# Patient Record
Sex: FEMALE | Race: BLACK OR AFRICAN AMERICAN | NOT HISPANIC OR LATINO | URBAN - METROPOLITAN AREA
[De-identification: names, ages, dates, MRNs, and addresses within clinical notes are randomized per-mention and may not be internally consistent; named-entity substitution may affect disease eponyms.]

---

## 2018-10-02 ENCOUNTER — OUTPATIENT (OUTPATIENT)
Dept: OUTPATIENT SERVICES | Facility: HOSPITAL | Age: 83
LOS: 1 days | Discharge: HOME | End: 2018-10-02

## 2018-10-02 DIAGNOSIS — R10.819 ABDOMINAL TENDERNESS, UNSPECIFIED SITE: ICD-10-CM

## 2019-04-06 ENCOUNTER — OUTPATIENT (OUTPATIENT)
Dept: OUTPATIENT SERVICES | Facility: HOSPITAL | Age: 84
LOS: 1 days | Discharge: HOME | End: 2019-04-06

## 2019-04-06 DIAGNOSIS — R79.9 ABNORMAL FINDING OF BLOOD CHEMISTRY, UNSPECIFIED: ICD-10-CM

## 2019-04-06 DIAGNOSIS — D64.9 ANEMIA, UNSPECIFIED: ICD-10-CM

## 2019-04-09 ENCOUNTER — OUTPATIENT (OUTPATIENT)
Dept: OUTPATIENT SERVICES | Facility: HOSPITAL | Age: 84
LOS: 1 days | Discharge: HOME | End: 2019-04-09

## 2019-04-09 DIAGNOSIS — R79.9 ABNORMAL FINDING OF BLOOD CHEMISTRY, UNSPECIFIED: ICD-10-CM

## 2019-04-10 ENCOUNTER — OUTPATIENT (OUTPATIENT)
Dept: OUTPATIENT SERVICES | Facility: HOSPITAL | Age: 84
LOS: 1 days | Discharge: HOME | End: 2019-04-10

## 2019-04-10 DIAGNOSIS — E10.9 TYPE 1 DIABETES MELLITUS WITHOUT COMPLICATIONS: ICD-10-CM

## 2019-04-10 DIAGNOSIS — E11.9 TYPE 2 DIABETES MELLITUS WITHOUT COMPLICATIONS: ICD-10-CM

## 2019-04-17 ENCOUNTER — OUTPATIENT (OUTPATIENT)
Dept: OUTPATIENT SERVICES | Facility: HOSPITAL | Age: 84
LOS: 1 days | Discharge: HOME | End: 2019-04-17

## 2019-04-17 DIAGNOSIS — F06.31 MOOD DISORDER DUE TO KNOWN PHYSIOLOGICAL CONDITION WITH DEPRESSIVE FEATURES: ICD-10-CM

## 2019-04-25 ENCOUNTER — OUTPATIENT (OUTPATIENT)
Dept: OUTPATIENT SERVICES | Facility: HOSPITAL | Age: 84
LOS: 1 days | Discharge: HOME | End: 2019-04-25

## 2019-04-25 DIAGNOSIS — D64.9 ANEMIA, UNSPECIFIED: ICD-10-CM

## 2019-05-06 ENCOUNTER — OUTPATIENT (OUTPATIENT)
Dept: OUTPATIENT SERVICES | Facility: HOSPITAL | Age: 84
LOS: 1 days | Discharge: HOME | End: 2019-05-06

## 2019-05-06 DIAGNOSIS — E11.9 TYPE 2 DIABETES MELLITUS WITHOUT COMPLICATIONS: ICD-10-CM

## 2019-05-08 ENCOUNTER — OUTPATIENT (OUTPATIENT)
Dept: OUTPATIENT SERVICES | Facility: HOSPITAL | Age: 84
LOS: 1 days | Discharge: HOME | End: 2019-05-08

## 2019-05-08 DIAGNOSIS — E10.9 TYPE 1 DIABETES MELLITUS WITHOUT COMPLICATIONS: ICD-10-CM

## 2019-05-08 DIAGNOSIS — I10 ESSENTIAL (PRIMARY) HYPERTENSION: ICD-10-CM

## 2019-07-10 ENCOUNTER — OUTPATIENT (OUTPATIENT)
Dept: OUTPATIENT SERVICES | Facility: HOSPITAL | Age: 84
LOS: 1 days | Discharge: HOME | End: 2019-07-10

## 2019-07-10 DIAGNOSIS — E11.9 TYPE 2 DIABETES MELLITUS WITHOUT COMPLICATIONS: ICD-10-CM

## 2019-07-17 ENCOUNTER — OUTPATIENT (OUTPATIENT)
Dept: OUTPATIENT SERVICES | Facility: HOSPITAL | Age: 84
LOS: 1 days | Discharge: HOME | End: 2019-07-17

## 2019-07-17 DIAGNOSIS — F32.9 MAJOR DEPRESSIVE DISORDER, SINGLE EPISODE, UNSPECIFIED: ICD-10-CM

## 2019-08-14 ENCOUNTER — OUTPATIENT (OUTPATIENT)
Dept: OUTPATIENT SERVICES | Facility: HOSPITAL | Age: 84
LOS: 1 days | Discharge: HOME | End: 2019-08-14

## 2019-08-14 DIAGNOSIS — E10.9 TYPE 1 DIABETES MELLITUS WITHOUT COMPLICATIONS: ICD-10-CM

## 2019-10-03 ENCOUNTER — OUTPATIENT (OUTPATIENT)
Dept: OUTPATIENT SERVICES | Facility: HOSPITAL | Age: 84
LOS: 1 days | Discharge: HOME | End: 2019-10-03

## 2019-10-03 DIAGNOSIS — R05 COUGH: ICD-10-CM

## 2019-10-03 DIAGNOSIS — J02.9 ACUTE PHARYNGITIS, UNSPECIFIED: ICD-10-CM

## 2019-10-09 ENCOUNTER — OUTPATIENT (OUTPATIENT)
Dept: OUTPATIENT SERVICES | Facility: HOSPITAL | Age: 84
LOS: 1 days | Discharge: HOME | End: 2019-10-09

## 2019-10-10 DIAGNOSIS — E10.9 TYPE 1 DIABETES MELLITUS WITHOUT COMPLICATIONS: ICD-10-CM

## 2019-10-10 DIAGNOSIS — E08.40 DIABETES MELLITUS DUE TO UNDERLYING CONDITION WITH DIABETIC NEUROPATHY, UNSPECIFIED: ICD-10-CM

## 2019-11-12 ENCOUNTER — OUTPATIENT (OUTPATIENT)
Dept: OUTPATIENT SERVICES | Facility: HOSPITAL | Age: 84
LOS: 1 days | Discharge: HOME | End: 2019-11-12

## 2019-11-15 DIAGNOSIS — R80.9 PROTEINURIA, UNSPECIFIED: ICD-10-CM

## 2023-10-25 ENCOUNTER — INPATIENT (INPATIENT)
Facility: HOSPITAL | Age: 88
LOS: 10 days | Discharge: NURSING, REHAB OR LONG-TERM CARE | DRG: 377 | End: 2023-11-05
Attending: INTERNAL MEDICINE | Admitting: STUDENT IN AN ORGANIZED HEALTH CARE EDUCATION/TRAINING PROGRAM
Payer: MEDICARE

## 2023-10-25 ENCOUNTER — TRANSCRIPTION ENCOUNTER (OUTPATIENT)
Age: 88
End: 2023-10-25

## 2023-10-25 VITALS
HEART RATE: 128 BPM | OXYGEN SATURATION: 95 % | DIASTOLIC BLOOD PRESSURE: 40 MMHG | WEIGHT: 165.35 LBS | SYSTOLIC BLOOD PRESSURE: 82 MMHG | RESPIRATION RATE: 26 BRPM | TEMPERATURE: 99 F

## 2023-10-25 DIAGNOSIS — K92.2 GASTROINTESTINAL HEMORRHAGE, UNSPECIFIED: ICD-10-CM

## 2023-10-25 LAB
ALBUMIN SERPL ELPH-MCNC: 3.4 G/DL — LOW (ref 3.5–5.2)
ALBUMIN SERPL ELPH-MCNC: 3.4 G/DL — LOW (ref 3.5–5.2)
ALP SERPL-CCNC: 52 U/L — SIGNIFICANT CHANGE UP (ref 30–115)
ALP SERPL-CCNC: 52 U/L — SIGNIFICANT CHANGE UP (ref 30–115)
ALT FLD-CCNC: <5 U/L — SIGNIFICANT CHANGE UP (ref 0–41)
ALT FLD-CCNC: <5 U/L — SIGNIFICANT CHANGE UP (ref 0–41)
ANION GAP SERPL CALC-SCNC: 14 MMOL/L — SIGNIFICANT CHANGE UP (ref 7–14)
ANION GAP SERPL CALC-SCNC: 14 MMOL/L — SIGNIFICANT CHANGE UP (ref 7–14)
APPEARANCE UR: ABNORMAL
APPEARANCE UR: ABNORMAL
APTT BLD: 25.9 SEC — LOW (ref 27–39.2)
APTT BLD: 25.9 SEC — LOW (ref 27–39.2)
AST SERPL-CCNC: 13 U/L — SIGNIFICANT CHANGE UP (ref 0–41)
AST SERPL-CCNC: 13 U/L — SIGNIFICANT CHANGE UP (ref 0–41)
BASE EXCESS BLDA CALC-SCNC: -5.1 MMOL/L — LOW (ref -2–3)
BASE EXCESS BLDA CALC-SCNC: -5.1 MMOL/L — LOW (ref -2–3)
BASOPHILS # BLD AUTO: 0.04 K/UL — SIGNIFICANT CHANGE UP (ref 0–0.2)
BASOPHILS # BLD AUTO: 0.04 K/UL — SIGNIFICANT CHANGE UP (ref 0–0.2)
BASOPHILS NFR BLD AUTO: 0.3 % — SIGNIFICANT CHANGE UP (ref 0–1)
BASOPHILS NFR BLD AUTO: 0.3 % — SIGNIFICANT CHANGE UP (ref 0–1)
BILIRUB SERPL-MCNC: 0.3 MG/DL — SIGNIFICANT CHANGE UP (ref 0.2–1.2)
BILIRUB SERPL-MCNC: 0.3 MG/DL — SIGNIFICANT CHANGE UP (ref 0.2–1.2)
BILIRUB UR-MCNC: NEGATIVE — SIGNIFICANT CHANGE UP
BILIRUB UR-MCNC: NEGATIVE — SIGNIFICANT CHANGE UP
BLD GP AB SCN SERPL QL: SIGNIFICANT CHANGE UP
BUN SERPL-MCNC: 41 MG/DL — HIGH (ref 10–20)
BUN SERPL-MCNC: 41 MG/DL — HIGH (ref 10–20)
CALCIUM SERPL-MCNC: 8.7 MG/DL — SIGNIFICANT CHANGE UP (ref 8.4–10.5)
CALCIUM SERPL-MCNC: 8.7 MG/DL — SIGNIFICANT CHANGE UP (ref 8.4–10.5)
CHLORIDE SERPL-SCNC: 109 MMOL/L — SIGNIFICANT CHANGE UP (ref 98–110)
CHLORIDE SERPL-SCNC: 109 MMOL/L — SIGNIFICANT CHANGE UP (ref 98–110)
CO2 SERPL-SCNC: 22 MMOL/L — SIGNIFICANT CHANGE UP (ref 17–32)
CO2 SERPL-SCNC: 22 MMOL/L — SIGNIFICANT CHANGE UP (ref 17–32)
COLOR SPEC: YELLOW — SIGNIFICANT CHANGE UP
COLOR SPEC: YELLOW — SIGNIFICANT CHANGE UP
CREAT SERPL-MCNC: 0.9 MG/DL — SIGNIFICANT CHANGE UP (ref 0.7–1.5)
CREAT SERPL-MCNC: 0.9 MG/DL — SIGNIFICANT CHANGE UP (ref 0.7–1.5)
DIFF PNL FLD: NEGATIVE — SIGNIFICANT CHANGE UP
DIFF PNL FLD: NEGATIVE — SIGNIFICANT CHANGE UP
EGFR: 62 ML/MIN/1.73M2 — SIGNIFICANT CHANGE UP
EGFR: 62 ML/MIN/1.73M2 — SIGNIFICANT CHANGE UP
EOSINOPHIL # BLD AUTO: 0.01 K/UL — SIGNIFICANT CHANGE UP (ref 0–0.7)
EOSINOPHIL # BLD AUTO: 0.01 K/UL — SIGNIFICANT CHANGE UP (ref 0–0.7)
EOSINOPHIL NFR BLD AUTO: 0.1 % — SIGNIFICANT CHANGE UP (ref 0–8)
EOSINOPHIL NFR BLD AUTO: 0.1 % — SIGNIFICANT CHANGE UP (ref 0–8)
GAS PNL BLDA: SIGNIFICANT CHANGE UP
GAS PNL BLDA: SIGNIFICANT CHANGE UP
GLUCOSE BLDC GLUCOMTR-MCNC: 223 MG/DL — HIGH (ref 70–99)
GLUCOSE BLDC GLUCOMTR-MCNC: 223 MG/DL — HIGH (ref 70–99)
GLUCOSE BLDC GLUCOMTR-MCNC: 248 MG/DL — HIGH (ref 70–99)
GLUCOSE BLDC GLUCOMTR-MCNC: 248 MG/DL — HIGH (ref 70–99)
GLUCOSE SERPL-MCNC: 235 MG/DL — HIGH (ref 70–99)
GLUCOSE SERPL-MCNC: 235 MG/DL — HIGH (ref 70–99)
GLUCOSE UR QL: 100 MG/DL
GLUCOSE UR QL: 100 MG/DL
HCO3 BLDA-SCNC: 21 MMOL/L — SIGNIFICANT CHANGE UP (ref 21–28)
HCO3 BLDA-SCNC: 21 MMOL/L — SIGNIFICANT CHANGE UP (ref 21–28)
HCT VFR BLD CALC: 36 % — LOW (ref 37–47)
HCT VFR BLD CALC: 36 % — LOW (ref 37–47)
HCT VFR BLD CALC: 39.7 % — SIGNIFICANT CHANGE UP (ref 37–47)
HCT VFR BLD CALC: 39.7 % — SIGNIFICANT CHANGE UP (ref 37–47)
HGB BLD-MCNC: 11.1 G/DL — LOW (ref 12–16)
HGB BLD-MCNC: 11.1 G/DL — LOW (ref 12–16)
HGB BLD-MCNC: 12.1 G/DL — SIGNIFICANT CHANGE UP (ref 12–16)
HGB BLD-MCNC: 12.1 G/DL — SIGNIFICANT CHANGE UP (ref 12–16)
HOROWITZ INDEX BLDA+IHG-RTO: 50 — SIGNIFICANT CHANGE UP
HOROWITZ INDEX BLDA+IHG-RTO: 50 — SIGNIFICANT CHANGE UP
IMM GRANULOCYTES NFR BLD AUTO: 0.3 % — SIGNIFICANT CHANGE UP (ref 0.1–0.3)
IMM GRANULOCYTES NFR BLD AUTO: 0.3 % — SIGNIFICANT CHANGE UP (ref 0.1–0.3)
INR BLD: 1.2 RATIO — SIGNIFICANT CHANGE UP (ref 0.65–1.3)
INR BLD: 1.2 RATIO — SIGNIFICANT CHANGE UP (ref 0.65–1.3)
KETONES UR-MCNC: NEGATIVE MG/DL — SIGNIFICANT CHANGE UP
KETONES UR-MCNC: NEGATIVE MG/DL — SIGNIFICANT CHANGE UP
LEUKOCYTE ESTERASE UR-ACNC: ABNORMAL
LEUKOCYTE ESTERASE UR-ACNC: ABNORMAL
LYMPHOCYTES # BLD AUTO: 1.47 K/UL — SIGNIFICANT CHANGE UP (ref 1.2–3.4)
LYMPHOCYTES # BLD AUTO: 1.47 K/UL — SIGNIFICANT CHANGE UP (ref 1.2–3.4)
LYMPHOCYTES # BLD AUTO: 10.2 % — LOW (ref 20.5–51.1)
LYMPHOCYTES # BLD AUTO: 10.2 % — LOW (ref 20.5–51.1)
MCHC RBC-ENTMCNC: 27.6 PG — SIGNIFICANT CHANGE UP (ref 27–31)
MCHC RBC-ENTMCNC: 27.6 PG — SIGNIFICANT CHANGE UP (ref 27–31)
MCHC RBC-ENTMCNC: 28.1 PG — SIGNIFICANT CHANGE UP (ref 27–31)
MCHC RBC-ENTMCNC: 28.1 PG — SIGNIFICANT CHANGE UP (ref 27–31)
MCHC RBC-ENTMCNC: 30.5 G/DL — LOW (ref 32–37)
MCHC RBC-ENTMCNC: 30.5 G/DL — LOW (ref 32–37)
MCHC RBC-ENTMCNC: 30.8 G/DL — LOW (ref 32–37)
MCHC RBC-ENTMCNC: 30.8 G/DL — LOW (ref 32–37)
MCV RBC AUTO: 90.4 FL — SIGNIFICANT CHANGE UP (ref 81–99)
MCV RBC AUTO: 90.4 FL — SIGNIFICANT CHANGE UP (ref 81–99)
MCV RBC AUTO: 91.1 FL — SIGNIFICANT CHANGE UP (ref 81–99)
MCV RBC AUTO: 91.1 FL — SIGNIFICANT CHANGE UP (ref 81–99)
MONOCYTES # BLD AUTO: 0.91 K/UL — HIGH (ref 0.1–0.6)
MONOCYTES # BLD AUTO: 0.91 K/UL — HIGH (ref 0.1–0.6)
MONOCYTES NFR BLD AUTO: 6.3 % — SIGNIFICANT CHANGE UP (ref 1.7–9.3)
MONOCYTES NFR BLD AUTO: 6.3 % — SIGNIFICANT CHANGE UP (ref 1.7–9.3)
NEUTROPHILS # BLD AUTO: 11.96 K/UL — HIGH (ref 1.4–6.5)
NEUTROPHILS # BLD AUTO: 11.96 K/UL — HIGH (ref 1.4–6.5)
NEUTROPHILS NFR BLD AUTO: 82.8 % — HIGH (ref 42.2–75.2)
NEUTROPHILS NFR BLD AUTO: 82.8 % — HIGH (ref 42.2–75.2)
NITRITE UR-MCNC: NEGATIVE — SIGNIFICANT CHANGE UP
NITRITE UR-MCNC: NEGATIVE — SIGNIFICANT CHANGE UP
NRBC # BLD: 0 /100 WBCS — SIGNIFICANT CHANGE UP (ref 0–0)
NT-PROBNP SERPL-SCNC: 334 PG/ML — HIGH (ref 0–300)
NT-PROBNP SERPL-SCNC: 334 PG/ML — HIGH (ref 0–300)
PCO2 BLDA: 43 MMHG — SIGNIFICANT CHANGE UP (ref 25–48)
PCO2 BLDA: 43 MMHG — SIGNIFICANT CHANGE UP (ref 25–48)
PH BLDA: 7.3 — LOW (ref 7.35–7.45)
PH BLDA: 7.3 — LOW (ref 7.35–7.45)
PH UR: 6 — SIGNIFICANT CHANGE UP (ref 5–8)
PH UR: 6 — SIGNIFICANT CHANGE UP (ref 5–8)
PLATELET # BLD AUTO: 253 K/UL — SIGNIFICANT CHANGE UP (ref 130–400)
PLATELET # BLD AUTO: 253 K/UL — SIGNIFICANT CHANGE UP (ref 130–400)
PLATELET # BLD AUTO: 273 K/UL — SIGNIFICANT CHANGE UP (ref 130–400)
PLATELET # BLD AUTO: 273 K/UL — SIGNIFICANT CHANGE UP (ref 130–400)
PMV BLD: 11.7 FL — HIGH (ref 7.4–10.4)
PMV BLD: 11.7 FL — HIGH (ref 7.4–10.4)
PMV BLD: 12.1 FL — HIGH (ref 7.4–10.4)
PMV BLD: 12.1 FL — HIGH (ref 7.4–10.4)
PO2 BLDA: 397 MMHG — HIGH (ref 83–108)
PO2 BLDA: 397 MMHG — HIGH (ref 83–108)
POTASSIUM SERPL-MCNC: 4.6 MMOL/L — SIGNIFICANT CHANGE UP (ref 3.5–5)
POTASSIUM SERPL-MCNC: 4.6 MMOL/L — SIGNIFICANT CHANGE UP (ref 3.5–5)
POTASSIUM SERPL-SCNC: 4.6 MMOL/L — SIGNIFICANT CHANGE UP (ref 3.5–5)
POTASSIUM SERPL-SCNC: 4.6 MMOL/L — SIGNIFICANT CHANGE UP (ref 3.5–5)
PROT SERPL-MCNC: 6.1 G/DL — SIGNIFICANT CHANGE UP (ref 6–8)
PROT SERPL-MCNC: 6.1 G/DL — SIGNIFICANT CHANGE UP (ref 6–8)
PROT UR-MCNC: 30 MG/DL
PROT UR-MCNC: 30 MG/DL
PROTHROM AB SERPL-ACNC: 13.8 SEC — HIGH (ref 9.95–12.87)
PROTHROM AB SERPL-ACNC: 13.8 SEC — HIGH (ref 9.95–12.87)
RBC # BLD: 3.95 M/UL — LOW (ref 4.2–5.4)
RBC # BLD: 3.95 M/UL — LOW (ref 4.2–5.4)
RBC # BLD: 4.39 M/UL — SIGNIFICANT CHANGE UP (ref 4.2–5.4)
RBC # BLD: 4.39 M/UL — SIGNIFICANT CHANGE UP (ref 4.2–5.4)
RBC # FLD: 15 % — HIGH (ref 11.5–14.5)
RBC # FLD: 15 % — HIGH (ref 11.5–14.5)
RBC # FLD: 15.1 % — HIGH (ref 11.5–14.5)
RBC # FLD: 15.1 % — HIGH (ref 11.5–14.5)
SAO2 % BLDA: 100 % — HIGH (ref 94–98)
SAO2 % BLDA: 100 % — HIGH (ref 94–98)
SODIUM SERPL-SCNC: 145 MMOL/L — SIGNIFICANT CHANGE UP (ref 135–146)
SODIUM SERPL-SCNC: 145 MMOL/L — SIGNIFICANT CHANGE UP (ref 135–146)
SP GR SPEC: 1.02 — SIGNIFICANT CHANGE UP (ref 1–1.03)
SP GR SPEC: 1.02 — SIGNIFICANT CHANGE UP (ref 1–1.03)
TROPONIN T SERPL-MCNC: <0.01 NG/ML — SIGNIFICANT CHANGE UP
TROPONIN T SERPL-MCNC: <0.01 NG/ML — SIGNIFICANT CHANGE UP
UROBILINOGEN FLD QL: 1 MG/DL — SIGNIFICANT CHANGE UP (ref 0.2–1)
UROBILINOGEN FLD QL: 1 MG/DL — SIGNIFICANT CHANGE UP (ref 0.2–1)
WBC # BLD: 14.44 K/UL — HIGH (ref 4.8–10.8)
WBC # BLD: 14.44 K/UL — HIGH (ref 4.8–10.8)
WBC # BLD: 15.67 K/UL — HIGH (ref 4.8–10.8)
WBC # BLD: 15.67 K/UL — HIGH (ref 4.8–10.8)
WBC # FLD AUTO: 14.44 K/UL — HIGH (ref 4.8–10.8)
WBC # FLD AUTO: 14.44 K/UL — HIGH (ref 4.8–10.8)
WBC # FLD AUTO: 15.67 K/UL — HIGH (ref 4.8–10.8)
WBC # FLD AUTO: 15.67 K/UL — HIGH (ref 4.8–10.8)

## 2023-10-25 PROCEDURE — 87641 MR-STAPH DNA AMP PROBE: CPT

## 2023-10-25 PROCEDURE — 85027 COMPLETE CBC AUTOMATED: CPT

## 2023-10-25 PROCEDURE — 85025 COMPLETE CBC W/AUTO DIFF WBC: CPT

## 2023-10-25 PROCEDURE — 87086 URINE CULTURE/COLONY COUNT: CPT

## 2023-10-25 PROCEDURE — C1889: CPT

## 2023-10-25 PROCEDURE — 85045 AUTOMATED RETICULOCYTE COUNT: CPT

## 2023-10-25 PROCEDURE — 81001 URINALYSIS AUTO W/SCOPE: CPT

## 2023-10-25 PROCEDURE — 83735 ASSAY OF MAGNESIUM: CPT

## 2023-10-25 PROCEDURE — 99223 1ST HOSP IP/OBS HIGH 75: CPT | Mod: 25

## 2023-10-25 PROCEDURE — 71045 X-RAY EXAM CHEST 1 VIEW: CPT | Mod: 26,76

## 2023-10-25 PROCEDURE — 82330 ASSAY OF CALCIUM: CPT

## 2023-10-25 PROCEDURE — 80048 BASIC METABOLIC PNL TOTAL CA: CPT

## 2023-10-25 PROCEDURE — 43255 EGD CONTROL BLEEDING ANY: CPT

## 2023-10-25 PROCEDURE — 36556 INSERT NON-TUNNEL CV CATH: CPT | Mod: LT

## 2023-10-25 PROCEDURE — 86923 COMPATIBILITY TEST ELECTRIC: CPT

## 2023-10-25 PROCEDURE — 92610 EVALUATE SWALLOWING FUNCTION: CPT | Mod: GN

## 2023-10-25 PROCEDURE — 84484 ASSAY OF TROPONIN QUANT: CPT

## 2023-10-25 PROCEDURE — 86900 BLOOD TYPING SEROLOGIC ABO: CPT

## 2023-10-25 PROCEDURE — 85014 HEMATOCRIT: CPT

## 2023-10-25 PROCEDURE — 83605 ASSAY OF LACTIC ACID: CPT

## 2023-10-25 PROCEDURE — 82962 GLUCOSE BLOOD TEST: CPT

## 2023-10-25 PROCEDURE — 93971 EXTREMITY STUDY: CPT | Mod: RT

## 2023-10-25 PROCEDURE — P9016: CPT

## 2023-10-25 PROCEDURE — 85730 THROMBOPLASTIN TIME PARTIAL: CPT

## 2023-10-25 PROCEDURE — 94003 VENT MGMT INPAT SUBQ DAY: CPT

## 2023-10-25 PROCEDURE — 36430 TRANSFUSION BLD/BLD COMPNT: CPT

## 2023-10-25 PROCEDURE — 97162 PT EVAL MOD COMPLEX 30 MIN: CPT | Mod: GP

## 2023-10-25 PROCEDURE — 94660 CPAP INITIATION&MGMT: CPT

## 2023-10-25 PROCEDURE — 87077 CULTURE AEROBIC IDENTIFY: CPT

## 2023-10-25 PROCEDURE — 99291 CRITICAL CARE FIRST HOUR: CPT | Mod: 25

## 2023-10-25 PROCEDURE — 85610 PROTHROMBIN TIME: CPT

## 2023-10-25 PROCEDURE — 84132 ASSAY OF SERUM POTASSIUM: CPT

## 2023-10-25 PROCEDURE — 31500 INSERT EMERGENCY AIRWAY: CPT

## 2023-10-25 PROCEDURE — 93005 ELECTROCARDIOGRAM TRACING: CPT

## 2023-10-25 PROCEDURE — 87040 BLOOD CULTURE FOR BACTERIA: CPT

## 2023-10-25 PROCEDURE — 93306 TTE W/DOPPLER COMPLETE: CPT

## 2023-10-25 PROCEDURE — 71045 X-RAY EXAM CHEST 1 VIEW: CPT

## 2023-10-25 PROCEDURE — 86850 RBC ANTIBODY SCREEN: CPT

## 2023-10-25 PROCEDURE — 85018 HEMOGLOBIN: CPT

## 2023-10-25 PROCEDURE — 80053 COMPREHEN METABOLIC PANEL: CPT

## 2023-10-25 PROCEDURE — 84295 ASSAY OF SERUM SODIUM: CPT

## 2023-10-25 PROCEDURE — 84100 ASSAY OF PHOSPHORUS: CPT

## 2023-10-25 PROCEDURE — 82803 BLOOD GASES ANY COMBINATION: CPT

## 2023-10-25 PROCEDURE — 87640 STAPH A DNA AMP PROBE: CPT

## 2023-10-25 PROCEDURE — 86901 BLOOD TYPING SEROLOGIC RH(D): CPT

## 2023-10-25 PROCEDURE — 93970 EXTREMITY STUDY: CPT

## 2023-10-25 PROCEDURE — 94002 VENT MGMT INPAT INIT DAY: CPT

## 2023-10-25 PROCEDURE — 36415 COLL VENOUS BLD VENIPUNCTURE: CPT

## 2023-10-25 PROCEDURE — 74018 RADEX ABDOMEN 1 VIEW: CPT

## 2023-10-25 PROCEDURE — C9113: CPT

## 2023-10-25 PROCEDURE — 92526 ORAL FUNCTION THERAPY: CPT | Mod: GN

## 2023-10-25 PROCEDURE — 99291 CRITICAL CARE FIRST HOUR: CPT | Mod: GC

## 2023-10-25 PROCEDURE — 83036 HEMOGLOBIN GLYCOSYLATED A1C: CPT

## 2023-10-25 PROCEDURE — 87186 SC STD MICRODIL/AGAR DIL: CPT

## 2023-10-25 RX ORDER — DEXTROSE 50 % IN WATER 50 %
15 SYRINGE (ML) INTRAVENOUS ONCE
Refills: 0 | Status: DISCONTINUED | OUTPATIENT
Start: 2023-10-25 | End: 2023-10-30

## 2023-10-25 RX ORDER — CEFTRIAXONE 500 MG/1
2000 INJECTION, POWDER, FOR SOLUTION INTRAMUSCULAR; INTRAVENOUS ONCE
Refills: 0 | Status: COMPLETED | OUTPATIENT
Start: 2023-10-25 | End: 2023-10-25

## 2023-10-25 RX ORDER — FENTANYL CITRATE 50 UG/ML
0.5 INJECTION INTRAVENOUS
Qty: 2500 | Refills: 0 | Status: DISCONTINUED | OUTPATIENT
Start: 2023-10-25 | End: 2023-10-30

## 2023-10-25 RX ORDER — ETOMIDATE 2 MG/ML
20 INJECTION INTRAVENOUS ONCE
Refills: 0 | Status: COMPLETED | OUTPATIENT
Start: 2023-10-25 | End: 2023-10-25

## 2023-10-25 RX ORDER — METOCLOPRAMIDE HCL 10 MG
10 TABLET ORAL ONCE
Refills: 0 | Status: COMPLETED | OUTPATIENT
Start: 2023-10-25 | End: 2023-10-25

## 2023-10-25 RX ORDER — SODIUM CHLORIDE 9 MG/ML
1000 INJECTION, SOLUTION INTRAVENOUS
Refills: 0 | Status: DISCONTINUED | OUTPATIENT
Start: 2023-10-25 | End: 2023-10-26

## 2023-10-25 RX ORDER — DEXTROSE 50 % IN WATER 50 %
25 SYRINGE (ML) INTRAVENOUS ONCE
Refills: 0 | Status: DISCONTINUED | OUTPATIENT
Start: 2023-10-25 | End: 2023-10-30

## 2023-10-25 RX ORDER — PANTOPRAZOLE SODIUM 20 MG/1
80 TABLET, DELAYED RELEASE ORAL ONCE
Refills: 0 | Status: COMPLETED | OUTPATIENT
Start: 2023-10-25 | End: 2023-10-25

## 2023-10-25 RX ORDER — FENTANYL CITRATE 50 UG/ML
50 INJECTION INTRAVENOUS ONCE
Refills: 0 | Status: DISCONTINUED | OUTPATIENT
Start: 2023-10-25 | End: 2023-10-25

## 2023-10-25 RX ORDER — SODIUM CHLORIDE 9 MG/ML
1000 INJECTION, SOLUTION INTRAVENOUS ONCE
Refills: 0 | Status: COMPLETED | OUTPATIENT
Start: 2023-10-25 | End: 2023-10-25

## 2023-10-25 RX ORDER — ROCURONIUM BROMIDE 10 MG/ML
10 VIAL (ML) INTRAVENOUS ONCE
Refills: 0 | Status: COMPLETED | OUTPATIENT
Start: 2023-10-25 | End: 2023-10-25

## 2023-10-25 RX ORDER — PIPERACILLIN AND TAZOBACTAM 4; .5 G/20ML; G/20ML
3.38 INJECTION, POWDER, LYOPHILIZED, FOR SOLUTION INTRAVENOUS EVERY 8 HOURS
Refills: 0 | Status: DISCONTINUED | OUTPATIENT
Start: 2023-10-25 | End: 2023-10-30

## 2023-10-25 RX ORDER — NOREPINEPHRINE BITARTRATE/D5W 8 MG/250ML
0.05 PLASTIC BAG, INJECTION (ML) INTRAVENOUS
Qty: 16 | Refills: 0 | Status: DISCONTINUED | OUTPATIENT
Start: 2023-10-25 | End: 2023-10-30

## 2023-10-25 RX ORDER — PROPOFOL 10 MG/ML
5 INJECTION, EMULSION INTRAVENOUS
Qty: 500 | Refills: 0 | Status: DISCONTINUED | OUTPATIENT
Start: 2023-10-25 | End: 2023-10-25

## 2023-10-25 RX ORDER — SENNA PLUS 8.6 MG/1
2 TABLET ORAL AT BEDTIME
Refills: 0 | Status: DISCONTINUED | OUTPATIENT
Start: 2023-10-25 | End: 2023-11-05

## 2023-10-25 RX ORDER — TRANEXAMIC ACID 100 MG/ML
1000 INJECTION, SOLUTION INTRAVENOUS ONCE
Refills: 0 | Status: COMPLETED | OUTPATIENT
Start: 2023-10-25 | End: 2023-10-25

## 2023-10-25 RX ORDER — SODIUM CHLORIDE 9 MG/ML
1000 INJECTION, SOLUTION INTRAVENOUS
Refills: 0 | Status: DISCONTINUED | OUTPATIENT
Start: 2023-10-25 | End: 2023-10-30

## 2023-10-25 RX ORDER — DEXTROSE 50 % IN WATER 50 %
12.5 SYRINGE (ML) INTRAVENOUS ONCE
Refills: 0 | Status: DISCONTINUED | OUTPATIENT
Start: 2023-10-25 | End: 2023-10-30

## 2023-10-25 RX ORDER — INSULIN LISPRO 100/ML
VIAL (ML) SUBCUTANEOUS
Refills: 0 | Status: DISCONTINUED | OUTPATIENT
Start: 2023-10-25 | End: 2023-11-05

## 2023-10-25 RX ORDER — PANTOPRAZOLE SODIUM 20 MG/1
8 TABLET, DELAYED RELEASE ORAL
Qty: 80 | Refills: 0 | Status: DISCONTINUED | OUTPATIENT
Start: 2023-10-25 | End: 2023-10-26

## 2023-10-25 RX ORDER — PROPOFOL 10 MG/ML
5 INJECTION, EMULSION INTRAVENOUS
Qty: 1000 | Refills: 0 | Status: DISCONTINUED | OUTPATIENT
Start: 2023-10-25 | End: 2023-10-26

## 2023-10-25 RX ORDER — PROPOFOL 10 MG/ML
50 INJECTION, EMULSION INTRAVENOUS ONCE
Refills: 0 | Status: COMPLETED | OUTPATIENT
Start: 2023-10-25 | End: 2023-10-25

## 2023-10-25 RX ORDER — GLUCAGON INJECTION, SOLUTION 0.5 MG/.1ML
1 INJECTION, SOLUTION SUBCUTANEOUS ONCE
Refills: 0 | Status: DISCONTINUED | OUTPATIENT
Start: 2023-10-25 | End: 2023-10-31

## 2023-10-25 RX ORDER — ERYTHROMYCIN ETHYLSUCCINATE 400 MG
250 TABLET ORAL ONCE
Refills: 0 | Status: DISCONTINUED | OUTPATIENT
Start: 2023-10-25 | End: 2023-10-25

## 2023-10-25 RX ORDER — ERYTHROMYCIN ETHYLSUCCINATE 400 MG
250 TABLET ORAL ONCE
Refills: 0 | Status: COMPLETED | OUTPATIENT
Start: 2023-10-25 | End: 2023-10-25

## 2023-10-25 RX ORDER — SODIUM CHLORIDE 9 MG/ML
1000 INJECTION INTRAMUSCULAR; INTRAVENOUS; SUBCUTANEOUS ONCE
Refills: 0 | Status: COMPLETED | OUTPATIENT
Start: 2023-10-25 | End: 2023-10-25

## 2023-10-25 RX ADMIN — TRANEXAMIC ACID 220 MILLIGRAM(S): 100 INJECTION, SOLUTION INTRAVENOUS at 13:23

## 2023-10-25 RX ADMIN — ETOMIDATE 20 MILLIGRAM(S): 2 INJECTION INTRAVENOUS at 12:30

## 2023-10-25 RX ADMIN — FENTANYL CITRATE 50 MICROGRAM(S): 50 INJECTION INTRAVENOUS at 16:54

## 2023-10-25 RX ADMIN — SODIUM CHLORIDE 1000 MILLILITER(S): 9 INJECTION INTRAMUSCULAR; INTRAVENOUS; SUBCUTANEOUS at 11:15

## 2023-10-25 RX ADMIN — PIPERACILLIN AND TAZOBACTAM 25 GRAM(S): 4; .5 INJECTION, POWDER, LYOPHILIZED, FOR SOLUTION INTRAVENOUS at 21:17

## 2023-10-25 RX ADMIN — PROPOFOL 2.25 MICROGRAM(S)/KG/MIN: 10 INJECTION, EMULSION INTRAVENOUS at 16:37

## 2023-10-25 RX ADMIN — PANTOPRAZOLE SODIUM 80 MILLIGRAM(S): 20 TABLET, DELAYED RELEASE ORAL at 13:18

## 2023-10-25 RX ADMIN — CEFTRIAXONE 100 MILLIGRAM(S): 500 INJECTION, POWDER, FOR SOLUTION INTRAMUSCULAR; INTRAVENOUS at 13:12

## 2023-10-25 RX ADMIN — Medication 10 MILLIGRAM(S): at 13:22

## 2023-10-25 RX ADMIN — PANTOPRAZOLE SODIUM 10 MG/HR: 20 TABLET, DELAYED RELEASE ORAL at 21:18

## 2023-10-25 RX ADMIN — PROPOFOL 2.13 MICROGRAM(S)/KG/MIN: 10 INJECTION, EMULSION INTRAVENOUS at 21:18

## 2023-10-25 RX ADMIN — FENTANYL CITRATE 50 MICROGRAM(S): 50 INJECTION INTRAVENOUS at 15:00

## 2023-10-25 RX ADMIN — SODIUM CHLORIDE 1000 MILLILITER(S): 9 INJECTION, SOLUTION INTRAVENOUS at 14:00

## 2023-10-25 RX ADMIN — Medication 250 MILLIGRAM(S): at 18:29

## 2023-10-25 RX ADMIN — Medication 10 MILLIGRAM(S): at 18:28

## 2023-10-25 RX ADMIN — PANTOPRAZOLE SODIUM 10 MG/HR: 20 TABLET, DELAYED RELEASE ORAL at 13:19

## 2023-10-25 RX ADMIN — Medication 2: at 21:49

## 2023-10-25 RX ADMIN — FENTANYL CITRATE 3.75 MICROGRAM(S)/KG/HR: 50 INJECTION INTRAVENOUS at 16:25

## 2023-10-25 RX ADMIN — Medication 3.52 MICROGRAM(S)/KG/MIN: at 21:18

## 2023-10-25 RX ADMIN — PROPOFOL 50 MILLIGRAM(S): 10 INJECTION, EMULSION INTRAVENOUS at 15:10

## 2023-10-25 NOTE — CONSULT NOTE ADULT - ASSESSMENT
Pt is a 87yo F with PMHx including Afib on Xarelto, CVA, HTN, DM a/w GIB s/p EGD; ENT consulted for epistaxis.     H/H: 11.1/36.0    ·	On exam, pt with ETT in place, vented. +dried blood noted to L naris. Oral exam limited 2/2 ETT however no BRB noted with suctioning of ETT  ·	Will hold off on packing placement for now as no signs of active epistaxis   ·	Trend h/h; transfuse prn   ·	s/w covering resident   ·	will d/w attng

## 2023-10-25 NOTE — CONSULT NOTE ADULT - SUBJECTIVE AND OBJECTIVE BOX
Patient is a 88y old  Female who presents with a chief complaint of     HPI:  88-year-old female with a past medical history significant for diabetes CVA atrial fibrillation hypertension on Xarelto who presents due to concern for upper GI bleed.  As per nursing home patient was having an episode of hematemesis patient in route was hemodynamically stable however when presented to the ED patient was noted to be hypotensive and tachycardic.  Patient had a witnessed episode of bright red blood vomiting.  At that point patient became more lethargic.  Patient's daughter at bedside had an extensive conversation with patient current CODE STATUS.  Patient and daughter both would like to be kept full code.  At that point made a decision with family aware to intubate patient due to concern for airway protection.  GI at bedside.  Patient given TXA ceftriaxone PPI bolus PPI drip.  Patient intubated and blood pressure improved with fluids.      PAST MEDICAL & SURGICAL HISTORY:      SOCIAL HX:   Smoking     UTO                    ETOH                            Other    FAMILY HISTORY:  :  No known cardiovacular family hisotry     Review Of Systems:     All ROS are negative except per HPI       Allergies    No Known Allergies    Intolerances          PHYSICAL EXAM    ICU Vital Signs Last 24 Hrs  T(C): 37.6 (25 Oct 2023 13:23), Max: 37.6 (25 Oct 2023 13:23)  T(F): 99.6 (25 Oct 2023 13:23), Max: 99.6 (25 Oct 2023 13:23)  HR: 140 (25 Oct 2023 13:23) (101 - 140)  BP: 159/93 (25 Oct 2023 13:23) (82/40 - 159/93)  BP(mean): --  ABP: --  ABP(mean): --  RR: 26 (25 Oct 2023 11:00) (26 - 26)  SpO2: 98% (25 Oct 2023 12:24) (95% - 98%)    O2 Parameters below as of 25 Oct 2023 11:00  Patient On (Oxygen Delivery Method): room air            CONSTITUTIONAL:  in NAD    ENT:   Airway patent,   Mouth with normal mucosa.     CARDIAC:   Tachycard  No edema    RESPIRATORY:   decreased bilateral BS   Not tachypneic,  No use of accessory muscles    GASTROINTESTINAL:  Abdomen soft,   tender to palpation,   No guarding,   + BS    NEUROLOGICAL:   sedated     SKIN:   Skin normal color for race,   No evidence of rash.          LABS:                          12.1   14.44 )-----------( 273      ( 25 Oct 2023 11:13 )             39.7                                               10-25    145  |  109  |  41<H>  ----------------------------<  235<H>  4.6   |  22  |  0.9    Ca    8.7      25 Oct 2023 11:13    TPro  6.1  /  Alb  3.4<L>  /  TBili  0.3  /  DBili  x   /  AST  13  /  ALT  <5  /  AlkPhos  52  10-25      PT/INR - ( 25 Oct 2023 11:13 )   PT: 13.80 sec;   INR: 1.20 ratio         PTT - ( 25 Oct 2023 11:13 )  PTT:25.9 sec                                       Urinalysis Basic - ( 25 Oct 2023 11:13 )    Color: x / Appearance: x / SG: x / pH: x  Gluc: 235 mg/dL / Ketone: x  / Bili: x / Urobili: x   Blood: x / Protein: x / Nitrite: x   Leuk Esterase: x / RBC: x / WBC x   Sq Epi: x / Non Sq Epi: x / Bacteria: x        CARDIAC MARKERS ( 25 Oct 2023 12:52 )  x     / <0.01 ng/mL / x     / x     / x                                                LIVER FUNCTIONS - ( 25 Oct 2023 11:13 )  Alb: 3.4 g/dL / Pro: 6.1 g/dL / ALK PHOS: 52 U/L / ALT: <5 U/L / AST: 13 U/L / GGT: x                                                                                               Mode: AC/ CMV (Assist Control/ Continuous Mandatory Ventilation)  RR (machine): 12  TV (machine): 400  FiO2: 100  PEEP: 8  MAP: 10  PIP: 24                                          X-Rays reviewed                                                                                     ECHO    MEDICATIONS  (STANDING):  cefTRIAXone   IVPB 2000 milliGRAM(s) IV Intermittent once  fentaNYL   Infusion. 0.5 MICROgram(s)/kG/Hr (3.75 mL/Hr) IV Continuous <Continuous>  pantoprazole Infusion 8 mG/Hr (10 mL/Hr) IV Continuous <Continuous>  propofol Infusion 5 MICROgram(s)/kG/Min (2.25 mL/Hr) IV Continuous <Continuous>    MEDICATIONS  (PRN):

## 2023-10-25 NOTE — ED PROVIDER NOTE - OBJECTIVE STATEMENT
88 year old female with pmhx of afib on xarelto, cva, htn, and dm brought in for vomiting bright red blood. pt sent in from NH after episode of epistaxis this morning, followed up vomiting red blood. pt denies cp, sob, cough, abd pain, fever, chills, or urinary symptoms.

## 2023-10-25 NOTE — ED ADULT NURSE NOTE - CHIEF COMPLAINT QUOTE
Pt presents to the ED from [facility] for nose bleed, vomiting blood and hypotension this morning after a nose bleed. Pt takes Eliquis.

## 2023-10-25 NOTE — ED PROVIDER NOTE - CLINICAL SUMMARY MEDICAL DECISION MAKING FREE TEXT BOX
88 yr old f that presents due to concern for UGIB, labs, imaging, gi aware. admit to icu. Labs and EKG were ordered and reviewed.  Imaging was ordered and reviewed by me.  Appropriate medications for patient's presenting complaints were ordered and effects were reassessed.  Patient's records (prior hospital, ED visit, and/or nursing home notes if available) were reviewed.  Additional history was obtained from EMS, family, and/or PCP (where available).  Escalation to admission/observation was considered.  Patient requires inpatient hospitalization - ICU

## 2023-10-25 NOTE — H&P ADULT - NSICDXPASTMEDICALHX_GEN_ALL_CORE_FT
PAST MEDICAL HISTORY:  Atrial fibrillation     Cerebrovascular accident (CVA)     Diabetes     Hypertension

## 2023-10-25 NOTE — CONSULT NOTE ADULT - ASSESSMENT
Assessment and Plan  Case of an 88 year old female patient known to have DM, HTN, DL, CVA, and atrial fibrillation on Xarelto who was brought to the ED on 10/25 for evaluation of episodes of hematemesis, found to be hypotensive and tachycardic with no drop in Hb, admitted for further management. Stay complicated by recurrence of hematemesis requiring intubation for airway protection. We are consulted for concern of upper GI bleeding.      Hematemesis Due to Suspected Upper GI Bleed   Likely Secondary to PUD VS Cinthya Fry tear VS AVM VS Esophageal Ulcer VS Erosive Hemorrhagic Gastritis VS Dieulafoy lesion Vs Malignancy  No Drop in Hemoglobin  * No history of alcohol use and no clinical evidence of cirrhosis: unlikely variceal bleeding  * Baseline hemoglobin (prior to admission): Hb 14.2 in 2022  * ED Vitals on arrival:  BP 86/60 mmHg,  pm -> received 500mL bolus by EMS, and 2 L boluses (2nd liter running now at 14:40PM)  * Intubated in ED following third episode of hematemesis for airway protection. Currently on levophed at 0.3 with low BP s/p contacted MICU team to administer more boluses at 14:30 PM with cental line placement for more levophed requirements  * Hemoglobin on admission: Hb 12.1 (asked MICU team to administer a STAT 1 unit of pRBC at 14:30 PM and to repeat CBC)  * Coags: INT 1.2 on 10/25) s/p Tranexamic acid. Last use of Xarelto (10/24)  * CIARRA not performed as patient was being intubated  * No current abdominal imaging  * No prior EGD  * Family History: negative for GI malignancies    RECOMMENDATIONS  - MICU evaluation appreciated  - Will schedule patient for EGD today once adequately resuscitated and hydrated. Consent was obtained from HCP daughter Ms Stroud after explaining the plan  - Please Keep NPO for now and continue IV fluid hydration  - Please give IV erythromycin 250mg 30 minutes before procedure (communicated with MICU team)  - Continue IV protonix drip (started at 11:09 AM)  - Monitor MAP and keep >65mmHg. Currently on levophed 0.3 and received total of 2.5L boluses with persistent hypotension at 14:30 PM. Contacted MICU team to administer another fluid bolus and consider central line placement for high pressor requirements  - Administer 1 unit pRBC STAR (communicated with MICU team at 14:30 PM) and repeat CBC. Trend CBC closely BID and transfuse as needed (target Hb >8). Maintain active Type and screen  - Trend BUN  - Please place x2 large 18G IV Bores  - Hold Xarelto if Ok with cardiology. Status post Tranexamic acid in ED on 10/25  - Monitor BM for melena or hematochezia   - Please avoid any NSAIDs  - NPO after midnight for procedure  - If any unstable bleed, please call GI stat      Thank you for your consult.  - Please note that plan was communicated with medical team.   - Please reach GI on [phone] during weekdays till 5pm.  - Please call the GI service line after 5pm on Weekdays and anytime on Weekends: [phone].      Felipe Segal MD  PGY - 4 Gastroenterology Fellow   [facility]     Assessment and Plan  Case of an 88 year old female patient known to have DM, HTN, DL, CVA, and atrial fibrillation on Xarelto who was brought to the ED on 10/25 for evaluation of episodes of hematemesis, found to be hypotensive and tachycardic with no drop in Hb, admitted for further management. Stay complicated by recurrence of hematemesis requiring intubation for airway protection. We are consulted for concern of upper GI bleeding.      Hematemesis Due to Suspected Upper GI Bleed   Likely Secondary to PUD VS Cinthya Fry tear VS AVM VS Esophageal Ulcer VS Erosive Hemorrhagic Gastritis VS Dieulafoy lesion Vs Malignancy  No Drop in Hemoglobin  * No history of alcohol use and no clinical evidence of cirrhosis: unlikely variceal bleeding  * Baseline hemoglobin (prior to admission): Hb 14.2 in 2022  * ED Vitals on arrival:  BP 86/60 mmHg,  pm -> received 500mL bolus by EMS, and 2 L boluses (2nd liter running now at 14:40PM)  * Intubated in ED following third episode of hematemesis for airway protection. Currently on levophed at 0.3 with low BP s/p contacted MICU team to administer more boluses at 14:30 PM with cental line placement for more levophed requirements  * Hemoglobin on admission: Hb 12.1 (asked MICU team to administer a STAT 1 unit of pRBC at 14:30 PM and to repeat CBC)  * Coags: INT 1.2 on 10/25) s/p Tranexamic acid. Last use of Xarelto (10/24)  * CIARRA not performed as patient was being intubated  * No current abdominal imaging  * No prior EGD  * Family History: negative for GI malignancies    RECOMMENDATIONS  - MICU evaluation appreciated  - Will schedule patient for EGD today once adequately resuscitated and hydrated. Consent was obtained from HCP daughter Ms Stroud after explaining the plan  - Please Keep NPO for now and continue IV fluid hydration  - Informed team to administer IV erythromycin 250mg now at 15:14PM and IV Reglan around 30 minutes before procedure (communicated with MICU team)  - Continue IV protonix drip (started at 11:09 AM)  - Monitor MAP and keep >65mmHg. Currently on levophed 0.3 and received total of 2.5L boluses with persistent hypotension at 14:30 PM. Contacted MICU team to administer another fluid bolus and consider central line placement for high pressor requirements  - Administer 1 unit pRBC STAR (communicated with MICU team at 14:30 PM) and repeat CBC. Trend CBC closely BID and transfuse as needed (target Hb >8). Maintain active Type and screen  - Trend BUN  - Please place x2 large 18G IV Bores  - Hold Xarelto if Ok with cardiology. Status post Tranexamic acid in ED on 10/25  - Monitor BM for melena or hematochezia   - Please avoid any NSAIDs  - NPO after midnight for procedure  - If any unstable bleed, please call GI stat      Thank you for your consult.  - Please note that plan was communicated with medical team.   - Please reach GI on [phone] during weekdays till 5pm.  - Please call the GI service line after 5pm on Weekdays and anytime on Weekends: [phone].      Felipe Segal MD  PGY - 4 Gastroenterology Fellow   [facility]     Assessment and Plan  Case of an 88 year old female patient known to have DM, HTN, DL, CVA, and atrial fibrillation on Xarelto who was brought to the ED on 10/25 for evaluation of episodes of hematemesis, found to be hypotensive and tachycardic, admitted for further management. Stay complicated by recurrence of hematemesis requiring intubation for airway protection. We are consulted for concern of upper GI bleeding.      Hematemesis Due to Suspected Upper GI Bleed   Likely Secondary to PUD VS Cinthya Fry tear VS AVM VS Esophageal Ulcer VS Erosive Hemorrhagic Gastritis VS Dieulafoy lesion Vs Malignancy vs. less likely variceal bleed  No Drop in Hemoglobin  * No history of alcohol use and no clinical evidence of cirrhosis: unlikely variceal bleeding  * Baseline hemoglobin (prior to admission): Hb 14.2 in 2022  * ED Vitals on arrival:  BP 86/60 mmHg,  pm -> received 500mL bolus by EMS, and 2 L boluses (2nd liter running now at 14:40PM)  * Intubated in ED following third episode of hematemesis for airway protection. Currently on levophed at 0.3 with low BP s/p contacted MICU team to administer more boluses at 14:30 PM with cental line placement for more levophed requirements  * Hemoglobin on admission: Hb 12.1 (asked MICU team to administer a STAT 1 unit of pRBC at 14:30 PM and to repeat CBC)  * Coags: INT 1.2 on 10/25) s/p Tranexamic acid. Last use of Xarelto (10/24)  * CIARRA not performed as patient was being intubated  * No current abdominal imaging  * No prior EGD  * Family History: negative for GI malignancies    RECOMMENDATIONS  - MICU evaluation appreciated  - Will schedule patient for EGD today once adequately resuscitated and hydrated. Consent was obtained from HCP daughter Ms Stroud after explaining the plan  - Please Keep NPO for now and continue IV fluid hydration  - Informed team to administer IV erythromycin 250mg now at 15:14PM and IV Reglan around 30 minutes before procedure (communicated with MICU team)  - Continue IV protonix drip (started at 11:09 AM)  - Monitor MAP and keep >65mmHg. Currently on levophed 0.3 and received total of 2.5L boluses with persistent hypotension at 14:30 PM. Contacted MICU team to administer another fluid bolus and consider central line placement for high pressor requirements  - Administer 1 unit pRBC STAT (communicated with MICU team at 14:30 PM) and repeat CBC. Trend CBC closely BID and transfuse as needed (target Hb >8). Maintain active Type and screen  - Trend BUN  - Please place x2 large 18G IV Bores  - Hold Xarelto if Ok with cardiology. Status post Tranexamic acid in ED on 10/25  - Monitor BM for melena or hematochezia   - Please avoid any NSAIDs  - NPO after midnight for procedure  - If any unstable bleed, please call GI stat      Thank you for your consult.  - Please note that plan was communicated with medical team.   - Please reach GI on [phone] during weekdays till 5pm.  - Please call the GI service line after 5pm on Weekdays and anytime on Weekends: [phone].      Felipe Segal MD  PGY - 4 Gastroenterology Fellow   [facility]

## 2023-10-25 NOTE — CONSULT NOTE ADULT - SUBJECTIVE AND OBJECTIVE BOX
Gastroenterology Initial Consult Note      Location: [facility] ED Hold 022 A ([facility] ED Hold)  Patient Name: JAMES WOODWARD  Age: 88y  Gender: Female      Chief Complaint  Patient is a 88y old Female who presents with a chief complaint of   Primary diagnosis of Gastrointestinal hemorrhage      Reason for Consult  Hematemesis      History of Present Illness  88 year old female patient known to have   - DM  - HTN  - DL and CVA  - Atrial fibrillation on Xarelto       She was brought to the ED on 10/25 for evaluation of episodes of hematemesis.  Per patient she had epigastric burning sensation this morning when she woke up.  This was associated with nausea and multiple episodes of vomiting.  Per daughter, patient vomited a cupful of bright red blood with clots (three times).  She had a brown BM this morning and 2 yesterday.  Per daughter, patient's weight has been stable, and she has not complained of dysphagia, odynophagia, or regurgitation.  ROS positive for chronic acid reflux.  No history of alcohol use or cirrhosis.    Upon arrival to ED, patient was found to be hypotensive and tachycardic.  * ED Vitals on arrival:  BP 86/60 mmHg,  pm -> received 500mL bolus by EMS, and 1 L bolus  * I was present at bedside at around 11:10 AM (after receiving consult at 11:05 AM).  * Patient's BP was initially 116/68 mmHg but then dropped to 90/67 and 80/50 mmHg at around 11:20 AM so I asked team to administer another 1L bolus at around 11:20 AM  * STAT VBG showed LA 4.9, Hb 11.3  * Hb 12.1 (asked MICU team to administer a STAT 1 unit of pRBC at 14:30 PM and to repeat CBC); Plt 273, WBC 14.44  * Coags: INT 1.2 on 10/25) s/p Tranexamic acid. Last use of Xarelto (10/24)  * Was intubated in ED for airway protection at around 14:05 PM. Currently on levophed at 0.3 with low BP s/p contacted MICU team to administer more boluses at 14:30 PM with cental line placement for more levophed requirements  * CIARRA not performed as patient was being intubated  * No current abdominal imaging  * No prior EGD  * Family History: negative for GI malignancies    We are consulted for concern of upper GI bleeding.  Upon bedside re-evaluation with attending at around 14:25 PM, patient was noted to be on levo0.3 and was receiving the 2nd bolus of fluids.  Team informed to administer STAT 1 unit pRBC, another bolus, and consider central line for increased pressor requirement.  Plan for EGD was explained to and consent was obtained from daughter.      Prior EGD:  No prior EGD per daughter      Prior Colonoscopy:  Last one was 6 years ago at [facility]  Unremarkable per daughter      Past Medical and Surgical History:  as per       Home Medications:  Home Medications:  as per med rec      Social History:  Tobacco: denies   Alcohol: denies   Drugs: denies      Allergies:  No Known Allergies      Family History:  FAMILY HISTORY:  no GI malignancies      Vital Signs in the last 24 hours   Vitals Summary T(C): 37.6 (10-25-23 @ 13:23), Max: 37.6 (10-25-23 @ 13:23)  HR: 140 (10-25-23 @ 13:23) (101 - 140)  BP: 159/93 (10-25-23 @ 13:23) (82/40 - 159/93)  RR: 26 (10-25-23 @ 11:00) (26 - 26)  SpO2: 98% (10-25-23 @ 12:24) (95% - 98%)  Vent Data Device: Avea, Mode: AC/ CMV (Assist Control/ Continuous Mandatory Ventilation), RR (machine): 12, TV (machine): 400, FiO2: 100, PEEP: 8, MAP: 10, PIP: 24  Intake/ Output   Measurements   Weight (kg): 75 (10-25-23 @ 11:00)      Physical Exam  * General Appearance: Alert prior to intubation, not cooperative, not very interactive, oriented to person but not to time or place  * Eyes: PERRL, conjunctiva/corneas clear, EOM's intact, fundi benign, both eyes   * Lungs: Good bilateral air entry, normal breath sounds   * Heart: Regular Rate and Rhythm, normal S1 and S2, no audible murmur, rub, or gallop  * Abdomen: Symmetric, non-distended, soft, non-tender, bowel sounds active all four quadrants, no masses  * Rectal: Brown stool, Normal tone, no palpable masses or tenderness      Investigations   Laboratory Workup      - CBC:                        12.1   14.44 )-----------( 273      ( 25 Oct 2023 11:13 )             39.7       - Hgb Trend:  12.1  10-25-23 @ 11:13        - Chemistry:  10-25    145  |  109  |  41<H>  ----------------------------<  235<H>  4.6   |  22  |  0.9    Ca    8.7      25 Oct 2023 11:13    TPro  6.1  /  Alb  3.4<L>  /  TBili  0.3  /  DBili  x   /  AST  13  /  ALT  <5  /  AlkPhos  52  10-25    Liver panel trend:  TBili 0.3   /   AST 13   /   ALT <5   /   AlkP 52   /   Tptn 6.1   /   Alb 3.4    /   DBili --      10-25      - Coagulation Studies:  PT/INR - ( 25 Oct 2023 11:13 )   PT: 13.80 sec;   INR: 1.20 ratio    PTT - ( 25 Oct 2023 11:13 )  PTT:25.9 sec      - Cardiac Markers:  CARDIAC MARKERS ( 25 Oct 2023 12:52 )  x     / <0.01 ng/mL / x     / x     / x            Microbiological Workup  Urinalysis Basic - ( 25 Oct 2023 11:13 )    Color: x / Appearance: x / SG: x / pH: x  Gluc: 235 mg/dL / Ketone: x  / Bili: x / Urobili: x   Blood: x / Protein: x / Nitrite: x   Leuk Esterase: x / RBC: x / WBC x   Sq Epi: x / Non Sq Epi: x / Bacteria: x        Current Medications  Standing Medications  fentaNYL   Infusion. 0.5 MICROgram(s)/kG/Hr (3.75 mL/Hr) IV Continuous <Continuous>  lactated ringers Bolus 1000 milliLiter(s) IV Bolus once  lactated ringers. 1000 milliLiter(s) (100 mL/Hr) IV Continuous <Continuous>  pantoprazole Infusion 8 mG/Hr (10 mL/Hr) IV Continuous <Continuous>  propofol Infusion 5 MICROgram(s)/kG/Min (2.25 mL/Hr) IV Continuous <Continuous>    PRN Medications    Singles Doses Administered  (ADM OVERRIDE) 1 each &lt;see task&gt; GiveOnce  (ADM OVERRIDE) 1 each &lt;see task&gt; GiveOnce  (ADM OVERRIDE) 1 each &lt;see task&gt; GiveOnce  (ADM OVERRIDE) 1 each &lt;see task&gt; GiveOnce  (ADM OVERRIDE) 1 each &lt;see task&gt; GiveOnce  (ADM OVERRIDE) 2 each &lt;see task&gt; GiveOnce  (ADM OVERRIDE) 1 each &lt;see task&gt; GiveOnce  (ADM OVERRIDE) 1 each &lt;see task&gt; GiveOnce  cefTRIAXone   IVPB 2000 milliGRAM(s) IV Intermittent once  etomidate Injectable 20 milliGRAM(s) IV Push once  pantoprazole  Injectable 80 milliGRAM(s) IV Push Once  rocuronium Injectable 10 milliGRAM(s) IV Push Once  sodium chloride 0.9% Bolus 1000 milliLiter(s) IV Bolus once  tranexamic acid IVPB 1000 milliGRAM(s) IV Intermittent Once

## 2023-10-25 NOTE — ED ADULT NURSE NOTE - OBJECTIVE STATEMENT
Pt presents to the ED from [facility]  for abdominal pain, vomiting blood and hypotension since this morning. Pt altered upon assessment. Pts daughter reports AC use.

## 2023-10-25 NOTE — ED PROVIDER NOTE - PHYSICAL EXAMINATION
CONST: Drowsy, lethargic  EYES: PERRL, EOMI, Sclera and conjunctiva clear.   ENT: No nasal discharge. Oropharynx normal appearing  NECK: Non-tender, no meningeal signs. normal ROM. supple   CARD: Normal S1 S2; Normal rate and rhythm  RESP: Equal BS B/L, No wheezes, rhonchi or rales. No distress  GI: Soft, non-tender, non-distended. no cva tenderness. normal BS  MS: Normal ROM in all extremities. No midline spinal tenderness. pulses 2 +. no calf tenderness or swelling  SKIN: Warm, dry, no acute rashes. Good turgor  NEURO: A&O, No focal deficits.

## 2023-10-25 NOTE — CONSULT NOTE ADULT - SUBJECTIVE AND OBJECTIVE BOX
ENT CONSULT:   Pt is a 89yo F with PMHx including Afib on Xarelto, CVA, HTN, DM a/w GIB; ENT consulted for epistaxis. Pt seen and examined at bedside. Pt intubated on vent, unable to obtain subjective information from patient-- case d/w covering resident. Pt initially presented to the ED today from NH for concern for GIB, had witnessed episode of vomiting bright red blood. Pt underwent EGD today with GI with finding of dieulafoy lesions s/p four endoclip placements.     PAST MEDICAL & SURGICAL HISTORY:  Diabetes    Hypertension    Atrial fibrillation    Cerebrovascular accident (CVA)      MEDICATIONS  (STANDING):  dextrose 5%. 1000 milliLiter(s) (100 mL/Hr) IV Continuous <Continuous>  dextrose 5%. 1000 milliLiter(s) (50 mL/Hr) IV Continuous <Continuous>  dextrose 50% Injectable 12.5 Gram(s) IV Push once  dextrose 50% Injectable 25 Gram(s) IV Push once  dextrose 50% Injectable 25 Gram(s) IV Push once  fentaNYL   Infusion. 0.5 MICROgram(s)/kG/Hr (3.75 mL/Hr) IV Continuous <Continuous>  glucagon  Injectable 1 milliGRAM(s) IntraMuscular once  insulin lispro (ADMELOG) corrective regimen sliding scale   SubCutaneous three times a day before meals  lactated ringers. 1000 milliLiter(s) (100 mL/Hr) IV Continuous <Continuous>  norepinephrine Infusion 0.05 MICROgram(s)/kG/Min (3.52 mL/Hr) IV Continuous <Continuous>  pantoprazole Infusion 8 mG/Hr (10 mL/Hr) IV Continuous <Continuous>  piperacillin/tazobactam IVPB.. 3.375 Gram(s) IV Intermittent every 8 hours  propofol Infusion 5 MICROgram(s)/kG/Min (2.13 mL/Hr) IV Continuous <Continuous>  senna 2 Tablet(s) Oral at bedtime    MEDICATIONS  (PRN):  dextrose Oral Gel 15 Gram(s) Oral once PRN Blood Glucose LESS THAN 70 milliGRAM(s)/deciliter      Allergies  No Known Allergies    SOCIAL HISTORY:    FAMILY HISTORY:      REVIEW OF SYSTEMS   [X] Due to altered mental status/intubation, subjective information were not able to be obtained from patient. History was obtained, to the extent possible, from review of the chart and collateral sources of information.    Vital Signs Last 24 Hrs  T(C): 37.6 (25 Oct 2023 21:00), Max: 37.6 (25 Oct 2023 13:23)  T(F): 99.6 (25 Oct 2023 21:00), Max: 99.6 (25 Oct 2023 13:23)  HR: 128 (25 Oct 2023 21:00) (91 - 140)  BP: 119/58 (25 Oct 2023 21:00) (67/32 - 159/93)  BP(mean): 83 (25 Oct 2023 21:00) (56 - 117)  RR: 20 (25 Oct 2023 21:00) (15 - 27)  SpO2: 99% (25 Oct 2023 21:00) (95% - 100%)    Parameters below as of 25 Oct 2023 21:00  Patient On (Oxygen Delivery Method): ventilator    O2 Concentration (%): 80    GEN: NAD.  +ETT in place, vented   SKIN: Good color, non diaphoretic.  HEENT: +ETT in place, vented. +dried blood noted to L naris. No active bleeding noted with suctioning.   RESP: +ETT in place, vented   ABDO: Soft, NT  EXT: ROWAN x 4      LABS:                        11.1   15.67 )-----------( 253      ( 25 Oct 2023 15:09 )             36.0     10-25    145  |  109  |  41<H>  ----------------------------<  235<H>  4.6   |  22  |  0.9    Ca    8.7      25 Oct 2023 11:13    TPro  6.1  /  Alb  3.4<L>  /  TBili  0.3  /  DBili  x   /  AST  13  /  ALT  <5  /  AlkPhos  52  10-25  PT/INR - ( 25 Oct 2023 11:13 )   PT: 13.80 sec;   INR: 1.20 ratio    PTT - ( 25 Oct 2023 11:13 )  PTT:25.9 sec

## 2023-10-25 NOTE — H&P ADULT - NSHPPHYSICALEXAM_GEN_ALL_CORE
LOS:     VITALS:   T(C): 37.6 (10-25-23 @ 13:23), Max: 37.6 (10-25-23 @ 13:23)  HR: 140 (10-25-23 @ 13:23) (101 - 140)  BP: 159/93 (10-25-23 @ 13:23) (82/40 - 159/93)  RR: 26 (10-25-23 @ 11:00) (26 - 26)  SpO2: 98% (10-25-23 @ 12:24) (95% - 98%)    GENERAL: intubated, sedated   EYES: EOMI, PERRLA, conjunctiva and sclera clear  ENT: Moist mucous membranes  NECK: Supple, No JVD  CHEST/LUNG: Clear to auscultation bilaterally;  HEART: tachycardic,  ABDOMEN: BSx4; Soft, nondistended, +BS   EXTREMITIES:  2+ Peripheral Pulses, no edema   SKIN: No rashes or lesions

## 2023-10-25 NOTE — H&P ADULT - HISTORY OF PRESENT ILLNESS
88-year-old female with a past medical history significant for diabetes, CVA, atrial fibrillation on Xarelto, HTN,  who presents due to concern for upper GI bleed.  As per nursing home patient was having an episode of hematemesis patient in route was hemodynamically stable however when presented to the ED patient was noted to be hypotensive and tachycardic.  Patient had a witnessed episode of bright red blood vomiting.  At that point patient became more lethargic.  Patient's daughter at bedside .  Patient and daughter both would like to be kept full code.  At that point made a decision with family aware to intubate patient due to concern for airway protection.  Patient given TXA ceftriaxone PPI bolus PPI drip in ED.  Patient intubated and blood pressure improved with fluids. GI following and plan to do EGD.     Vital Signs (24 Hrs):  T(C): 37.6 (10-25-23 @ 13:23), Max: 37.6 (10-25-23 @ 13:23)  HR: 140 (10-25-23 @ 13:23) (101 - 140)  BP: 159/93 (10-25-23 @ 13:23) (82/40 - 159/93)  RR: 26 (10-25-23 @ 11:00) (26 - 26)  SpO2: 98% (10-25-23 @ 12:24) (95% - 98%)       88-year-old female with a past medical history significant for diabetes, CVA, atrial fibrillation on Xarelto, HTN,  who presents due to concern for upper GI bleed. Per patient she had epigastric burning sensation this morning when she woke up. This was associated with nausea and multiple episodes of vomiting. Per daughter, patient vomited a cupful of bright red blood with clots (three times).  Patient in route was hemodynamically stable however when presented to the ED patient was noted to be hypotensive and tachycardic.  Patient had a witnessed episode of bright red blood vomiting.  At that point patient became more lethargic.  Patient's daughter at bedside .  Patient and daughter both would like to be kept full code.  At that point made a decision with family aware to intubate patient due to concern for airway protection.  Patient given TXA ceftriaxone PPI bolus PPI drip in ED.  Patient intubated. Received fluids and started on pressors. GI following and plan to do EGD.

## 2023-10-25 NOTE — ED PROVIDER NOTE - CRITICAL CARE ATTENDING CONTRIBUTION TO CARE
I personally saw the patient. MARIO Machado and I provided critical care for a total of 35 minutes. I provided a substantive portion of the care and the majority of the critical care time.  88-year-old female with a past medical history significant for diabetes CVA atrial fibrillation hypertension on Xarelto who presents due to concern for upper GI bleed.  As per nursing home patient was having an episode of hematemesis patient in route was hemodynamically stable however when presented to the ED patient was noted to be hypotensive and tachycardic.  Patient had a witnessed episode of bright red blood vomiting.  At that point patient became more lethargic.  Patient's daughter at bedside had an extensive conversation with patient current CODE STATUS.  Patient and daughter both would like to be kept full code.  At that point made a decision with family aware to intubate patient due to concern for airway protection.  GI at bedside.  Patient given TXA ceftriaxone PPI bolus PPI drip.  Patient intubated and blood pressure improved with fluids.    VITAL SIGNS: I have reviewed nursing notes and confirm.  CONSTITUTIONAL: letharigic  SKIN: no rash, no petechiae.  EYES: , EOMI, pink conjunctiva, anicteric  ENT: tongue midline, no exudates, MMM  NECK: Supple; no meningismus, no JVD  CARD: RRR, no murmurs, equal radial pulses bilaterally 2+  RESP: CTAB, no respiratory distress  ABD: Soft, non-tender, non-distended, no peritoneal signs, no HSM, no CVA tenderness  EXT: Normal ROM x4. No edema. No calves tenderness  NEURO: Alert, oriented x3. CN2-12 intact, equal strength bilaterally    88 yr old f that presents due to concern for UGIB, labs, imaging, gi aware. admit to icu.

## 2023-10-25 NOTE — CONSULT NOTE ADULT - ASSESSMENT
IMPRESSION:    Acute hypoxemic respiratory failure   UGIB    A. fib on xarelto   HO CVA     PLAN:    CNS: Sedation with fentanyl and precedex    HEENT: Oral care. ET care.     PULMONARY:  HOB @ 45 degrees. CXR noted. ETT in place. check ABG. Keep SpO2 >92%    CARDIOVASCULAR: Avoid overload. Check TTE. LR at 100 cc/hour. Repeat CE x1. Check lactate.     GI: PPI gtt. NPO for now. Planned for EGD with GI today.    RENAL:  Follow up lytes.  Correct as needed. Monitor I/Os.     INFECTIOUS DISEASE: Panculture. Zosyn for now. Check MRSA nares.     HEMATOLOGICAL:  Hold xarelto. Serial CBC. Monitor coags. LE duplex.     ENDOCRINE:  Follow up FS.  Insulin protocol if needed.    MUSCULOSKELETAL: Bed rest.     Full code - DW patient's daughter at bedside   MICU monitoring.          IMPRESSION:    Acute hypoxemic respiratory failure   UGIB    A. fib on xarelto   HO CVA     PLAN:    CNS: Sedation with fentanyl and precedex    HEENT: Oral care. ET care.     PULMONARY:  HOB @ 45 degrees. CXR noted. ETT in place. check ABG. Keep SpO2 >92%    CARDIOVASCULAR: Avoid overload. Check TTE. LR at 100 cc/hour. Repeat CE x1. Check lactate.     GI: PPI gtt. NPO for now. Planned for EGD with GI today. Give 1U PRBC now, 2U on hold for endo.     RENAL:  Follow up lytes.  Correct as needed. Monitor I/Os.     INFECTIOUS DISEASE: Panculture. Zosyn for now. Check MRSA nares.     HEMATOLOGICAL:  Hold xarelto. SP TXA. Serial CBC. Monitor coags. LE duplex.     ENDOCRINE:  Follow up FS.  Insulin protocol if needed.    MUSCULOSKELETAL: Bed rest.     Full code - DW patient's daughter at bedside   MICU monitoring.          IMPRESSION:    Acute hypoxemic respiratory failure   UGIB    A. fib on xarelto   HO CVA     PLAN:    CNS: Sedation with fentanyl and precedex    HEENT: Oral care. ET care.     PULMONARY:  HOB @ 45 degrees. CXR noted. ETT in place. check ABG. Keep SpO2 >92%    CARDIOVASCULAR: Avoid overload. Check TTE. LR at 100 cc/hour. Repeat CE x1. Check lactate.     GI: PPI gtt. NPO for now. Planned for EGD with GI today. Give 1U PRBC now, 2U on hold for endo. CT abdomen/pelvis angiogram when stable.    RENAL:  Follow up lytes.  Correct as needed. Monitor I/Os.     INFECTIOUS DISEASE: Panculture. Zosyn for now. Check MRSA nares.     HEMATOLOGICAL:  Hold xarelto. SP TXA. Serial CBC. Monitor coags. LE duplex.     ENDOCRINE:  Follow up FS.  Insulin protocol if needed.    MUSCULOSKELETAL: Bed rest.     Full code - DW patient's daughter at bedside   MICU monitoring.

## 2023-10-25 NOTE — PATIENT PROFILE ADULT - FALL HARM RISK - HARM RISK INTERVENTIONS

## 2023-10-25 NOTE — H&P ADULT - NSHPLABSRESULTS_GEN_ALL_CORE
LABS:  cret                        12.1   14.44 )-----------( 273      ( 25 Oct 2023 11:13 )             39.7     10-25    145  |  109  |  41<H>  ----------------------------<  235<H>  4.6   |  22  |  0.9    Ca    8.7      25 Oct 2023 11:13    TPro  6.1  /  Alb  3.4<L>  /  TBili  0.3  /  DBili  x   /  AST  13  /  ALT  <5  /  AlkPhos  52  10-25    PT/INR - ( 25 Oct 2023 11:13 )   PT: 13.80 sec;   INR: 1.20 ratio         PTT - ( 25 Oct 2023 11:13 )  PTT:25.9 sec

## 2023-10-25 NOTE — ED PROCEDURE NOTE - NS ED ATTENDING STATEMENT MOD
This was a shared visit with the MOJGAN. I reviewed and verified the documentation and independently performed the documented:
This was a shared visit with the MOJGAN. I reviewed and verified the documentation and independently performed the documented:

## 2023-10-25 NOTE — ED PROVIDER NOTE - PROGRESS NOTE DETAILS
ER: on imaging, concern for ET tube being moved further up. spoke to icu resident and respiratory therapist regarding need to move tube. both aware of pt.

## 2023-10-25 NOTE — CHART NOTE - NSCHARTNOTEFT_GEN_A_CORE
EGD findings:   Prior to EGD ET tube was readjusted by anesthesia.   Noted to have blood clot in the oral cavity above the ET tube which was suctioned.     Esophagus	Mucosa	Normal mucosa was noted in the whole esophagus. No evidence of active bleeding or ulcer was seen in the whole esophagus.  Stomach	Mucosa	Procedure was performed with GIF scope with the distal attachment. Large amount of blood clots were seen in the fundus. After suctioning blood clots noted to have three different areas of erythema concerning for dieulafoy lesions. Four endoclips were placed successfully to achieve hemostasis.  Duodenum	Additional duodenum findings	-Large amount of coffee ground secretions were seen in the duodenal bulb and second part of duodenum. After cleaning and examining under water carefully no evidence of active bleeding or ulcer was noted.      Plan:	  -NPO  -Monitor CBC q12, Keep Hb>8  -Active type and screen, 2 18 gauge  -C/w PPI drip   -If active bleeding with hemodynamic instability pls obtain CTA and inform GI STAT  -Montior in ICU   -ENT evaluation to r/o posterior nasal bleed

## 2023-10-26 LAB
A1C WITH ESTIMATED AVERAGE GLUCOSE RESULT: 6.4 % — HIGH (ref 4–5.6)
A1C WITH ESTIMATED AVERAGE GLUCOSE RESULT: 6.4 % — HIGH (ref 4–5.6)
ALBUMIN SERPL ELPH-MCNC: 3.1 G/DL — LOW (ref 3.5–5.2)
ALP SERPL-CCNC: 49 U/L — SIGNIFICANT CHANGE UP (ref 30–115)
ALP SERPL-CCNC: 49 U/L — SIGNIFICANT CHANGE UP (ref 30–115)
ALP SERPL-CCNC: 51 U/L — SIGNIFICANT CHANGE UP (ref 30–115)
ALP SERPL-CCNC: 51 U/L — SIGNIFICANT CHANGE UP (ref 30–115)
ALT FLD-CCNC: 14 U/L — SIGNIFICANT CHANGE UP (ref 0–41)
ALT FLD-CCNC: 14 U/L — SIGNIFICANT CHANGE UP (ref 0–41)
ALT FLD-CCNC: 15 U/L — SIGNIFICANT CHANGE UP (ref 0–41)
ALT FLD-CCNC: 15 U/L — SIGNIFICANT CHANGE UP (ref 0–41)
ANION GAP SERPL CALC-SCNC: 10 MMOL/L — SIGNIFICANT CHANGE UP (ref 7–14)
ANION GAP SERPL CALC-SCNC: 10 MMOL/L — SIGNIFICANT CHANGE UP (ref 7–14)
ANION GAP SERPL CALC-SCNC: 12 MMOL/L — SIGNIFICANT CHANGE UP (ref 7–14)
ANION GAP SERPL CALC-SCNC: 12 MMOL/L — SIGNIFICANT CHANGE UP (ref 7–14)
APTT BLD: 25.9 SEC — LOW (ref 27–39.2)
APTT BLD: 25.9 SEC — LOW (ref 27–39.2)
AST SERPL-CCNC: 24 U/L — SIGNIFICANT CHANGE UP (ref 0–41)
AST SERPL-CCNC: 24 U/L — SIGNIFICANT CHANGE UP (ref 0–41)
AST SERPL-CCNC: 25 U/L — SIGNIFICANT CHANGE UP (ref 0–41)
AST SERPL-CCNC: 25 U/L — SIGNIFICANT CHANGE UP (ref 0–41)
BACTERIA # UR AUTO: ABNORMAL /HPF
BACTERIA # UR AUTO: ABNORMAL /HPF
BASOPHILS # BLD AUTO: 0.04 K/UL — SIGNIFICANT CHANGE UP (ref 0–0.2)
BASOPHILS # BLD AUTO: 0.04 K/UL — SIGNIFICANT CHANGE UP (ref 0–0.2)
BASOPHILS # BLD AUTO: 0.05 K/UL — SIGNIFICANT CHANGE UP (ref 0–0.2)
BASOPHILS # BLD AUTO: 0.05 K/UL — SIGNIFICANT CHANGE UP (ref 0–0.2)
BASOPHILS # BLD AUTO: 0.06 K/UL — SIGNIFICANT CHANGE UP (ref 0–0.2)
BASOPHILS # BLD AUTO: 0.06 K/UL — SIGNIFICANT CHANGE UP (ref 0–0.2)
BASOPHILS NFR BLD AUTO: 0.2 % — SIGNIFICANT CHANGE UP (ref 0–1)
BASOPHILS NFR BLD AUTO: 0.2 % — SIGNIFICANT CHANGE UP (ref 0–1)
BASOPHILS NFR BLD AUTO: 0.3 % — SIGNIFICANT CHANGE UP (ref 0–1)
BILIRUB SERPL-MCNC: 0.4 MG/DL — SIGNIFICANT CHANGE UP (ref 0.2–1.2)
BILIRUB SERPL-MCNC: 0.4 MG/DL — SIGNIFICANT CHANGE UP (ref 0.2–1.2)
BILIRUB SERPL-MCNC: 0.5 MG/DL — SIGNIFICANT CHANGE UP (ref 0.2–1.2)
BILIRUB SERPL-MCNC: 0.5 MG/DL — SIGNIFICANT CHANGE UP (ref 0.2–1.2)
BUN SERPL-MCNC: 49 MG/DL — HIGH (ref 10–20)
BUN SERPL-MCNC: 49 MG/DL — HIGH (ref 10–20)
BUN SERPL-MCNC: 55 MG/DL — HIGH (ref 10–20)
BUN SERPL-MCNC: 55 MG/DL — HIGH (ref 10–20)
CALCIUM SERPL-MCNC: 7.8 MG/DL — LOW (ref 8.4–10.5)
CALCIUM SERPL-MCNC: 7.8 MG/DL — LOW (ref 8.4–10.5)
CALCIUM SERPL-MCNC: 8.1 MG/DL — LOW (ref 8.4–10.4)
CALCIUM SERPL-MCNC: 8.1 MG/DL — LOW (ref 8.4–10.4)
CAST: 15 /LPF — HIGH (ref 0–4)
CAST: 15 /LPF — HIGH (ref 0–4)
CHLORIDE SERPL-SCNC: 109 MMOL/L — SIGNIFICANT CHANGE UP (ref 98–110)
CHLORIDE SERPL-SCNC: 109 MMOL/L — SIGNIFICANT CHANGE UP (ref 98–110)
CHLORIDE SERPL-SCNC: 111 MMOL/L — HIGH (ref 98–110)
CHLORIDE SERPL-SCNC: 111 MMOL/L — HIGH (ref 98–110)
CO2 SERPL-SCNC: 22 MMOL/L — SIGNIFICANT CHANGE UP (ref 17–32)
CO2 SERPL-SCNC: 22 MMOL/L — SIGNIFICANT CHANGE UP (ref 17–32)
CO2 SERPL-SCNC: 23 MMOL/L — SIGNIFICANT CHANGE UP (ref 17–32)
CO2 SERPL-SCNC: 23 MMOL/L — SIGNIFICANT CHANGE UP (ref 17–32)
CREAT SERPL-MCNC: 0.9 MG/DL — SIGNIFICANT CHANGE UP (ref 0.7–1.5)
CREAT SERPL-MCNC: 0.9 MG/DL — SIGNIFICANT CHANGE UP (ref 0.7–1.5)
CREAT SERPL-MCNC: 1.1 MG/DL — SIGNIFICANT CHANGE UP (ref 0.7–1.5)
CREAT SERPL-MCNC: 1.1 MG/DL — SIGNIFICANT CHANGE UP (ref 0.7–1.5)
EGFR: 49 ML/MIN/1.73M2 — LOW
EGFR: 49 ML/MIN/1.73M2 — LOW
EGFR: 62 ML/MIN/1.73M2 — SIGNIFICANT CHANGE UP
EGFR: 62 ML/MIN/1.73M2 — SIGNIFICANT CHANGE UP
EOSINOPHIL # BLD AUTO: 0 K/UL — SIGNIFICANT CHANGE UP (ref 0–0.7)
EOSINOPHIL NFR BLD AUTO: 0 % — SIGNIFICANT CHANGE UP (ref 0–8)
ESTIMATED AVERAGE GLUCOSE: 137 MG/DL — HIGH (ref 68–114)
ESTIMATED AVERAGE GLUCOSE: 137 MG/DL — HIGH (ref 68–114)
FINE GRAN CASTS #/AREA URNS AUTO: PRESENT
FINE GRAN CASTS #/AREA URNS AUTO: PRESENT
GAS PNL BLDA: SIGNIFICANT CHANGE UP
GAS PNL BLDA: SIGNIFICANT CHANGE UP
GLUCOSE BLDC GLUCOMTR-MCNC: 179 MG/DL — HIGH (ref 70–99)
GLUCOSE BLDC GLUCOMTR-MCNC: 179 MG/DL — HIGH (ref 70–99)
GLUCOSE BLDC GLUCOMTR-MCNC: 193 MG/DL — HIGH (ref 70–99)
GLUCOSE BLDC GLUCOMTR-MCNC: 193 MG/DL — HIGH (ref 70–99)
GLUCOSE BLDC GLUCOMTR-MCNC: 204 MG/DL — HIGH (ref 70–99)
GLUCOSE BLDC GLUCOMTR-MCNC: 204 MG/DL — HIGH (ref 70–99)
GLUCOSE SERPL-MCNC: 223 MG/DL — HIGH (ref 70–99)
GLUCOSE SERPL-MCNC: 223 MG/DL — HIGH (ref 70–99)
GLUCOSE SERPL-MCNC: 239 MG/DL — HIGH (ref 70–99)
GLUCOSE SERPL-MCNC: 239 MG/DL — HIGH (ref 70–99)
HCT VFR BLD CALC: 34.1 % — LOW (ref 37–47)
HCT VFR BLD CALC: 34.1 % — LOW (ref 37–47)
HCT VFR BLD CALC: 36.6 % — LOW (ref 37–47)
HCT VFR BLD CALC: 36.6 % — LOW (ref 37–47)
HCT VFR BLD CALC: 41.3 % — SIGNIFICANT CHANGE UP (ref 37–47)
HCT VFR BLD CALC: 41.3 % — SIGNIFICANT CHANGE UP (ref 37–47)
HGB BLD-MCNC: 10.9 G/DL — LOW (ref 12–16)
HGB BLD-MCNC: 10.9 G/DL — LOW (ref 12–16)
HGB BLD-MCNC: 11.8 G/DL — LOW (ref 12–16)
HGB BLD-MCNC: 11.8 G/DL — LOW (ref 12–16)
HGB BLD-MCNC: 13 G/DL — SIGNIFICANT CHANGE UP (ref 12–16)
HGB BLD-MCNC: 13 G/DL — SIGNIFICANT CHANGE UP (ref 12–16)
IMM GRANULOCYTES NFR BLD AUTO: 0.5 % — HIGH (ref 0.1–0.3)
IMM GRANULOCYTES NFR BLD AUTO: 0.7 % — HIGH (ref 0.1–0.3)
IMM GRANULOCYTES NFR BLD AUTO: 0.7 % — HIGH (ref 0.1–0.3)
INR BLD: 1.2 RATIO — SIGNIFICANT CHANGE UP (ref 0.65–1.3)
INR BLD: 1.2 RATIO — SIGNIFICANT CHANGE UP (ref 0.65–1.3)
LYMPHOCYTES # BLD AUTO: 1.8 K/UL — SIGNIFICANT CHANGE UP (ref 1.2–3.4)
LYMPHOCYTES # BLD AUTO: 1.8 K/UL — SIGNIFICANT CHANGE UP (ref 1.2–3.4)
LYMPHOCYTES # BLD AUTO: 12.8 % — LOW (ref 20.5–51.1)
LYMPHOCYTES # BLD AUTO: 12.8 % — LOW (ref 20.5–51.1)
LYMPHOCYTES # BLD AUTO: 15 % — LOW (ref 20.5–51.1)
LYMPHOCYTES # BLD AUTO: 15 % — LOW (ref 20.5–51.1)
LYMPHOCYTES # BLD AUTO: 2.33 K/UL — SIGNIFICANT CHANGE UP (ref 1.2–3.4)
LYMPHOCYTES # BLD AUTO: 2.33 K/UL — SIGNIFICANT CHANGE UP (ref 1.2–3.4)
LYMPHOCYTES # BLD AUTO: 3.01 K/UL — SIGNIFICANT CHANGE UP (ref 1.2–3.4)
LYMPHOCYTES # BLD AUTO: 3.01 K/UL — SIGNIFICANT CHANGE UP (ref 1.2–3.4)
LYMPHOCYTES # BLD AUTO: 9.3 % — LOW (ref 20.5–51.1)
LYMPHOCYTES # BLD AUTO: 9.3 % — LOW (ref 20.5–51.1)
MCHC RBC-ENTMCNC: 27.8 PG — SIGNIFICANT CHANGE UP (ref 27–31)
MCHC RBC-ENTMCNC: 31.5 G/DL — LOW (ref 32–37)
MCHC RBC-ENTMCNC: 31.5 G/DL — LOW (ref 32–37)
MCHC RBC-ENTMCNC: 32 G/DL — SIGNIFICANT CHANGE UP (ref 32–37)
MCHC RBC-ENTMCNC: 32 G/DL — SIGNIFICANT CHANGE UP (ref 32–37)
MCHC RBC-ENTMCNC: 32.2 G/DL — SIGNIFICANT CHANGE UP (ref 32–37)
MCHC RBC-ENTMCNC: 32.2 G/DL — SIGNIFICANT CHANGE UP (ref 32–37)
MCV RBC AUTO: 86.3 FL — SIGNIFICANT CHANGE UP (ref 81–99)
MCV RBC AUTO: 86.3 FL — SIGNIFICANT CHANGE UP (ref 81–99)
MCV RBC AUTO: 87 FL — SIGNIFICANT CHANGE UP (ref 81–99)
MCV RBC AUTO: 87 FL — SIGNIFICANT CHANGE UP (ref 81–99)
MCV RBC AUTO: 88.2 FL — SIGNIFICANT CHANGE UP (ref 81–99)
MCV RBC AUTO: 88.2 FL — SIGNIFICANT CHANGE UP (ref 81–99)
MONOCYTES # BLD AUTO: 1.85 K/UL — HIGH (ref 0.1–0.6)
MONOCYTES # BLD AUTO: 1.85 K/UL — HIGH (ref 0.1–0.6)
MONOCYTES # BLD AUTO: 1.97 K/UL — HIGH (ref 0.1–0.6)
MONOCYTES # BLD AUTO: 1.97 K/UL — HIGH (ref 0.1–0.6)
MONOCYTES # BLD AUTO: 2.11 K/UL — HIGH (ref 0.1–0.6)
MONOCYTES # BLD AUTO: 2.11 K/UL — HIGH (ref 0.1–0.6)
MONOCYTES NFR BLD AUTO: 10.5 % — HIGH (ref 1.7–9.3)
MONOCYTES NFR BLD AUTO: 10.5 % — HIGH (ref 1.7–9.3)
MONOCYTES NFR BLD AUTO: 10.8 % — HIGH (ref 1.7–9.3)
MONOCYTES NFR BLD AUTO: 10.8 % — HIGH (ref 1.7–9.3)
MONOCYTES NFR BLD AUTO: 9.6 % — HIGH (ref 1.7–9.3)
MONOCYTES NFR BLD AUTO: 9.6 % — HIGH (ref 1.7–9.3)
MRSA PCR RESULT.: NEGATIVE — SIGNIFICANT CHANGE UP
MRSA PCR RESULT.: NEGATIVE — SIGNIFICANT CHANGE UP
NEUTROPHILS # BLD AUTO: 13.82 K/UL — HIGH (ref 1.4–6.5)
NEUTROPHILS # BLD AUTO: 13.82 K/UL — HIGH (ref 1.4–6.5)
NEUTROPHILS # BLD AUTO: 14.81 K/UL — HIGH (ref 1.4–6.5)
NEUTROPHILS # BLD AUTO: 14.81 K/UL — HIGH (ref 1.4–6.5)
NEUTROPHILS # BLD AUTO: 15.52 K/UL — HIGH (ref 1.4–6.5)
NEUTROPHILS # BLD AUTO: 15.52 K/UL — HIGH (ref 1.4–6.5)
NEUTROPHILS NFR BLD AUTO: 73.7 % — SIGNIFICANT CHANGE UP (ref 42.2–75.2)
NEUTROPHILS NFR BLD AUTO: 73.7 % — SIGNIFICANT CHANGE UP (ref 42.2–75.2)
NEUTROPHILS NFR BLD AUTO: 75.6 % — HIGH (ref 42.2–75.2)
NEUTROPHILS NFR BLD AUTO: 75.6 % — HIGH (ref 42.2–75.2)
NEUTROPHILS NFR BLD AUTO: 80.2 % — HIGH (ref 42.2–75.2)
NEUTROPHILS NFR BLD AUTO: 80.2 % — HIGH (ref 42.2–75.2)
NRBC # BLD: 0 /100 WBCS — SIGNIFICANT CHANGE UP (ref 0–0)
PLATELET # BLD AUTO: 182 K/UL — SIGNIFICANT CHANGE UP (ref 130–400)
PLATELET # BLD AUTO: 182 K/UL — SIGNIFICANT CHANGE UP (ref 130–400)
PLATELET # BLD AUTO: 229 K/UL — SIGNIFICANT CHANGE UP (ref 130–400)
PMV BLD: 11.4 FL — HIGH (ref 7.4–10.4)
PMV BLD: 11.4 FL — HIGH (ref 7.4–10.4)
PMV BLD: 11.5 FL — HIGH (ref 7.4–10.4)
PMV BLD: 11.5 FL — HIGH (ref 7.4–10.4)
PMV BLD: 11.8 FL — HIGH (ref 7.4–10.4)
PMV BLD: 11.8 FL — HIGH (ref 7.4–10.4)
POTASSIUM SERPL-MCNC: 4.2 MMOL/L — SIGNIFICANT CHANGE UP (ref 3.5–5)
POTASSIUM SERPL-MCNC: 4.2 MMOL/L — SIGNIFICANT CHANGE UP (ref 3.5–5)
POTASSIUM SERPL-MCNC: 4.8 MMOL/L — SIGNIFICANT CHANGE UP (ref 3.5–5)
POTASSIUM SERPL-MCNC: 4.8 MMOL/L — SIGNIFICANT CHANGE UP (ref 3.5–5)
POTASSIUM SERPL-SCNC: 4.2 MMOL/L — SIGNIFICANT CHANGE UP (ref 3.5–5)
POTASSIUM SERPL-SCNC: 4.2 MMOL/L — SIGNIFICANT CHANGE UP (ref 3.5–5)
POTASSIUM SERPL-SCNC: 4.8 MMOL/L — SIGNIFICANT CHANGE UP (ref 3.5–5)
POTASSIUM SERPL-SCNC: 4.8 MMOL/L — SIGNIFICANT CHANGE UP (ref 3.5–5)
PROT SERPL-MCNC: 5.4 G/DL — LOW (ref 6–8)
PROT SERPL-MCNC: 5.4 G/DL — LOW (ref 6–8)
PROT SERPL-MCNC: 5.9 G/DL — LOW (ref 6–8)
PROT SERPL-MCNC: 5.9 G/DL — LOW (ref 6–8)
PROTHROM AB SERPL-ACNC: 13.7 SEC — HIGH (ref 9.95–12.87)
PROTHROM AB SERPL-ACNC: 13.7 SEC — HIGH (ref 9.95–12.87)
RBC # BLD: 3.92 M/UL — LOW (ref 4.2–5.4)
RBC # BLD: 3.92 M/UL — LOW (ref 4.2–5.4)
RBC # BLD: 4.24 M/UL — SIGNIFICANT CHANGE UP (ref 4.2–5.4)
RBC # BLD: 4.24 M/UL — SIGNIFICANT CHANGE UP (ref 4.2–5.4)
RBC # BLD: 4.68 M/UL — SIGNIFICANT CHANGE UP (ref 4.2–5.4)
RBC # BLD: 4.68 M/UL — SIGNIFICANT CHANGE UP (ref 4.2–5.4)
RBC # FLD: 15.1 % — HIGH (ref 11.5–14.5)
RBC # FLD: 15.1 % — HIGH (ref 11.5–14.5)
RBC # FLD: 15.9 % — HIGH (ref 11.5–14.5)
RBC # FLD: 15.9 % — HIGH (ref 11.5–14.5)
RBC # FLD: 16.2 % — HIGH (ref 11.5–14.5)
RBC # FLD: 16.2 % — HIGH (ref 11.5–14.5)
RBC CASTS # UR COMP ASSIST: 4 /HPF — SIGNIFICANT CHANGE UP (ref 0–4)
RBC CASTS # UR COMP ASSIST: 4 /HPF — SIGNIFICANT CHANGE UP (ref 0–4)
SODIUM SERPL-SCNC: 142 MMOL/L — SIGNIFICANT CHANGE UP (ref 135–146)
SODIUM SERPL-SCNC: 142 MMOL/L — SIGNIFICANT CHANGE UP (ref 135–146)
SODIUM SERPL-SCNC: 145 MMOL/L — SIGNIFICANT CHANGE UP (ref 135–146)
SODIUM SERPL-SCNC: 145 MMOL/L — SIGNIFICANT CHANGE UP (ref 135–146)
SQUAMOUS # UR AUTO: 4 /HPF — SIGNIFICANT CHANGE UP (ref 0–5)
SQUAMOUS # UR AUTO: 4 /HPF — SIGNIFICANT CHANGE UP (ref 0–5)
TROPONIN T SERPL-MCNC: <0.01 NG/ML — SIGNIFICANT CHANGE UP
WBC # BLD: 18.26 K/UL — HIGH (ref 4.8–10.8)
WBC # BLD: 18.26 K/UL — HIGH (ref 4.8–10.8)
WBC # BLD: 19.34 K/UL — HIGH (ref 4.8–10.8)
WBC # BLD: 19.34 K/UL — HIGH (ref 4.8–10.8)
WBC # BLD: 20.09 K/UL — HIGH (ref 4.8–10.8)
WBC # BLD: 20.09 K/UL — HIGH (ref 4.8–10.8)
WBC # FLD AUTO: 18.26 K/UL — HIGH (ref 4.8–10.8)
WBC # FLD AUTO: 18.26 K/UL — HIGH (ref 4.8–10.8)
WBC # FLD AUTO: 19.34 K/UL — HIGH (ref 4.8–10.8)
WBC # FLD AUTO: 19.34 K/UL — HIGH (ref 4.8–10.8)
WBC # FLD AUTO: 20.09 K/UL — HIGH (ref 4.8–10.8)
WBC # FLD AUTO: 20.09 K/UL — HIGH (ref 4.8–10.8)
WBC UR QL: 53 /HPF — HIGH (ref 0–5)
WBC UR QL: 53 /HPF — HIGH (ref 0–5)

## 2023-10-26 PROCEDURE — 99222 1ST HOSP IP/OBS MODERATE 55: CPT

## 2023-10-26 PROCEDURE — 71045 X-RAY EXAM CHEST 1 VIEW: CPT | Mod: 26

## 2023-10-26 PROCEDURE — 99291 CRITICAL CARE FIRST HOUR: CPT

## 2023-10-26 PROCEDURE — 93970 EXTREMITY STUDY: CPT | Mod: 26

## 2023-10-26 PROCEDURE — 99233 SBSQ HOSP IP/OBS HIGH 50: CPT

## 2023-10-26 PROCEDURE — 93306 TTE W/DOPPLER COMPLETE: CPT | Mod: 26

## 2023-10-26 RX ORDER — CHLORHEXIDINE GLUCONATE 213 G/1000ML
15 SOLUTION TOPICAL
Refills: 0 | Status: DISCONTINUED | OUTPATIENT
Start: 2023-10-26 | End: 2023-10-28

## 2023-10-26 RX ORDER — CHLORHEXIDINE GLUCONATE 213 G/1000ML
15 SOLUTION TOPICAL EVERY 12 HOURS
Refills: 0 | Status: DISCONTINUED | OUTPATIENT
Start: 2023-10-26 | End: 2023-10-29

## 2023-10-26 RX ORDER — CHLORHEXIDINE GLUCONATE 213 G/1000ML
1 SOLUTION TOPICAL
Refills: 0 | Status: DISCONTINUED | OUTPATIENT
Start: 2023-10-26 | End: 2023-11-05

## 2023-10-26 RX ORDER — DEXMEDETOMIDINE HYDROCHLORIDE IN 0.9% SODIUM CHLORIDE 4 UG/ML
0.05 INJECTION INTRAVENOUS
Qty: 200 | Refills: 0 | Status: DISCONTINUED | OUTPATIENT
Start: 2023-10-26 | End: 2023-10-27

## 2023-10-26 RX ORDER — PANTOPRAZOLE SODIUM 20 MG/1
40 TABLET, DELAYED RELEASE ORAL EVERY 12 HOURS
Refills: 0 | Status: DISCONTINUED | OUTPATIENT
Start: 2023-10-26 | End: 2023-10-31

## 2023-10-26 RX ORDER — SODIUM CHLORIDE 9 MG/ML
1000 INJECTION, SOLUTION INTRAVENOUS
Refills: 0 | Status: DISCONTINUED | OUTPATIENT
Start: 2023-10-26 | End: 2023-10-31

## 2023-10-26 RX ADMIN — Medication 3.52 MICROGRAM(S)/KG/MIN: at 18:02

## 2023-10-26 RX ADMIN — CHLORHEXIDINE GLUCONATE 15 MILLILITER(S): 213 SOLUTION TOPICAL at 17:48

## 2023-10-26 RX ADMIN — PIPERACILLIN AND TAZOBACTAM 25 GRAM(S): 4; .5 INJECTION, POWDER, LYOPHILIZED, FOR SOLUTION INTRAVENOUS at 14:42

## 2023-10-26 RX ADMIN — DEXMEDETOMIDINE HYDROCHLORIDE IN 0.9% SODIUM CHLORIDE 0.89 MICROGRAM(S)/KG/HR: 4 INJECTION INTRAVENOUS at 18:02

## 2023-10-26 RX ADMIN — Medication 2: at 17:49

## 2023-10-26 RX ADMIN — PIPERACILLIN AND TAZOBACTAM 25 GRAM(S): 4; .5 INJECTION, POWDER, LYOPHILIZED, FOR SOLUTION INTRAVENOUS at 05:50

## 2023-10-26 RX ADMIN — Medication 3.52 MICROGRAM(S)/KG/MIN: at 04:20

## 2023-10-26 RX ADMIN — SODIUM CHLORIDE 75 MILLILITER(S): 9 INJECTION, SOLUTION INTRAVENOUS at 12:43

## 2023-10-26 RX ADMIN — DEXMEDETOMIDINE HYDROCHLORIDE IN 0.9% SODIUM CHLORIDE 0.89 MICROGRAM(S)/KG/HR: 4 INJECTION INTRAVENOUS at 14:42

## 2023-10-26 RX ADMIN — FENTANYL CITRATE 3.75 MICROGRAM(S)/KG/HR: 50 INJECTION INTRAVENOUS at 14:42

## 2023-10-26 RX ADMIN — PANTOPRAZOLE SODIUM 10 MG/HR: 20 TABLET, DELAYED RELEASE ORAL at 12:42

## 2023-10-26 RX ADMIN — PANTOPRAZOLE SODIUM 40 MILLIGRAM(S): 20 TABLET, DELAYED RELEASE ORAL at 17:47

## 2023-10-26 RX ADMIN — Medication 1: at 12:43

## 2023-10-26 RX ADMIN — PIPERACILLIN AND TAZOBACTAM 25 GRAM(S): 4; .5 INJECTION, POWDER, LYOPHILIZED, FOR SOLUTION INTRAVENOUS at 22:56

## 2023-10-26 RX ADMIN — SODIUM CHLORIDE 100 MILLILITER(S): 9 INJECTION, SOLUTION INTRAVENOUS at 04:20

## 2023-10-26 RX ADMIN — CHLORHEXIDINE GLUCONATE 1 APPLICATION(S): 213 SOLUTION TOPICAL at 06:44

## 2023-10-26 RX ADMIN — DEXMEDETOMIDINE HYDROCHLORIDE IN 0.9% SODIUM CHLORIDE 0.89 MICROGRAM(S)/KG/HR: 4 INJECTION INTRAVENOUS at 22:57

## 2023-10-26 NOTE — PROGRESS NOTE ADULT - ASSESSMENT
IMPRESSION:    Acute hypoxemic respiratory failure   UGIB    A. fib on xarelto   HO CVA     PLAN:    CNS: Sedation with fentanyl and precedex  do SAT for SBT   HEENT: Oral care. ET care.     PULMONARY:  HOB @ 45 degrees. CXR noted. ETT in place. check ABG. Keep SpO2 >92%    CARDIOVASCULAR: Avoid overload. Check TTE. LR at 70 cc/hour. Repeat CE x1. Check lactate.   CE   EKG Now     GI: PPI gtt. NPO for now. follow GI if no intervention proceed with SBT    RENAL:  Follow up lytes.  Correct as needed. Monitor I/Os.     INFECTIOUS DISEASE: Panculture. Zosyn for now. Check MRSA nares.     HEMATOLOGICAL:  Hold xarelto. SP TXA. Serial CBC. Monitor coags. LE duplex.     ENDOCRINE:  Follow up FS.  Insulin protocol if needed.    MUSCULOSKELETAL: Bed rest.     Full code -    MICU monitoring.          IMPRESSION:    Acute hypoxemic respiratory failure   UGIB    A. fib on xarelto   HO CVA     PLAN:    CNS: Sedation with fentanyl and precedex  do SAT for SBT   HEENT: Oral care. ET care.     PULMONARY:  HOB @ 45 degrees. CXR noted. ETT in place. check ABG. Keep SpO2 >92%  pull et tube 2 cm out   CARDIOVASCULAR: Avoid overload. Check TTE. LR at 70 cc/hour. Repeat CE x1. Check lactate.   CE   EKG Now     GI: PPI gtt. NPO for now. follow GI if no intervention proceed with SBT    RENAL:  Follow up lytes.  Correct as needed. Monitor I/Os.     INFECTIOUS DISEASE: Panculture. Zosyn for now. Check MRSA nares.     HEMATOLOGICAL:  Hold xarelto. SP TXA. Serial CBC. Monitor coags. LE duplex.     ENDOCRINE:  Follow up FS.  Insulin protocol if needed.    MUSCULOSKELETAL: Bed rest.     Full code -    MICU monitoring.

## 2023-10-26 NOTE — PROGRESS NOTE ADULT - SUBJECTIVE AND OBJECTIVE BOX
Gastroenterology Follow Up Note      Location: [address] ([address])  Patient Name: JAMES WOODWARD  Age: 88y  Gender: Female      Chief Complaint  Patient is a 88y old Female who presents with a chief complaint of GIB (26 Oct 2023 07:58)  Primary diagnosis of Gastrointestinal hemorrhage      Reason for Consult  Hematemesis      Progress Note  This morning patient was seen and examined at bedside.    Today is hospital day 1d.  Patient continued to be intubated and mechanically ventilated.  She is on stable pressor requirements: Levophed 0.5.  Per nurse and team, patient has had no recurrence of hematemesis.  Last BM: brown stool on 10/25.   She is still NPO.      Vital Signs in the last 24 hours   Vitals Summary T(C): 37.6 (10-26-23 @ 08:00), Max: 37.8 (10-25-23 @ 23:00)  HR: 124 (10-26-23 @ 12:00) (91 - 140)  BP: 90/52 (10-26-23 @ 12:00) (67/32 - 172/73)  RR: 20 (10-26-23 @ 12:00) (15 - 27)  SpO2: 99% (10-26-23 @ 12:00) (95% - 100%)  Vent Data Device: Avea, Mode: AC/ CMV (Assist Control/ Continuous Mandatory Ventilation), RR (machine): 20, TV (machine): 400, FiO2: 40, PEEP: 8, MAP: 12, PIP: 24  Intake/ Output   10-25-23 @ 07:01  -  10-26-23 @ 07:00  --------------------------------------------------------  IN: 2996.6 mL / OUT: 735 mL / NET: 2261.6 mL    10-26-23 @ 07:01  -  10-26-23 @ 13:10  --------------------------------------------------------  IN: 607.8 mL / OUT: 115 mL / NET: 492.8 mL      Measurements Height (cm): 165.1 (10-25-23 @ 16:00)  Weight (kg): 71 (10-25-23 @ 16:00)  BMI (kg/m2): 26 (10-25-23 @ 16:00)  BSA (m2): 1.78 (10-25-23 @ 16:00)      Physical Exam  * General Appearance: sedated, intubated, mechanically ventilated  * Lungs: Good bilateral air entry, intubated, mechanically ventilated  * Heart: Regular Rate and Rhythm, normal S1 and S2, no audible murmur, rub, or gallop  * Abdomen: Symmetric, non-distended, soft, non-tender, bowel sounds active all four quadrants, no masses      Investigations   Laboratory Workup      - CBC:                        10.9   18.26 )-----------( 182      ( 26 Oct 2023 11:19 )             34.1       - Hgb Trend:  10.9  10-26-23 @ 11:19  11.8  10-26-23 @ 05:05  13.0  10-26-23 @ 00:00  11.1  10-25-23 @ 15:09  12.1  10-25-23 @ 11:13      10-25-23 @ 07:01  -  10-26-23 @ 07:00  --------------------------------------------------------  IN: 331 mL        - Chemistry:  10-26    145  |  111<H>  |  49<H>  ----------------------------<  223<H>  4.2   |  22  |  0.9    Ca    8.1<L>      26 Oct 2023 05:05    TPro  5.4<L>  /  Alb  3.1<L>  /  TBili  0.5  /  DBili  x   /  AST  24  /  ALT  14  /  AlkPhos  49  10-26    Liver panel trend:  TBili 0.5   /   AST 24   /   ALT 14   /   AlkP 49   /   Tptn 5.4   /   Alb 3.1    /   DBili --      10-26  TBili 0.4   /   AST 25   /   ALT 15   /   AlkP 51   /   Tptn 5.9   /   Alb 3.1    /   DBili --      10-26  TBili 0.3   /   AST 13   /   ALT <5   /   AlkP 52   /   Tptn 6.1   /   Alb 3.4    /   DBili --      10-25      - Coagulation Studies:  PT/INR - ( 26 Oct 2023 05:05 )   PT: 13.70 sec;   INR: 1.20 ratio         PTT - ( 26 Oct 2023 05:05 )  PTT:25.9 sec    - ABG:  ABG - ( 26 Oct 2023 04:49 )  pH, Arterial: 7.37  pH, Blood: x     /  pCO2: 35    /  pO2: 187   / HCO3: 20    / Base Excess: -4.4  /  SaO2: 99.7        - Cardiac Markers:  CARDIAC MARKERS ( 26 Oct 2023 05:05 )  x     / <0.01 ng/mL / x     / x     / x      CARDIAC MARKERS ( 25 Oct 2023 12:52 )  x     / <0.01 ng/mL / x     / x     / x            Microbiological Workup  Urinalysis Basic - ( 26 Oct 2023 05:05 )    Color: x / Appearance: x / SG: x / pH: x  Gluc: 223 mg/dL / Ketone: x  / Bili: x / Urobili: x   Blood: x / Protein: x / Nitrite: x   Leuk Esterase: x / RBC: x / WBC x   Sq Epi: x / Non Sq Epi: x / Bacteria: x        Current Medications  Standing Medications  chlorhexidine 0.12% Liquid 15 milliLiter(s) Oral Mucosa every 12 hours  chlorhexidine 0.12% Liquid 15 milliLiter(s) Oral Mucosa two times a day  chlorhexidine 2% Cloths 1 Application(s) Topical <User Schedule>  dextrose 5%. 1000 milliLiter(s) (50 mL/Hr) IV Continuous <Continuous>  dextrose 5%. 1000 milliLiter(s) (100 mL/Hr) IV Continuous <Continuous>  dextrose 50% Injectable 12.5 Gram(s) IV Push once  dextrose 50% Injectable 25 Gram(s) IV Push once  dextrose 50% Injectable 25 Gram(s) IV Push once  fentaNYL   Infusion. 0.5 MICROgram(s)/kG/Hr (3.75 mL/Hr) IV Continuous <Continuous>  glucagon  Injectable 1 milliGRAM(s) IntraMuscular once  insulin lispro (ADMELOG) corrective regimen sliding scale   SubCutaneous three times a day before meals  lactated ringers. 1000 milliLiter(s) (75 mL/Hr) IV Continuous <Continuous>  norepinephrine Infusion 0.05 MICROgram(s)/kG/Min (3.52 mL/Hr) IV Continuous <Continuous>  pantoprazole Infusion 8 mG/Hr (10 mL/Hr) IV Continuous <Continuous>  piperacillin/tazobactam IVPB.. 3.375 Gram(s) IV Intermittent every 8 hours  propofol Infusion 5 MICROgram(s)/kG/Min (2.13 mL/Hr) IV Continuous <Continuous>  senna 2 Tablet(s) Oral at bedtime    PRN Medications  dextrose Oral Gel 15 Gram(s) Oral once PRN Blood Glucose LESS THAN 70 milliGRAM(s)/deciliter    Singles Doses Administered  (ADM OVERRIDE) 1 each &lt;see task&gt; GiveOnce  (ADM OVERRIDE) 1 each &lt;see task&gt; GiveOnce  (ADM OVERRIDE) 1 each &lt;see task&gt; GiveOnce  (ADM OVERRIDE) 1 each &lt;see task&gt; GiveOnce  (ADM OVERRIDE) 1 each &lt;see task&gt; GiveOnce  (ADM OVERRIDE) 1 each &lt;see task&gt; GiveOnce  (ADM OVERRIDE) 1 each &lt;see task&gt; GiveOnce  (ADM OVERRIDE) 1 each &lt;see task&gt; GiveOnce  (ADM OVERRIDE) 2 each &lt;see task&gt; GiveOnce  cefTRIAXone   IVPB 2000 milliGRAM(s) IV Intermittent once  erythromycin   IVPB 250 milliGRAM(s) IV Intermittent once  etomidate Injectable 20 milliGRAM(s) IV Push once  fentaNYL    Injectable 50 MICROGram(s) IV Push once  lactated ringers Bolus 1000 milliLiter(s) IV Bolus once  metoclopramide Injectable 10 milliGRAM(s) IV Push once  metoclopramide Injectable 10 milliGRAM(s) IV Push once  pantoprazole  Injectable 80 milliGRAM(s) IV Push Once  propofol Injectable 50 milliGRAM(s) IV Push once  rocuronium Injectable 10 milliGRAM(s) IV Push Once  sodium chloride 0.9% Bolus 1000 milliLiter(s) IV Bolus once  tranexamic acid IVPB 1000 milliGRAM(s) IV Intermittent Once

## 2023-10-26 NOTE — PROGRESS NOTE ADULT - ASSESSMENT
IMPRESSION:    Acute hypoxemic respiratory failure   UGIB    A. fib on xarelto   HO CVA     PLAN:    CNS: Sedation with fentanyl and precedex    HEENT: Oral care. ET care.     PULMONARY:  HOB @ 45 degrees. CXR noted. ETT in place. check ABG. Keep SpO2 >92%    CARDIOVASCULAR: Avoid overload. Check TTE. LR at 100 cc/hour. Repeat CE x1. Check lactate.     GI: PPI gtt. NPO for now. Planned for EGD with GI today. s/p 1U PRBC, 2U on hold for endo. Target Hgb>8. CT abdomen/pelvis angiogram when stable. Trend BUN. Hold Xarelto.     RENAL:  Follow up lytes.  Correct as needed. Monitor I/Os.     INFECTIOUS DISEASE: Panculture. Zosyn for now. Check MRSA nares.     HEMATOLOGICAL:  Hold xarelto. SP TXA. Serial CBC. Monitor coags. LE duplex.     ENDOCRINE:  Follow up FS.  Insulin protocol if needed.    MUSCULOSKELETAL: Bed rest.     Full code - DW patient's daughter at bedside   MICU monitoring.

## 2023-10-26 NOTE — PROGRESS NOTE ADULT - ASSESSMENT
Assessment and Plan  Case of an 88 year old female patient known to have DM, HTN, DL, CVA, and atrial fibrillation on Xarelto who was brought to the ED on 10/25 for evaluation of episodes of hematemesis, found to be hypotensive and tachycardic, admitted for further management. Stay complicated by recurrence of hematemesis requiring intubation for airway protection. We are consulted for concern of upper GI bleeding.      Hematemesis   Likely Secondary to Bleeding Gastric Dieulafoy Lesions Status Post 4 Endoclips 10/25  Stable Hb  * Baseline hemoglobin (prior to admission): Hb 14.2 in 2022  * Still Intubated since 10/25; Vitals 10/26: 128/58mmHg, HR 96 bpm, T 37.7 degrees; on stable pressor requirements  * Hemoglobin on admission: Hb 12.1/11.1/10.9 s/p 1 unit pRBC  * Coags: INR 1.2 on 10/25 s/p Tranexamic acid. Last use of Xarelto (10/24)  * CIARRA not performed as patient was being intubated  * No current abdominal imaging; No prior EGD  * Family History: negative for GI malignancies  * Status post EGD 10/25: Normal mucosa in the whole esophagus. Erosive gastritis. Erythematous oozing lesions in the stomach body concerning for Dieulafoy lesions (endoclips x 4). A large amount of coffee ground material and blood was seen in the duodenal bulb and second part of duodenum. After irrigation, cleaning, and carefully examining under water, there was no evidence of active bleeding or ulcer. .    RECOMMENDATIONS  - Continue ICU monitoring  - Monitor CBC q12. Transfuse PRN to Keep Hb >8. Active type and screen, 2 x 18 gauge IVs  - Switch to IV Protonix 40mg BID  - Keep NPO for now and consider advancing diet if Hb stable  - Monitor MAP and keep >65mmHg. Currently on levophed 0.5  - ENT evaluation to r/o posterior nasal bleed  - Holding anticoagulation for now in setting of recent hematemesis. Will need to discuss risks and benefits of anticoagulation resumption using CHADSVASC and HASBLED scores with patient and family prior to resumption  - Monitor BM for melena or hematochezia   - Please avoid any NSAIDs  - If active bleeding with hemodynamic instability please obtain CTA and inform GI STAT      Thank you for your consult.  - Please note that plan was communicated with medical team.   - Please reach GI on [phone] during weekdays till 5pm.  - Please call the GI service line after 5pm on Weekdays and anytime on Weekends: [phone].      Felipe Segal MD  PGY - 4 Gastroenterology Fellow   [facility]   Assessment and Plan  Case of an 88 year old female patient known to have DM, HTN, DL, CVA, and atrial fibrillation on Xarelto who was brought to the ED on 10/25 for evaluation of episodes of hematemesis, found to be hypotensive and tachycardic, admitted for further management. Stay complicated by recurrence of hematemesis requiring intubation for airway protection. We are consulted for concern of upper GI bleeding.      Hematemesis w/ Hemorrhagic shock secondary to upper GI bleed  Likely Secondary to Bleeding Gastric Dieulafoy Lesions Status Post 4 Endoclips 10/25  Stable Hb  * Baseline hemoglobin (prior to admission): Hb 14.2 in 2022  * Still Intubated since 10/25; Vitals 10/26: 128/58mmHg, HR 96 bpm, T 37.7 degrees; on stable pressor requirements  * Hemoglobin on admission: Hb 12.1/11.1/10.9 s/p 1 unit pRBC  * Coags: INR 1.2 on 10/25 s/p Tranexamic acid. Last use of Xarelto (10/24)  * CIARRA not performed as patient was being intubated  * No current abdominal imaging; No prior EGD  * Family History: negative for GI malignancies  * Status post EGD 10/25: Normal mucosa in the whole esophagus. Erosive gastritis. Erythematous oozing lesions in the stomach body concerning for Dieulafoy lesions (endoclips x 4). A large amount of coffee ground material and blood was seen in the duodenal bulb and second part of duodenum. After irrigation, cleaning, and carefully examining under water, there was no evidence of active bleeding or ulcer. .    RECOMMENDATIONS  - Continue ICU monitoring  - Monitor CBC q12. Transfuse PRN to Keep Hb >8. Active type and screen, 2 x 18 gauge IVs  - Switch to IV Protonix 40mg BID  - Keep NPO for now and if Hb stable w/ no further bleeding, ok to start OG feeds  - Monitor MAP and keep >65mmHg. Currently on levophed 0.5  - ENT evaluation to r/o posterior nasal bleed  - Holding anticoagulation for now in setting of recent hematemesis. Will need to discuss risks and benefits of anticoagulation resumption using CHADSVASC and HASBLED scores with patient and family prior to resumption  - Monitor BM for melena or hematochezia   - Please avoid any NSAIDs  - If active bleeding with hemodynamic instability please obtain CTA and inform GI STAT      Thank you for your consult.  - Please note that plan was communicated with medical team.   - Please reach GI on [phone] during weekdays till 5pm.  - Please call the GI service line after 5pm on Weekdays and anytime on Weekends: [phone].      Felipe Segal MD  PGY - 4 Gastroenterology Fellow   [facility]   Assessment and Plan  Case of an 88 year old female patient known to have DM, HTN, DL, CVA, and atrial fibrillation on Xarelto who was brought to the ED on 10/25 for evaluation of episodes of hematemesis, found to be hypotensive and tachycardic, admitted for further management. Stay complicated by recurrence of hematemesis requiring intubation for airway protection. We are consulted for concern of upper GI bleeding.      Hematemesis w/ Hemorrhagic shock secondary to upper GI bleed  Likely Secondary to Bleeding Gastric Dieulafoy Lesions Status Post 4 Endoclips 10/25  Stable Hb  * Baseline hemoglobin (prior to admission): Hb 14.2 in 2022  * Still Intubated since 10/25; Vitals 10/26: 128/58mmHg, HR 96 bpm, T 37.7 degrees; on stable pressor requirements  * Hemoglobin on admission: Hb 12.1/11.1/10.9 s/p 1 unit pRBC  * Coags: INR 1.2 on 10/25 s/p Tranexamic acid. Last use of Xarelto (10/24)  * CIARRA not performed as patient was being intubated  * No current abdominal imaging; No prior EGD  * Family History: negative for GI malignancies  * Status post EGD 10/25: Normal mucosa in the whole esophagus. Erosive gastritis. Erythematous oozing lesions in the stomach body concerning for Dieulafoy lesions (endoclips x 4). A large amount of coffee ground material and blood was seen in the duodenal bulb and second part of duodenum. After irrigation, cleaning, and carefully examining under water, there was no evidence of active bleeding or ulcer. .    RECOMMENDATIONS  - Continue ICU monitoring  - Monitor CBC q12. Transfuse PRN to Keep Hb >8. Active type and screen, 2 x 18 gauge IVs  - Switch to IV Protonix 40mg BID  - Keep NPO for now and if Hb stable w/ no further bleeding, ok to start OG feeds  - Monitor MAP and keep >65mmHg. Currently on levophed 0.5  - ENT evaluation to r/o posterior nasal bleed  - Holding anticoagulation for now in setting of recent hematemesis and hemorrhagic shock. Will need to discuss risks and benefits of anticoagulation resumption using CHADSVASC and HASBLED scores with patient and family.  in the meantime, can resume reversible anticoagulation with heparin or lovenox and monitor very closely for recurrent bleeding episodes (hematemesis, coffee ground emesis, melena or hematochezia)  - Please avoid any NSAIDs  - If active bleeding with hemodynamic instability please obtain CTA and inform GI STAT      Thank you for your consult.  - Please note that plan was communicated with medical team.   - Please reach GI on [phone] during weekdays till 5pm.  - Please call the GI service line after 5pm on Weekdays and anytime on Weekends: [phone].      Felipe Segal MD  PGY - 4 Gastroenterology Fellow   [facility]

## 2023-10-26 NOTE — PROGRESS NOTE ADULT - SUBJECTIVE AND OBJECTIVE BOX
Patient is a 88y old  Female who presents with a chief complaint of GIB (26 Oct 2023 06:38)      Over Night Events:  Patient seen and examined.   still inyubated   on levo 0.05   on fentanyl propofol   s/p scope and clipps placement gastric     ROS:  See HPI    PHYSICAL EXAM    ICU Vital Signs Last 24 Hrs  T(C): 37.6 (26 Oct 2023 08:00), Max: 37.8 (25 Oct 2023 23:00)  T(F): 99.6 (26 Oct 2023 08:00), Max: 100 (25 Oct 2023 23:00)  HR: 94 (26 Oct 2023 07:00) (91 - 140)  BP: 141/61 (26 Oct 2023 07:00) (67/32 - 172/73)  BP(mean): 88 (26 Oct 2023 07:00) (56 - 117)  ABP: --  ABP(mean): --  RR: 20 (26 Oct 2023 07:00) (15 - 27)  SpO2: 100% (26 Oct 2023 07:00) (95% - 100%)    O2 Parameters below as of 26 Oct 2023 08:00  Patient On (Oxygen Delivery Method): ventilator    O2 Concentration (%): 40        General: on sedation   HEENT:      et tube           Lymph Nodes: NO cervical LN   Lungs: Bilateral BS  Cardiovascular: Regular   Abdomen: Soft, Positive BS  Extremities: No clubbing   Skin: warm   Neurological: positive ag   Musculoskeletal: move all ext     I&O's Detail    25 Oct 2023 07:01  -  26 Oct 2023 07:00  --------------------------------------------------------  IN:    FentaNYL: 196 mL    IV PiggyBack: 200 mL    Lactated Ringers: 1400 mL    Norepinephrine: 602.9 mL    Pantoprazole: 160 mL    PRBCs (Packed Red Blood Cells): 331 mL    Propofol: 36.3 mL    Propofol: 70.4 mL  Total IN: 2996.6 mL    OUT:    Indwelling Catheter - Urethral (mL): 735 mL  Total OUT: 735 mL    Total NET: 2261.6 mL      26 Oct 2023 07:01  -  26 Oct 2023 07:58  --------------------------------------------------------  IN:    FentaNYL: 14.2 mL    Norepinephrine: 20 mL    Pantoprazole: 10 mL    Propofol: 6.4 mL  Total IN: 50.6 mL    OUT:    Lactated Ringers: 0 mL  Total OUT: 0 mL    Total NET: 50.6 mL          LABS:                          11.8   20.09 )-----------( 229      ( 26 Oct 2023 05:05 )             36.6         26 Oct 2023 05:05    145    |  111    |  49     ----------------------------<  223    4.2     |  22     |  0.9      Ca    8.1        26 Oct 2023 05:05    TPro  5.4    /  Alb  3.1    /  TBili  0.5    /  DBili  x      /  AST  24     /  ALT  14     /  AlkPhos  49     26 Oct 2023 05:05  Amylase x     lipase x                                                 PT/INR - ( 26 Oct 2023 05:05 )   PT: 13.70 sec;   INR: 1.20 ratio         PTT - ( 26 Oct 2023 05:05 )  PTT:25.9 sec                                       Urinalysis Basic - ( 26 Oct 2023 05:05 )    Color: x / Appearance: x / SG: x / pH: x  Gluc: 223 mg/dL / Ketone: x  / Bili: x / Urobili: x   Blood: x / Protein: x / Nitrite: x   Leuk Esterase: x / RBC: x / WBC x   Sq Epi: x / Non Sq Epi: x / Bacteria: x          CARDIAC MARKERS ( 26 Oct 2023 05:05 )  x     / <0.01 ng/mL / x     / x     / x      CARDIAC MARKERS ( 25 Oct 2023 12:52 )  x     / <0.01 ng/mL / x     / x     / x                                                                                                         Mode: AC/ CMV (Assist Control/ Continuous Mandatory Ventilation)  RR (machine): 20  TV (machine): 400  FiO2: 50  PEEP: 8  MAP: 11  PIP: 23                                      ABG - ( 26 Oct 2023 04:49 )  pH, Arterial: 7.37  pH, Blood: x     /  pCO2: 35    /  pO2: 187   / HCO3: 20    / Base Excess: -4.4  /  SaO2: 99.7                MEDICATIONS  (STANDING):  chlorhexidine 0.12% Liquid 15 milliLiter(s) Oral Mucosa two times a day  chlorhexidine 0.12% Liquid 15 milliLiter(s) Oral Mucosa every 12 hours  chlorhexidine 2% Cloths 1 Application(s) Topical <User Schedule>  dextrose 5%. 1000 milliLiter(s) (100 mL/Hr) IV Continuous <Continuous>  dextrose 5%. 1000 milliLiter(s) (50 mL/Hr) IV Continuous <Continuous>  dextrose 50% Injectable 12.5 Gram(s) IV Push once  dextrose 50% Injectable 25 Gram(s) IV Push once  dextrose 50% Injectable 25 Gram(s) IV Push once  fentaNYL   Infusion. 0.5 MICROgram(s)/kG/Hr (3.75 mL/Hr) IV Continuous <Continuous>  glucagon  Injectable 1 milliGRAM(s) IntraMuscular once  insulin lispro (ADMELOG) corrective regimen sliding scale   SubCutaneous three times a day before meals  lactated ringers. 1000 milliLiter(s) (100 mL/Hr) IV Continuous <Continuous>  norepinephrine Infusion 0.05 MICROgram(s)/kG/Min (3.52 mL/Hr) IV Continuous <Continuous>  pantoprazole Infusion 8 mG/Hr (10 mL/Hr) IV Continuous <Continuous>  piperacillin/tazobactam IVPB.. 3.375 Gram(s) IV Intermittent every 8 hours  propofol Infusion 5 MICROgram(s)/kG/Min (2.13 mL/Hr) IV Continuous <Continuous>  senna 2 Tablet(s) Oral at bedtime    MEDICATIONS  (PRN):  dextrose Oral Gel 15 Gram(s) Oral once PRN Blood Glucose LESS THAN 70 milliGRAM(s)/deciliter          Xrays:  TLC:  OG:  ET tube:   deep                                                                                  no infiltrate    ECHO:  CAM ICU:

## 2023-10-26 NOTE — PROGRESS NOTE ADULT - SUBJECTIVE AND OBJECTIVE BOX
CHIEF COMPLAINT:  Patient is a 88y old  Female who presents with a chief complaint of GIB (25 Oct 2023 21:16)      INTERVAL HISTORY/OVERNIGHT EVENTS:  88-year-old female with a past medical history significant for diabetes CVA atrial fibrillation hypertension on Xarelto who presents due to concern for upper GI bleed.  As per nursing home patient was having an episode of hematemesis patient in route was hemodynamically stable however when presented to the ED patient was noted to be hypotensive and tachycardic.  Patient had a witnessed episode of bright red blood vomiting.  At that point patient became more lethargic.  Patient's daughter at bedside had an extensive conversation with patient current CODE STATUS.  Patient and daughter both would like to be kept full code.  At that point made a decision with family aware to intubate patient due to concern for airway protection.  GI at bedside.  Patient given TXA ceftriaxone PPI bolus PPI drip.  Patient intubated and blood pressure improved with fluids.    Overnight events: EGD ET tube readjusted by anesthesia, blood clot in the oral cavity was suctioned.     CCU 10/26-    ======================  MEDICATIONS:  chlorhexidine 0.12% Liquid 15 milliLiter(s) Oral Mucosa every 12 hours  chlorhexidine 0.12% Liquid 15 milliLiter(s) Oral Mucosa two times a day  chlorhexidine 2% Cloths 1 Application(s) Topical <User Schedule>  dextrose 50% Injectable 12.5 Gram(s) IV Push once  dextrose 50% Injectable 25 Gram(s) IV Push once  dextrose 50% Injectable 25 Gram(s) IV Push once  glucagon  Injectable 1 milliGRAM(s) IntraMuscular once  insulin lispro (ADMELOG) corrective regimen sliding scale   SubCutaneous three times a day before meals  piperacillin/tazobactam IVPB.. 3.375 Gram(s) IV Intermittent every 8 hours  senna 2 Tablet(s) Oral at bedtime    DRIPS:  dextrose 5%. 1000 milliLiter(s) (50 mL/Hr) IV Continuous <Continuous>  dextrose 5%. 1000 milliLiter(s) (100 mL/Hr) IV Continuous <Continuous>  fentaNYL   Infusion. 0.5 MICROgram(s)/kG/Hr (3.75 mL/Hr) IV Continuous <Continuous>  lactated ringers. 1000 milliLiter(s) (100 mL/Hr) IV Continuous <Continuous>  norepinephrine Infusion 0.05 MICROgram(s)/kG/Min (3.52 mL/Hr) IV Continuous <Continuous>  pantoprazole Infusion 8 mG/Hr (10 mL/Hr) IV Continuous <Continuous>  propofol Infusion 5 MICROgram(s)/kG/Min (2.13 mL/Hr) IV Continuous <Continuous>    PRN:       ======================  PHYSICAL EXAMINATION:  GEN:  nad.   HEENT:  eomi. ncat  PULM:  b/l lung sounds   CARD: s1, s2  ABD: +bs. ntnd  EXT:  no new rashes.    NEURO:  no new focal deficits.   ======================  OBJECTIVE:    Device: Avea, Mode: AC/ CMV (Assist Control/ Continuous Mandatory Ventilation), RR (machine): 20, TV (machine): 400, FiO2: 50, PEEP: 8, MAP: 11, PIP: 23    VS:  T(F): 99.8 (10-26 @ 04:00), Max: 100 (10-25 @ 23:00)  HR: 96 (10-26 @ 06:00) (91 - 140)  BP: 128/58 (10-26 @ 06:00) (67/32 - 172/73)  RR: 20 (10-26 @ 06:00) (15 - 27)  SpO2: 100% (10-26 @ 06:00) (95% - 100%)  CVP(mm Hg): --  CO: --  CI: --  PA: --  PCWP: --    I/O:      10-25 @ 07:01  -  10-26 @ 06:39  --------------------------------------------------------  IN: 2996.6 mL / OUT: 735 mL / NET: 2261.6 mL        Weight trend:  Weight (kg): 71 (10-25)    ======================    LABS:  ABG - ( 26 Oct 2023 04:49 )  pH, Arterial: 7.37  pH, Blood: x     /  pCO2: 35    /  pO2: 187   / HCO3: 20    / Base Excess: -4.4  /  SaO2: 99.7                                    11.8   20.09 )-----------( 229      ( 26 Oct 2023 05:05 )             36.6     10-26    145  |  111<H>  |  49<H>  ----------------------------<  223<H>  4.2   |  22  |  0.9    Ca    8.1<L>      26 Oct 2023 05:05    TPro  5.4<L>  /  Alb  3.1<L>  /  TBili  0.5  /  DBili  x   /  AST  24  /  ALT  14  /  AlkPhos  49  10-26    LIVER FUNCTIONS - ( 26 Oct 2023 05:05 )  Alb: 3.1 g/dL / Pro: 5.4 g/dL / ALK PHOS: 49 U/L / ALT: 14 U/L / AST: 24 U/L / GGT: x           PT/INR - ( 26 Oct 2023 05:05 )   PT: 13.70 sec;   INR: 1.20 ratio         PTT - ( 26 Oct 2023 05:05 )  PTT:25.9 sec  Troponin T, Serum: <0.01 ng/mL (10-26 @ 05:05)  Troponin T, Serum: <0.01 ng/mL (10-25 @ 12:52)    CARDIAC MARKERS ( 26 Oct 2023 05:05 )  x     / <0.01 ng/mL / x     / x     / x      CARDIAC MARKERS ( 25 Oct 2023 12:52 )  x     / <0.01 ng/mL / x     / x     / x            Urinalysis Basic - ( 26 Oct 2023 05:05 )    Color: x / Appearance: x / SG: x / pH: x  Gluc: 223 mg/dL / Ketone: x  / Bili: x / Urobili: x   Blood: x / Protein: x / Nitrite: x   Leuk Esterase: x / RBC: x / WBC x   Sq Epi: x / Non Sq Epi: x / Bacteria: x        Cultures:         CHIEF COMPLAINT:  Patient is a 88y old  Female who presents with a chief complaint of GIB (25 Oct 2023 21:16)      INTERVAL HISTORY/OVERNIGHT EVENTS:  88-year-old female with a past medical history significant for diabetes CVA atrial fibrillation hypertension on Xarelto who presents due to concern for upper GI bleed.  As per nursing home patient was having an episode of hematemesis patient in route was hemodynamically stable however when presented to the ED patient was noted to be hypotensive and tachycardic.  Patient had a witnessed episode of bright red blood vomiting.  At that point patient became more lethargic.  Patient's daughter at bedside had an extensive conversation with patient current CODE STATUS.  Patient and daughter both would like to be kept full code.  At that point made a decision with family aware to intubate patient due to concern for airway protection.  GI at bedside.  Patient given TXA ceftriaxone PPI bolus PPI drip.  Patient intubated and blood pressure improved with fluids.    Overnight events: EGD ET tube readjusted by anesthesia, blood clot in the oral cavity was suctioned.     CCU 10/26- Duplex neg, SAT/SBT tomorrow, hold sedation at 6am Resume heparin tomorrow. Repeat CBC. LE duplex neg    ======================  MEDICATIONS:  chlorhexidine 0.12% Liquid 15 milliLiter(s) Oral Mucosa every 12 hours  chlorhexidine 0.12% Liquid 15 milliLiter(s) Oral Mucosa two times a day  chlorhexidine 2% Cloths 1 Application(s) Topical <User Schedule>  dextrose 50% Injectable 12.5 Gram(s) IV Push once  dextrose 50% Injectable 25 Gram(s) IV Push once  dextrose 50% Injectable 25 Gram(s) IV Push once  glucagon  Injectable 1 milliGRAM(s) IntraMuscular once  insulin lispro (ADMELOG) corrective regimen sliding scale   SubCutaneous three times a day before meals  piperacillin/tazobactam IVPB.. 3.375 Gram(s) IV Intermittent every 8 hours  senna 2 Tablet(s) Oral at bedtime    DRIPS:  dextrose 5%. 1000 milliLiter(s) (50 mL/Hr) IV Continuous <Continuous>  dextrose 5%. 1000 milliLiter(s) (100 mL/Hr) IV Continuous <Continuous>  fentaNYL   Infusion. 0.5 MICROgram(s)/kG/Hr (3.75 mL/Hr) IV Continuous <Continuous>  lactated ringers. 1000 milliLiter(s) (100 mL/Hr) IV Continuous <Continuous>  norepinephrine Infusion 0.05 MICROgram(s)/kG/Min (3.52 mL/Hr) IV Continuous <Continuous>  pantoprazole Infusion 8 mG/Hr (10 mL/Hr) IV Continuous <Continuous>  propofol Infusion 5 MICROgram(s)/kG/Min (2.13 mL/Hr) IV Continuous <Continuous>    PRN:       ======================  PHYSICAL EXAMINATION:  GEN:  nad.   HEENT:  eomi. ncat  PULM:  b/l lung sounds   CARD: s1, s2  ABD: +bs. ntnd  EXT:  no new rashes.    NEURO:  no new focal deficits.   ======================  OBJECTIVE:    Device: Avea, Mode: AC/ CMV (Assist Control/ Continuous Mandatory Ventilation), RR (machine): 20, TV (machine): 400, FiO2: 50, PEEP: 8, MAP: 11, PIP: 23    VS:  T(F): 99.8 (10-26 @ 04:00), Max: 100 (10-25 @ 23:00)  HR: 96 (10-26 @ 06:00) (91 - 140)  BP: 128/58 (10-26 @ 06:00) (67/32 - 172/73)  RR: 20 (10-26 @ 06:00) (15 - 27)  SpO2: 100% (10-26 @ 06:00) (95% - 100%)  CVP(mm Hg): --  CO: --  CI: --  PA: --  PCWP: --    I/O:      10-25 @ 07:01  -  10-26 @ 06:39  --------------------------------------------------------  IN: 2996.6 mL / OUT: 735 mL / NET: 2261.6 mL        Weight trend:  Weight (kg): 71 (10-25)    ======================    LABS:  ABG - ( 26 Oct 2023 04:49 )  pH, Arterial: 7.37  pH, Blood: x     /  pCO2: 35    /  pO2: 187   / HCO3: 20    / Base Excess: -4.4  /  SaO2: 99.7                                    11.8   20.09 )-----------( 229      ( 26 Oct 2023 05:05 )             36.6     10-26    145  |  111<H>  |  49<H>  ----------------------------<  223<H>  4.2   |  22  |  0.9    Ca    8.1<L>      26 Oct 2023 05:05    TPro  5.4<L>  /  Alb  3.1<L>  /  TBili  0.5  /  DBili  x   /  AST  24  /  ALT  14  /  AlkPhos  49  10-26    LIVER FUNCTIONS - ( 26 Oct 2023 05:05 )  Alb: 3.1 g/dL / Pro: 5.4 g/dL / ALK PHOS: 49 U/L / ALT: 14 U/L / AST: 24 U/L / GGT: x           PT/INR - ( 26 Oct 2023 05:05 )   PT: 13.70 sec;   INR: 1.20 ratio         PTT - ( 26 Oct 2023 05:05 )  PTT:25.9 sec  Troponin T, Serum: <0.01 ng/mL (10-26 @ 05:05)  Troponin T, Serum: <0.01 ng/mL (10-25 @ 12:52)    CARDIAC MARKERS ( 26 Oct 2023 05:05 )  x     / <0.01 ng/mL / x     / x     / x      CARDIAC MARKERS ( 25 Oct 2023 12:52 )  x     / <0.01 ng/mL / x     / x     / x            Urinalysis Basic - ( 26 Oct 2023 05:05 )    Color: x / Appearance: x / SG: x / pH: x  Gluc: 223 mg/dL / Ketone: x  / Bili: x / Urobili: x   Blood: x / Protein: x / Nitrite: x   Leuk Esterase: x / RBC: x / WBC x   Sq Epi: x / Non Sq Epi: x / Bacteria: x        Cultures:

## 2023-10-27 LAB
ALBUMIN SERPL ELPH-MCNC: 2.2 G/DL — LOW (ref 3.5–5.2)
ALBUMIN SERPL ELPH-MCNC: 2.2 G/DL — LOW (ref 3.5–5.2)
ALP SERPL-CCNC: 41 U/L — SIGNIFICANT CHANGE UP (ref 30–115)
ALP SERPL-CCNC: 41 U/L — SIGNIFICANT CHANGE UP (ref 30–115)
ALT FLD-CCNC: 20 U/L — SIGNIFICANT CHANGE UP (ref 0–41)
ALT FLD-CCNC: 20 U/L — SIGNIFICANT CHANGE UP (ref 0–41)
ANION GAP SERPL CALC-SCNC: 7 MMOL/L — SIGNIFICANT CHANGE UP (ref 7–14)
AST SERPL-CCNC: 23 U/L — SIGNIFICANT CHANGE UP (ref 0–41)
AST SERPL-CCNC: 23 U/L — SIGNIFICANT CHANGE UP (ref 0–41)
BASE EXCESS BLDA CALC-SCNC: -0.8 MMOL/L — SIGNIFICANT CHANGE UP (ref -2–3)
BASE EXCESS BLDA CALC-SCNC: -0.8 MMOL/L — SIGNIFICANT CHANGE UP (ref -2–3)
BASOPHILS # BLD AUTO: 0.04 K/UL — SIGNIFICANT CHANGE UP (ref 0–0.2)
BASOPHILS # BLD AUTO: 0.04 K/UL — SIGNIFICANT CHANGE UP (ref 0–0.2)
BASOPHILS # BLD AUTO: 0.05 K/UL — SIGNIFICANT CHANGE UP (ref 0–0.2)
BASOPHILS NFR BLD AUTO: 0.3 % — SIGNIFICANT CHANGE UP (ref 0–1)
BASOPHILS NFR BLD AUTO: 0.4 % — SIGNIFICANT CHANGE UP (ref 0–1)
BILIRUB SERPL-MCNC: 0.4 MG/DL — SIGNIFICANT CHANGE UP (ref 0.2–1.2)
BILIRUB SERPL-MCNC: 0.4 MG/DL — SIGNIFICANT CHANGE UP (ref 0.2–1.2)
BLD GP AB SCN SERPL QL: SIGNIFICANT CHANGE UP
BLD GP AB SCN SERPL QL: SIGNIFICANT CHANGE UP
BUN SERPL-MCNC: 24 MG/DL — HIGH (ref 10–20)
BUN SERPL-MCNC: 24 MG/DL — HIGH (ref 10–20)
BUN SERPL-MCNC: 27 MG/DL — HIGH (ref 10–20)
BUN SERPL-MCNC: 27 MG/DL — HIGH (ref 10–20)
CALCIUM SERPL-MCNC: 6.6 MG/DL — LOW (ref 8.4–10.5)
CALCIUM SERPL-MCNC: 6.6 MG/DL — LOW (ref 8.4–10.5)
CALCIUM SERPL-MCNC: 7.7 MG/DL — LOW (ref 8.4–10.5)
CALCIUM SERPL-MCNC: 7.7 MG/DL — LOW (ref 8.4–10.5)
CHLORIDE SERPL-SCNC: 111 MMOL/L — HIGH (ref 98–110)
CHLORIDE SERPL-SCNC: 111 MMOL/L — HIGH (ref 98–110)
CHLORIDE SERPL-SCNC: 117 MMOL/L — HIGH (ref 98–110)
CHLORIDE SERPL-SCNC: 117 MMOL/L — HIGH (ref 98–110)
CO2 SERPL-SCNC: 22 MMOL/L — SIGNIFICANT CHANGE UP (ref 17–32)
CO2 SERPL-SCNC: 22 MMOL/L — SIGNIFICANT CHANGE UP (ref 17–32)
CO2 SERPL-SCNC: 23 MMOL/L — SIGNIFICANT CHANGE UP (ref 17–32)
CO2 SERPL-SCNC: 23 MMOL/L — SIGNIFICANT CHANGE UP (ref 17–32)
CREAT SERPL-MCNC: 0.6 MG/DL — LOW (ref 0.7–1.5)
CREAT SERPL-MCNC: 0.6 MG/DL — LOW (ref 0.7–1.5)
CREAT SERPL-MCNC: 0.7 MG/DL — SIGNIFICANT CHANGE UP (ref 0.7–1.5)
CREAT SERPL-MCNC: 0.7 MG/DL — SIGNIFICANT CHANGE UP (ref 0.7–1.5)
EGFR: 84 ML/MIN/1.73M2 — SIGNIFICANT CHANGE UP
EGFR: 84 ML/MIN/1.73M2 — SIGNIFICANT CHANGE UP
EGFR: 87 ML/MIN/1.73M2 — SIGNIFICANT CHANGE UP
EGFR: 87 ML/MIN/1.73M2 — SIGNIFICANT CHANGE UP
EOSINOPHIL # BLD AUTO: 0 K/UL — SIGNIFICANT CHANGE UP (ref 0–0.7)
EOSINOPHIL # BLD AUTO: 0 K/UL — SIGNIFICANT CHANGE UP (ref 0–0.7)
EOSINOPHIL # BLD AUTO: 0.01 K/UL — SIGNIFICANT CHANGE UP (ref 0–0.7)
EOSINOPHIL NFR BLD AUTO: 0 % — SIGNIFICANT CHANGE UP (ref 0–8)
EOSINOPHIL NFR BLD AUTO: 0 % — SIGNIFICANT CHANGE UP (ref 0–8)
EOSINOPHIL NFR BLD AUTO: 0.1 % — SIGNIFICANT CHANGE UP (ref 0–8)
GLUCOSE BLDC GLUCOMTR-MCNC: 162 MG/DL — HIGH (ref 70–99)
GLUCOSE BLDC GLUCOMTR-MCNC: 162 MG/DL — HIGH (ref 70–99)
GLUCOSE BLDC GLUCOMTR-MCNC: 176 MG/DL — HIGH (ref 70–99)
GLUCOSE BLDC GLUCOMTR-MCNC: 176 MG/DL — HIGH (ref 70–99)
GLUCOSE BLDC GLUCOMTR-MCNC: 190 MG/DL — HIGH (ref 70–99)
GLUCOSE BLDC GLUCOMTR-MCNC: 190 MG/DL — HIGH (ref 70–99)
GLUCOSE SERPL-MCNC: 145 MG/DL — HIGH (ref 70–99)
GLUCOSE SERPL-MCNC: 145 MG/DL — HIGH (ref 70–99)
GLUCOSE SERPL-MCNC: 157 MG/DL — HIGH (ref 70–99)
GLUCOSE SERPL-MCNC: 157 MG/DL — HIGH (ref 70–99)
HCO3 BLDA-SCNC: 22 MMOL/L — SIGNIFICANT CHANGE UP (ref 21–28)
HCO3 BLDA-SCNC: 22 MMOL/L — SIGNIFICANT CHANGE UP (ref 21–28)
HCT VFR BLD CALC: 27.7 % — LOW (ref 37–47)
HCT VFR BLD CALC: 27.7 % — LOW (ref 37–47)
HCT VFR BLD CALC: 27.9 % — LOW (ref 37–47)
HCT VFR BLD CALC: 27.9 % — LOW (ref 37–47)
HCT VFR BLD CALC: 28.8 % — LOW (ref 37–47)
HCT VFR BLD CALC: 28.8 % — LOW (ref 37–47)
HCT VFR BLD CALC: 29.1 % — LOW (ref 37–47)
HGB BLD-MCNC: 8.9 G/DL — LOW (ref 12–16)
HGB BLD-MCNC: 8.9 G/DL — LOW (ref 12–16)
HGB BLD-MCNC: 9.2 G/DL — LOW (ref 12–16)
HGB BLD-MCNC: 9.2 G/DL — LOW (ref 12–16)
HGB BLD-MCNC: 9.4 G/DL — LOW (ref 12–16)
HGB BLD-MCNC: 9.4 G/DL — LOW (ref 12–16)
HGB BLD-MCNC: 9.5 G/DL — LOW (ref 12–16)
HGB BLD-MCNC: 9.5 G/DL — LOW (ref 12–16)
HGB BLD-MCNC: 9.6 G/DL — LOW (ref 12–16)
HGB BLD-MCNC: 9.6 G/DL — LOW (ref 12–16)
HOROWITZ INDEX BLDA+IHG-RTO: 40 — SIGNIFICANT CHANGE UP
HOROWITZ INDEX BLDA+IHG-RTO: 40 — SIGNIFICANT CHANGE UP
IMM GRANULOCYTES NFR BLD AUTO: 0.4 % — HIGH (ref 0.1–0.3)
IMM GRANULOCYTES NFR BLD AUTO: 0.5 % — HIGH (ref 0.1–0.3)
IMM GRANULOCYTES NFR BLD AUTO: 0.5 % — HIGH (ref 0.1–0.3)
IMM GRANULOCYTES NFR BLD AUTO: 1.2 % — HIGH (ref 0.1–0.3)
IMM GRANULOCYTES NFR BLD AUTO: 1.2 % — HIGH (ref 0.1–0.3)
LYMPHOCYTES # BLD AUTO: 1.9 K/UL — SIGNIFICANT CHANGE UP (ref 1.2–3.4)
LYMPHOCYTES # BLD AUTO: 1.9 K/UL — SIGNIFICANT CHANGE UP (ref 1.2–3.4)
LYMPHOCYTES # BLD AUTO: 1.98 K/UL — SIGNIFICANT CHANGE UP (ref 1.2–3.4)
LYMPHOCYTES # BLD AUTO: 1.98 K/UL — SIGNIFICANT CHANGE UP (ref 1.2–3.4)
LYMPHOCYTES # BLD AUTO: 13 % — LOW (ref 20.5–51.1)
LYMPHOCYTES # BLD AUTO: 13 % — LOW (ref 20.5–51.1)
LYMPHOCYTES # BLD AUTO: 14 % — LOW (ref 20.5–51.1)
LYMPHOCYTES # BLD AUTO: 14 % — LOW (ref 20.5–51.1)
LYMPHOCYTES # BLD AUTO: 15.3 % — LOW (ref 20.5–51.1)
LYMPHOCYTES # BLD AUTO: 15.3 % — LOW (ref 20.5–51.1)
LYMPHOCYTES # BLD AUTO: 18 % — LOW (ref 20.5–51.1)
LYMPHOCYTES # BLD AUTO: 18 % — LOW (ref 20.5–51.1)
LYMPHOCYTES # BLD AUTO: 2.09 K/UL — SIGNIFICANT CHANGE UP (ref 1.2–3.4)
LYMPHOCYTES # BLD AUTO: 2.09 K/UL — SIGNIFICANT CHANGE UP (ref 1.2–3.4)
LYMPHOCYTES # BLD AUTO: 2.41 K/UL — SIGNIFICANT CHANGE UP (ref 1.2–3.4)
LYMPHOCYTES # BLD AUTO: 2.41 K/UL — SIGNIFICANT CHANGE UP (ref 1.2–3.4)
MAGNESIUM SERPL-MCNC: 1.8 MG/DL — SIGNIFICANT CHANGE UP (ref 1.8–2.4)
MCHC RBC-ENTMCNC: 27.6 PG — SIGNIFICANT CHANGE UP (ref 27–31)
MCHC RBC-ENTMCNC: 27.6 PG — SIGNIFICANT CHANGE UP (ref 27–31)
MCHC RBC-ENTMCNC: 27.7 PG — SIGNIFICANT CHANGE UP (ref 27–31)
MCHC RBC-ENTMCNC: 27.7 PG — SIGNIFICANT CHANGE UP (ref 27–31)
MCHC RBC-ENTMCNC: 27.9 PG — SIGNIFICANT CHANGE UP (ref 27–31)
MCHC RBC-ENTMCNC: 27.9 PG — SIGNIFICANT CHANGE UP (ref 27–31)
MCHC RBC-ENTMCNC: 28.2 PG — SIGNIFICANT CHANGE UP (ref 27–31)
MCHC RBC-ENTMCNC: 28.2 PG — SIGNIFICANT CHANGE UP (ref 27–31)
MCHC RBC-ENTMCNC: 28.4 PG — SIGNIFICANT CHANGE UP (ref 27–31)
MCHC RBC-ENTMCNC: 28.4 PG — SIGNIFICANT CHANGE UP (ref 27–31)
MCHC RBC-ENTMCNC: 32.1 G/DL — SIGNIFICANT CHANGE UP (ref 32–37)
MCHC RBC-ENTMCNC: 32.1 G/DL — SIGNIFICANT CHANGE UP (ref 32–37)
MCHC RBC-ENTMCNC: 32.6 G/DL — SIGNIFICANT CHANGE UP (ref 32–37)
MCHC RBC-ENTMCNC: 33 G/DL — SIGNIFICANT CHANGE UP (ref 32–37)
MCV RBC AUTO: 85 FL — SIGNIFICANT CHANGE UP (ref 81–99)
MCV RBC AUTO: 85 FL — SIGNIFICANT CHANGE UP (ref 81–99)
MCV RBC AUTO: 85.3 FL — SIGNIFICANT CHANGE UP (ref 81–99)
MCV RBC AUTO: 85.3 FL — SIGNIFICANT CHANGE UP (ref 81–99)
MCV RBC AUTO: 85.6 FL — SIGNIFICANT CHANGE UP (ref 81–99)
MCV RBC AUTO: 85.6 FL — SIGNIFICANT CHANGE UP (ref 81–99)
MCV RBC AUTO: 85.8 FL — SIGNIFICANT CHANGE UP (ref 81–99)
MCV RBC AUTO: 85.8 FL — SIGNIFICANT CHANGE UP (ref 81–99)
MCV RBC AUTO: 86.1 FL — SIGNIFICANT CHANGE UP (ref 81–99)
MCV RBC AUTO: 86.1 FL — SIGNIFICANT CHANGE UP (ref 81–99)
MONOCYTES # BLD AUTO: 1.17 K/UL — HIGH (ref 0.1–0.6)
MONOCYTES # BLD AUTO: 1.17 K/UL — HIGH (ref 0.1–0.6)
MONOCYTES # BLD AUTO: 1.21 K/UL — HIGH (ref 0.1–0.6)
MONOCYTES # BLD AUTO: 1.21 K/UL — HIGH (ref 0.1–0.6)
MONOCYTES # BLD AUTO: 1.39 K/UL — HIGH (ref 0.1–0.6)
MONOCYTES # BLD AUTO: 1.39 K/UL — HIGH (ref 0.1–0.6)
MONOCYTES # BLD AUTO: 1.47 K/UL — HIGH (ref 0.1–0.6)
MONOCYTES # BLD AUTO: 1.47 K/UL — HIGH (ref 0.1–0.6)
MONOCYTES NFR BLD AUTO: 10 % — HIGH (ref 1.7–9.3)
MONOCYTES NFR BLD AUTO: 10 % — HIGH (ref 1.7–9.3)
MONOCYTES NFR BLD AUTO: 9 % — SIGNIFICANT CHANGE UP (ref 1.7–9.3)
MONOCYTES NFR BLD AUTO: 9 % — SIGNIFICANT CHANGE UP (ref 1.7–9.3)
MONOCYTES NFR BLD AUTO: 9.1 % — SIGNIFICANT CHANGE UP (ref 1.7–9.3)
MONOCYTES NFR BLD AUTO: 9.1 % — SIGNIFICANT CHANGE UP (ref 1.7–9.3)
MONOCYTES NFR BLD AUTO: 9.3 % — SIGNIFICANT CHANGE UP (ref 1.7–9.3)
MONOCYTES NFR BLD AUTO: 9.3 % — SIGNIFICANT CHANGE UP (ref 1.7–9.3)
NEUTROPHILS # BLD AUTO: 11.03 K/UL — HIGH (ref 1.4–6.5)
NEUTROPHILS # BLD AUTO: 11.03 K/UL — HIGH (ref 1.4–6.5)
NEUTROPHILS # BLD AUTO: 11.28 K/UL — HIGH (ref 1.4–6.5)
NEUTROPHILS # BLD AUTO: 11.28 K/UL — HIGH (ref 1.4–6.5)
NEUTROPHILS # BLD AUTO: 9.66 K/UL — HIGH (ref 1.4–6.5)
NEUTROPHILS # BLD AUTO: 9.66 K/UL — HIGH (ref 1.4–6.5)
NEUTROPHILS # BLD AUTO: 9.68 K/UL — HIGH (ref 1.4–6.5)
NEUTROPHILS # BLD AUTO: 9.68 K/UL — HIGH (ref 1.4–6.5)
NEUTROPHILS NFR BLD AUTO: 72.1 % — SIGNIFICANT CHANGE UP (ref 42.2–75.2)
NEUTROPHILS NFR BLD AUTO: 72.1 % — SIGNIFICANT CHANGE UP (ref 42.2–75.2)
NEUTROPHILS NFR BLD AUTO: 74.8 % — SIGNIFICANT CHANGE UP (ref 42.2–75.2)
NEUTROPHILS NFR BLD AUTO: 74.8 % — SIGNIFICANT CHANGE UP (ref 42.2–75.2)
NEUTROPHILS NFR BLD AUTO: 75.4 % — HIGH (ref 42.2–75.2)
NEUTROPHILS NFR BLD AUTO: 75.4 % — HIGH (ref 42.2–75.2)
NEUTROPHILS NFR BLD AUTO: 75.8 % — HIGH (ref 42.2–75.2)
NEUTROPHILS NFR BLD AUTO: 75.8 % — HIGH (ref 42.2–75.2)
NRBC # BLD: 0 /100 WBCS — SIGNIFICANT CHANGE UP (ref 0–0)
PCO2 BLDA: 29 MMHG — SIGNIFICANT CHANGE UP (ref 25–48)
PCO2 BLDA: 29 MMHG — SIGNIFICANT CHANGE UP (ref 25–48)
PH BLDA: 7.48 — HIGH (ref 7.35–7.45)
PH BLDA: 7.48 — HIGH (ref 7.35–7.45)
PLATELET # BLD AUTO: 130 K/UL — SIGNIFICANT CHANGE UP (ref 130–400)
PLATELET # BLD AUTO: 130 K/UL — SIGNIFICANT CHANGE UP (ref 130–400)
PLATELET # BLD AUTO: 144 K/UL — SIGNIFICANT CHANGE UP (ref 130–400)
PLATELET # BLD AUTO: 144 K/UL — SIGNIFICANT CHANGE UP (ref 130–400)
PLATELET # BLD AUTO: 153 K/UL — SIGNIFICANT CHANGE UP (ref 130–400)
PLATELET # BLD AUTO: 153 K/UL — SIGNIFICANT CHANGE UP (ref 130–400)
PLATELET # BLD AUTO: 156 K/UL — SIGNIFICANT CHANGE UP (ref 130–400)
PLATELET # BLD AUTO: 156 K/UL — SIGNIFICANT CHANGE UP (ref 130–400)
PLATELET # BLD AUTO: 169 K/UL — SIGNIFICANT CHANGE UP (ref 130–400)
PLATELET # BLD AUTO: 169 K/UL — SIGNIFICANT CHANGE UP (ref 130–400)
PMV BLD: 11.2 FL — HIGH (ref 7.4–10.4)
PMV BLD: 11.3 FL — HIGH (ref 7.4–10.4)
PMV BLD: 11.3 FL — HIGH (ref 7.4–10.4)
PMV BLD: 11.4 FL — HIGH (ref 7.4–10.4)
PMV BLD: 11.4 FL — HIGH (ref 7.4–10.4)
PMV BLD: 11.5 FL — HIGH (ref 7.4–10.4)
PMV BLD: 11.5 FL — HIGH (ref 7.4–10.4)
PO2 BLDA: 98 MMHG — SIGNIFICANT CHANGE UP (ref 83–108)
PO2 BLDA: 98 MMHG — SIGNIFICANT CHANGE UP (ref 83–108)
POTASSIUM SERPL-MCNC: 3.2 MMOL/L — LOW (ref 3.5–5)
POTASSIUM SERPL-MCNC: 3.2 MMOL/L — LOW (ref 3.5–5)
POTASSIUM SERPL-MCNC: 3.9 MMOL/L — SIGNIFICANT CHANGE UP (ref 3.5–5)
POTASSIUM SERPL-MCNC: 3.9 MMOL/L — SIGNIFICANT CHANGE UP (ref 3.5–5)
POTASSIUM SERPL-SCNC: 3.2 MMOL/L — LOW (ref 3.5–5)
POTASSIUM SERPL-SCNC: 3.2 MMOL/L — LOW (ref 3.5–5)
POTASSIUM SERPL-SCNC: 3.9 MMOL/L — SIGNIFICANT CHANGE UP (ref 3.5–5)
POTASSIUM SERPL-SCNC: 3.9 MMOL/L — SIGNIFICANT CHANGE UP (ref 3.5–5)
PROT SERPL-MCNC: 3.9 G/DL — LOW (ref 6–8)
PROT SERPL-MCNC: 3.9 G/DL — LOW (ref 6–8)
RBC # BLD: 3.23 M/UL — LOW (ref 4.2–5.4)
RBC # BLD: 3.23 M/UL — LOW (ref 4.2–5.4)
RBC # BLD: 3.26 M/UL — LOW (ref 4.2–5.4)
RBC # BLD: 3.26 M/UL — LOW (ref 4.2–5.4)
RBC # BLD: 3.38 M/UL — LOW (ref 4.2–5.4)
RBC # BLD: 3.38 M/UL — LOW (ref 4.2–5.4)
RBC # BLD: 3.39 M/UL — LOW (ref 4.2–5.4)
RBC # BLD: 3.39 M/UL — LOW (ref 4.2–5.4)
RBC # BLD: 3.41 M/UL — LOW (ref 4.2–5.4)
RBC # BLD: 3.41 M/UL — LOW (ref 4.2–5.4)
RBC # FLD: 15.7 % — HIGH (ref 11.5–14.5)
RBC # FLD: 15.7 % — HIGH (ref 11.5–14.5)
RBC # FLD: 15.8 % — HIGH (ref 11.5–14.5)
RBC # FLD: 15.8 % — HIGH (ref 11.5–14.5)
RBC # FLD: 15.9 % — HIGH (ref 11.5–14.5)
SAO2 % BLDA: 100 % — HIGH (ref 94–98)
SAO2 % BLDA: 100 % — HIGH (ref 94–98)
SODIUM SERPL-SCNC: 141 MMOL/L — SIGNIFICANT CHANGE UP (ref 135–146)
SODIUM SERPL-SCNC: 141 MMOL/L — SIGNIFICANT CHANGE UP (ref 135–146)
SODIUM SERPL-SCNC: 146 MMOL/L — SIGNIFICANT CHANGE UP (ref 135–146)
SODIUM SERPL-SCNC: 146 MMOL/L — SIGNIFICANT CHANGE UP (ref 135–146)
WBC # BLD: 12.91 K/UL — HIGH (ref 4.8–10.8)
WBC # BLD: 12.91 K/UL — HIGH (ref 4.8–10.8)
WBC # BLD: 13.42 K/UL — HIGH (ref 4.8–10.8)
WBC # BLD: 13.42 K/UL — HIGH (ref 4.8–10.8)
WBC # BLD: 13.53 K/UL — HIGH (ref 4.8–10.8)
WBC # BLD: 13.53 K/UL — HIGH (ref 4.8–10.8)
WBC # BLD: 14.63 K/UL — HIGH (ref 4.8–10.8)
WBC # BLD: 14.63 K/UL — HIGH (ref 4.8–10.8)
WBC # BLD: 14.89 K/UL — HIGH (ref 4.8–10.8)
WBC # BLD: 14.89 K/UL — HIGH (ref 4.8–10.8)
WBC # FLD AUTO: 12.91 K/UL — HIGH (ref 4.8–10.8)
WBC # FLD AUTO: 12.91 K/UL — HIGH (ref 4.8–10.8)
WBC # FLD AUTO: 13.42 K/UL — HIGH (ref 4.8–10.8)
WBC # FLD AUTO: 13.42 K/UL — HIGH (ref 4.8–10.8)
WBC # FLD AUTO: 13.53 K/UL — HIGH (ref 4.8–10.8)
WBC # FLD AUTO: 13.53 K/UL — HIGH (ref 4.8–10.8)
WBC # FLD AUTO: 14.63 K/UL — HIGH (ref 4.8–10.8)
WBC # FLD AUTO: 14.63 K/UL — HIGH (ref 4.8–10.8)
WBC # FLD AUTO: 14.89 K/UL — HIGH (ref 4.8–10.8)
WBC # FLD AUTO: 14.89 K/UL — HIGH (ref 4.8–10.8)

## 2023-10-27 PROCEDURE — 99233 SBSQ HOSP IP/OBS HIGH 50: CPT

## 2023-10-27 PROCEDURE — 99291 CRITICAL CARE FIRST HOUR: CPT

## 2023-10-27 PROCEDURE — 71045 X-RAY EXAM CHEST 1 VIEW: CPT | Mod: 26,77

## 2023-10-27 PROCEDURE — 71045 X-RAY EXAM CHEST 1 VIEW: CPT | Mod: 26

## 2023-10-27 RX ORDER — POTASSIUM CHLORIDE 20 MEQ
20 PACKET (EA) ORAL
Refills: 0 | Status: COMPLETED | OUTPATIENT
Start: 2023-10-27 | End: 2023-10-27

## 2023-10-27 RX ORDER — DEXMEDETOMIDINE HYDROCHLORIDE IN 0.9% SODIUM CHLORIDE 4 UG/ML
0.05 INJECTION INTRAVENOUS
Qty: 400 | Refills: 0 | Status: DISCONTINUED | OUTPATIENT
Start: 2023-10-27 | End: 2023-10-27

## 2023-10-27 RX ORDER — DEXMEDETOMIDINE HYDROCHLORIDE IN 0.9% SODIUM CHLORIDE 4 UG/ML
0.05 INJECTION INTRAVENOUS
Qty: 200 | Refills: 0 | Status: DISCONTINUED | OUTPATIENT
Start: 2023-10-27 | End: 2023-10-30

## 2023-10-27 RX ORDER — PROPOFOL 10 MG/ML
3 INJECTION, EMULSION INTRAVENOUS ONCE
Refills: 0 | Status: DISCONTINUED | OUTPATIENT
Start: 2023-10-27 | End: 2023-10-27

## 2023-10-27 RX ADMIN — CHLORHEXIDINE GLUCONATE 1 APPLICATION(S): 213 SOLUTION TOPICAL at 05:28

## 2023-10-27 RX ADMIN — SODIUM CHLORIDE 75 MILLILITER(S): 9 INJECTION, SOLUTION INTRAVENOUS at 20:00

## 2023-10-27 RX ADMIN — Medication 50 MILLIEQUIVALENT(S): at 10:55

## 2023-10-27 RX ADMIN — Medication 1: at 06:57

## 2023-10-27 RX ADMIN — PANTOPRAZOLE SODIUM 40 MILLIGRAM(S): 20 TABLET, DELAYED RELEASE ORAL at 05:25

## 2023-10-27 RX ADMIN — FENTANYL CITRATE 3.75 MICROGRAM(S)/KG/HR: 50 INJECTION INTRAVENOUS at 18:52

## 2023-10-27 RX ADMIN — CHLORHEXIDINE GLUCONATE 15 MILLILITER(S): 213 SOLUTION TOPICAL at 05:25

## 2023-10-27 RX ADMIN — SODIUM CHLORIDE 75 MILLILITER(S): 9 INJECTION, SOLUTION INTRAVENOUS at 05:24

## 2023-10-27 RX ADMIN — Medication 1: at 11:36

## 2023-10-27 RX ADMIN — Medication 50 MILLIEQUIVALENT(S): at 11:36

## 2023-10-27 RX ADMIN — PIPERACILLIN AND TAZOBACTAM 25 GRAM(S): 4; .5 INJECTION, POWDER, LYOPHILIZED, FOR SOLUTION INTRAVENOUS at 13:09

## 2023-10-27 RX ADMIN — Medication 1: at 17:40

## 2023-10-27 RX ADMIN — FENTANYL CITRATE 3.75 MICROGRAM(S)/KG/HR: 50 INJECTION INTRAVENOUS at 20:00

## 2023-10-27 RX ADMIN — DEXMEDETOMIDINE HYDROCHLORIDE IN 0.9% SODIUM CHLORIDE 0.89 MICROGRAM(S)/KG/HR: 4 INJECTION INTRAVENOUS at 22:00

## 2023-10-27 RX ADMIN — Medication 50 MILLIEQUIVALENT(S): at 08:46

## 2023-10-27 RX ADMIN — SENNA PLUS 2 TABLET(S): 8.6 TABLET ORAL at 22:02

## 2023-10-27 RX ADMIN — CHLORHEXIDINE GLUCONATE 15 MILLILITER(S): 213 SOLUTION TOPICAL at 17:22

## 2023-10-27 RX ADMIN — PIPERACILLIN AND TAZOBACTAM 25 GRAM(S): 4; .5 INJECTION, POWDER, LYOPHILIZED, FOR SOLUTION INTRAVENOUS at 22:03

## 2023-10-27 RX ADMIN — PANTOPRAZOLE SODIUM 40 MILLIGRAM(S): 20 TABLET, DELAYED RELEASE ORAL at 17:22

## 2023-10-27 RX ADMIN — DEXMEDETOMIDINE HYDROCHLORIDE IN 0.9% SODIUM CHLORIDE 0.89 MICROGRAM(S)/KG/HR: 4 INJECTION INTRAVENOUS at 03:00

## 2023-10-27 RX ADMIN — PIPERACILLIN AND TAZOBACTAM 25 GRAM(S): 4; .5 INJECTION, POWDER, LYOPHILIZED, FOR SOLUTION INTRAVENOUS at 05:25

## 2023-10-27 NOTE — PROGRESS NOTE ADULT - ASSESSMENT
Assessment and Plan  Case of an 88 year old female patient known to have DM, HTN, DL, CVA, and atrial fibrillation on Xarelto who was brought to the ED on 10/25 for evaluation of episodes of hematemesis, found to be hypotensive and tachycardic, admitted for further management. Stay complicated by recurrence of hematemesis requiring intubation for airway protection. We are consulted for concern of upper GI bleeding.      Hematemesis w/ Hemorrhagic shock secondary to upper GI bleed  Likely Secondary to Bleeding Gastric Dieulafoy Lesions Status Post 4 Endoclips 10/25  Acute on Chronic Anemia  * Baseline hemoglobin (prior to admission): Hb 14.2 in 2022  * Still Intubated since 10/25; Vitals 10/26: 128/58mmHg, HR 96 bpm, T 37.7 degrees; on stable pressor requirements  * Hemoglobin trend: Hb 12.1/11.1/10.9 on 10/25 s/p 1 unit pRBC -> 10/27 Hb 9.6  * Coags: INR 1.2 on 10/25 s/p Tranexamic acid. Last use of Xarelto (10/24)  * CIARRA not performed as patient was being intubated  * No current abdominal imaging; No prior EGD  * Family History: negative for GI malignancies  * Status post EGD 10/25: Normal mucosa in the whole esophagus. Erosive gastritis. Erythematous oozing lesions in the stomach body concerning for Dieulafoy lesions (endoclips x 4). A large amount of coffee ground material and blood was seen in the duodenal bulb and second part of duodenum. After irrigation, cleaning, and carefully examining under water, there was no evidence of active bleeding or ulcer. .    RECOMMENDATIONS  - Continue MICU monitoring  - Monitor CBC q12. Transfuse PRN to Keep Hb >8. Active type and screen, 2 x 18 gauge IVs  - Continue IV Protonix 40mg BID  - Can start OG tube feeds (if remains intubated) or clear liquid diet (if extubated and can tolerate)  - Monitor MAP and keep >65mmHg. Currently on levophed 0.5  - ENT evaluation to r/o posterior nasal bleed  - Can resume reversible anticoagulation with heparin or lovenox and monitor very closely for recurrent bleeding episodes (hematemesis, coffee ground emesis, melena or hematochezia). Will need to discuss risks and benefits of anticoagulation resumption using CHADSVASC and HASBLED scores with patient and family  - Please avoid any NSAIDs  - If active bleeding with hemodynamic instability please obtain CTA and inform GI STAT      Thank you for your consult.  - Please note that plan was communicated with medical team.   - Please reach GI on [phone] during weekdays till 5pm.  - Please call the GI service line after 5pm on Weekdays and anytime on Weekends: [phone].      Felipe Segal MD  PGY - 4 Gastroenterology Fellow   [facility]     Assessment and Plan  Case of an 88 year old female patient known to have DM, HTN, DL, CVA, and atrial fibrillation on Xarelto who was brought to the ED on 10/25 for evaluation of episodes of hematemesis, found to be hypotensive and tachycardic, admitted for further management. Stay complicated by recurrence of hematemesis requiring intubation for airway protection. We are consulted for concern of upper GI bleeding.      Hematemesis w/ Hemorrhagic shock secondary to upper GI bleed  Likely Secondary to Bleeding Gastric Dieulafoy Lesions Status Post 4 Endoclips 10/25  Acute on Chronic Anemia  * Baseline hemoglobin (prior to admission): Hb 14.2 in 2022  * Still Intubated since 10/25; Vitals 10/26: 128/58mmHg, HR 96 bpm, T 37.7 degrees; on stable pressor requirements  * Hemoglobin trend: Hb 12.1/11.1/10.9 on 10/25 s/p 1 unit pRBC -> 10/27 Hb 9.6  * Coags: INR 1.2 on 10/25 s/p Tranexamic acid. Last use of Xarelto (10/24)  * CIARRA not performed as patient was being intubated  * No current abdominal imaging; No prior EGD  * Family History: negative for GI malignancies  * Status post EGD 10/25: Normal mucosa in the whole esophagus. Erosive gastritis. Erythematous oozing lesions in the stomach body concerning for Dieulafoy lesions (endoclips x 4). A large amount of coffee ground material and blood was seen in the duodenal bulb and second part of duodenum. After irrigation, cleaning, and carefully examining under water, there was no evidence of active bleeding or ulcer. .    RECOMMENDATIONS  - Continue MICU monitoring  - Monitor CBC q12. Transfuse PRN to Keep Hb >8. Active type and screen, 2 x 18 gauge IVs  - Continue IV Protonix 40mg BID  - Can start OG tube feeds (if remains intubated) or clear liquid diet (if extubated and can tolerate)  - Monitor MAP and keep >65mmHg. Currently on levophed 0.5  - ENT evaluation to r/o posterior nasal bleed  - Can resume reversible anticoagulation with heparin or lovenox and monitor very closely for recurrent bleeding episodes (hematemesis, coffee ground emesis, melena or hematochezia). Will need to discuss risks and benefits of anticoagulation resumption using CHADSVASC and HASBLED scores with patient and family - consider EP eval for left atrial appendage closure eval  - Please avoid any NSAIDs  - If active bleeding with hemodynamic instability please obtain CTA and inform GI STAT      Thank you for your consult.  - Please note that plan was communicated with medical team.   - Please reach GI on [phone] during weekdays till 5pm.  - Please call the GI service line after 5pm on Weekdays and anytime on Weekends: [phone].      Felipe Segal MD  PGY - 4 Gastroenterology Fellow   [facility]

## 2023-10-27 NOTE — CHART NOTE - NSCHARTNOTEFT_GEN_A_CORE
GI NUTRITION SUPPORT TEAM  -  CONSULT NOTE     HPI:  88-year-old female with a past medical history significant for diabetes, CVA, atrial fibrillation on Xarelto, HTN,  who presents due to concern for upper GI bleed. Per patient she had epigastric burning sensation this morning when she woke up. This was associated with nausea and multiple episodes of vomiting. Per daughter, patient vomited a cupful of bright red blood with clots (three times).  Patient in route was hemodynamically stable however when presented to the ED patient was noted to be hypotensive and tachycardic.  Patient had a witnessed episode of bright red blood vomiting.  At that point patient became more lethargic.  Patient's daughter at bedside .  Patient and daughter both would like to be kept full code.  At that point made a decision with family aware to intubate patient due to concern for airway protection.  Patient given TXA ceftriaxone PPI bolus PPI drip in ED.  Patient intubated. Received fluids and started on pressors. GI following and plan to do EGD.   (25 Oct 2023 13:46)      GI NUTRITION SUPPORT NOTE:          REVIEW OF SYSTEMS:  Negative except as noted above.       PAST MEDICAL/SURGICAL HISTORY:   Diabetes  Hypertension  Atrial fibrillation  Cerebrovascular accident (CVA)    ALLERGIES:  No Known Allergies      VITALS:  T(F): 97.6 (10-27 @ 08:00), Max: 98.2 (10-27 @ 04:00)  HR: 88 (10-27 @ 08:30) (83 - 102)  BP: 101/57 (10-27 @ 08:30) (51/32 - 157/81)  RR: 20 (10-27 @ 08:30) (20 - 20)  SpO2: 100% (10-27 @ 08:30) (100% - 100%)      HEIGHT/WEIGHT/BMI:   Height (cm): 165.1 (10-25)  Weight (kg): 71 (10-25)  BMI (kg/m2): 26 (10-25)      PHYSICAL EXAM:   GENERAL:    HEENT:    ABDOMEN:    EXTREMITIES:     SKIN:    IV ACCESS:   ENTERAL ACCESS:     I/Os:     10-26-23 @ 07:01  -  10-27-23 @ 07:00  --------------------------------------------------------  IN:    Dexmedetomidine: 240.6 mL    FentaNYL: 150.1 mL    IV PiggyBack: 100 mL    Lactated Ringers: 75 mL    Lactated Ringers: 1650 mL    Norepinephrine: 266.5 mL    Pantoprazole: 80 mL    Propofol: 44.8 mL  Total IN: 2607 mL    OUT:    Indwelling Catheter - Urethral (mL): 740 mL  Total OUT: 740 mL    Total NET: 1867 mL      STANDING MEDICATIONS:   chlorhexidine 0.12% Liquid 15 milliLiter(s) Oral Mucosa every 12 hours  chlorhexidine 0.12% Liquid 15 milliLiter(s) Oral Mucosa two times a day  chlorhexidine 2% Cloths 1 Application(s) Topical <User Schedule>  dexMEDEtomidine Infusion 0.05 MICROgram(s)/kG/Hr IV Continuous <Continuous>  dextrose 5%. 1000 milliLiter(s) IV Continuous <Continuous>  dextrose 5%. 1000 milliLiter(s) IV Continuous <Continuous>  dextrose 50% Injectable 12.5 Gram(s) IV Push once  dextrose 50% Injectable 25 Gram(s) IV Push once  dextrose 50% Injectable 25 Gram(s) IV Push once  dextrose Oral Gel 15 Gram(s) Oral once PRN  fentaNYL   Infusion. 0.5 MICROgram(s)/kG/Hr IV Continuous <Continuous>  glucagon  Injectable 1 milliGRAM(s) IntraMuscular once  insulin lispro (ADMELOG) corrective regimen sliding scale   SubCutaneous three times a day before meals  lactated ringers. 1000 milliLiter(s) IV Continuous <Continuous>  norepinephrine Infusion 0.05 MICROgram(s)/kG/Min IV Continuous <Continuous>  pantoprazole  Injectable 40 milliGRAM(s) IV Push every 12 hours  piperacillin/tazobactam IVPB.. 3.375 Gram(s) IV Intermittent every 8 hours  potassium chloride  20 mEq/100 mL IVPB 20 milliEquivalent(s) IV Intermittent every 2 hours  senna 2 Tablet(s) Oral at bedtime      LABS:                         9.4    12.91 )-----------( 156      ( 27 Oct 2023 06:02 )             28.8     146  |  117<H>  |  27<H>  ----------------------------<  157<H>          (10-27-23 @ 06:02)  3.2<L>   |  22  |  0.6<L>    Ca    6.6<L>          (10-27-23 @ 06:02)  Mg     1.8         (10-27-23 @ 09:00)    TPro  3.9<L>  /  Alb  2.2<L>  /  TBili  0.4  /  DBili  x   /  AST  23  /  ALT  20  /  AlkPhos  41       10-27-23 @ 06:02      A1c: 6.4 % (10-26-23 @ 05:05)    Blood Glucose (Past 24 hours):  190 mg/dL (10-27 @ 06:09)  204 mg/dL (10-26 @ 17:27)  193 mg/dL (10-26 @ 11:48)      DIET:         ASSESSMENT          PLAN GI NUTRITION SUPPORT TEAM  -  CONSULT NOTE     HPI:  88-year-old female with a past medical history significant for diabetes, CVA, atrial fibrillation on Xarelto, HTN,  who presents due to concern for upper GI bleed. Per patient she had epigastric burning sensation this morning when she woke up. This was associated with nausea and multiple episodes of vomiting. Per daughter, patient vomited a cupful of bright red blood with clots (three times).  Patient in route was hemodynamically stable however when presented to the ED patient was noted to be hypotensive and tachycardic.  Patient had a witnessed episode of bright red blood vomiting.  At that point patient became more lethargic.  Patient's daughter at bedside .  Patient and daughter both would like to be kept full code.  At that point made a decision with family aware to intubate patient due to concern for airway protection.  Patient given TXA ceftriaxone PPI bolus PPI drip in ED.  Patient intubated. Received fluids and started on pressors. GI following and plan to do EGD.   (25 Oct 2023 13:46)      GI NUTRITION SUPPORT NOTE:    s/p EGD 10/25, Normal mucosa in the whole esophagus. Erosive gastritis. Erythematous oozing lesions in the stomach body concerning for Dieulafoy lesions (endoclips x 4). A large amount of coffee ground material and blood was seen in the duodenal bulb and second part of duodenum. After irrigation, cleaning, and carefully examining under water, there was no evidence of active bleeding or ulcer.   No further hematemesis, remains NPO. Vented, SBT today. On low dose levophed  +BM 10/25      REVIEW OF SYSTEMS:  Negative except as noted above.       PAST MEDICAL/SURGICAL HISTORY:   Diabetes  Hypertension  Atrial fibrillation  Cerebrovascular accident (CVA)    ALLERGIES:  No Known Allergies      VITALS:  T(F): 97.6 (10-27 @ 08:00), Max: 98.2 (10-27 @ 04:00)  HR: 88 (10-27 @ 08:30) (83 - 102)  BP: 101/57 (10-27 @ 08:30) (51/32 - 157/81)  RR: 20 (10-27 @ 08:30) (20 - 20)  SpO2: 100% (10-27 @ 08:30) (100% - 100%)      HEIGHT/WEIGHT/BMI:   Height (cm): 165.1 (10-25)  Weight (kg): 71 (10-25)  BMI (kg/m2): 26 (10-25)      PHYSICAL EXAM:   GENERAL: Vented, awake. NAD  ABDOMEN: soft, nontender, nondistended  EXTREMITIES: no clubbing, cyanosis, edema  SKIN: warm and dry, no breakdown  IV ACCESS: Left IJ TLC     I/Os:     10-26-23 @ 07:01  -  10-27-23 @ 07:00  --------------------------------------------------------  IN:    Dexmedetomidine: 240.6 mL    FentaNYL: 150.1 mL    IV PiggyBack: 100 mL    Lactated Ringers: 75 mL    Lactated Ringers: 1650 mL    Norepinephrine: 266.5 mL    Pantoprazole: 80 mL    Propofol: 44.8 mL  Total IN: 2607 mL    OUT:    Indwelling Catheter - Urethral (mL): 740 mL  Total OUT: 740 mL    Total NET: 1867 mL      STANDING MEDICATIONS:   chlorhexidine 0.12% Liquid 15 milliLiter(s) Oral Mucosa every 12 hours  chlorhexidine 0.12% Liquid 15 milliLiter(s) Oral Mucosa two times a day  chlorhexidine 2% Cloths 1 Application(s) Topical <User Schedule>  dexMEDEtomidine Infusion 0.05 MICROgram(s)/kG/Hr IV Continuous <Continuous>  dextrose 5%. 1000 milliLiter(s) IV Continuous <Continuous>  dextrose 5%. 1000 milliLiter(s) IV Continuous <Continuous>  dextrose 50% Injectable 12.5 Gram(s) IV Push once  dextrose 50% Injectable 25 Gram(s) IV Push once  dextrose 50% Injectable 25 Gram(s) IV Push once  dextrose Oral Gel 15 Gram(s) Oral once PRN  fentaNYL   Infusion. 0.5 MICROgram(s)/kG/Hr IV Continuous <Continuous>  glucagon  Injectable 1 milliGRAM(s) IntraMuscular once  insulin lispro (ADMELOG) corrective regimen sliding scale   SubCutaneous three times a day before meals  lactated ringers. 1000 milliLiter(s) IV Continuous <Continuous>  norepinephrine Infusion 0.05 MICROgram(s)/kG/Min IV Continuous <Continuous>  pantoprazole  Injectable 40 milliGRAM(s) IV Push every 12 hours  piperacillin/tazobactam IVPB.. 3.375 Gram(s) IV Intermittent every 8 hours  potassium chloride  20 mEq/100 mL IVPB 20 milliEquivalent(s) IV Intermittent every 2 hours  senna 2 Tablet(s) Oral at bedtime      LABS:                         9.4    12.91 )-----------( 156      ( 27 Oct 2023 06:02 )             28.8     146  |  117<H>  |  27<H>  ----------------------------<  157<H>          (10-27-23 @ 06:02)  3.2<L>   |  22  |  0.6<L>    Ca    6.6<L>          (10-27-23 @ 06:02)  Mg     1.8         (10-27-23 @ 09:00)    TPro  3.9<L>  /  Alb  2.2<L>  /  TBili  0.4  /  DBili  x   /  AST  23  /  ALT  20  /  AlkPhos  41       10-27-23 @ 06:02    A1c: 6.4 % (10-26-23 @ 05:05)    Blood Glucose (Past 24 hours):  190 mg/dL (10-27 @ 06:09)  204 mg/dL (10-26 @ 17:27)  193 mg/dL (10-26 @ 11:48)      DIET:         ASSESSMENT  88 year old female patient known to have DM, HTN, DL, CVA, and atrial fibrillation on Xarelto who was brought to the ED on 10/25 for evaluation of episodes of hematemesis, found to be hypotensive and tachycardic, admitted for further management. Stay complicated by recurrence of hematemesis requiring intubation for airway protection.    - Hematemesis w/ Hemorrhagic shock secondary to upper GI bleed  - Likely Secondary to Bleeding Gastric Dieulafoy Lesions Status Post 4 Endoclips 10/25  - Acute on Chronic Anemia  - hypokalemia       PLAN  - add in phos to next blood draw, if extubation planned suggest keeping phos >3.5  - if remains vented would start TF's with Peptamen AF at 20ml/hr and increase as tolerated to goal of 50ml/hr for now  - if extubated plan to start PO diet  - glycemic control   - will follow GI NUTRITION SUPPORT TEAM  -  CONSULT NOTE     HPI:  88-year-old female with a past medical history significant for diabetes, CVA, atrial fibrillation on Xarelto, HTN,  who presents due to concern for upper GI bleed. Per patient she had epigastric burning sensation this morning when she woke up. This was associated with nausea and multiple episodes of vomiting. Per daughter, patient vomited a cupful of bright red blood with clots (three times).  Patient in route was hemodynamically stable however when presented to the ED patient was noted to be hypotensive and tachycardic.  Patient had a witnessed episode of bright red blood vomiting.  At that point patient became more lethargic.  Patient's daughter at bedside .  Patient and daughter both would like to be kept full code.  At that point made a decision with family aware to intubate patient due to concern for airway protection.  Patient given TXA ceftriaxone PPI bolus PPI drip in ED.  Patient intubated. Received fluids and started on pressors. GI following and plan to do EGD.   (25 Oct 2023 13:46)      GI NUTRITION SUPPORT NOTE:    s/p EGD 10/25, Normal mucosa in the whole esophagus. Erosive gastritis. Erythematous oozing lesions in the stomach body concerning for Dieulafoy lesions (endoclips x 4). A large amount of coffee ground material and blood was seen in the duodenal bulb and second part of duodenum. After irrigation, cleaning, and carefully examining under water, there was no evidence of active bleeding or ulcer.   No further hematemesis, remains NPO. Vented, SBT today. On low dose levophed  +BM 10/25      REVIEW OF SYSTEMS:  Negative except as noted above.       PAST MEDICAL/SURGICAL HISTORY:   Diabetes  Hypertension  Atrial fibrillation  Cerebrovascular accident (CVA)    ALLERGIES:  No Known Allergies      VITALS:  T(F): 97.6 (10-27 @ 08:00), Max: 98.2 (10-27 @ 04:00)  HR: 88 (10-27 @ 08:30) (83 - 102)  BP: 101/57 (10-27 @ 08:30) (51/32 - 157/81)  RR: 20 (10-27 @ 08:30) (20 - 20)  SpO2: 100% (10-27 @ 08:30) (100% - 100%)      HEIGHT/WEIGHT/BMI:   Height (cm): 165.1 (10-25)  Weight (kg): 71 (10-25)  BMI (kg/m2): 26 (10-25)      PHYSICAL EXAM:   GENERAL: Vented, awake. NAD  ABDOMEN: soft, nontender, nondistended  EXTREMITIES: no clubbing, cyanosis, edema  SKIN: warm and dry, no breakdown  IV ACCESS: Left IJ TLC     I/Os:     10-26-23 @ 07:01  -  10-27-23 @ 07:00  --------------------------------------------------------  IN:    Dexmedetomidine: 240.6 mL    FentaNYL: 150.1 mL    IV PiggyBack: 100 mL    Lactated Ringers: 75 mL    Lactated Ringers: 1650 mL    Norepinephrine: 266.5 mL    Pantoprazole: 80 mL    Propofol: 44.8 mL  Total IN: 2607 mL    OUT:    Indwelling Catheter - Urethral (mL): 740 mL  Total OUT: 740 mL    Total NET: 1867 mL      STANDING MEDICATIONS:   chlorhexidine 0.12% Liquid 15 milliLiter(s) Oral Mucosa every 12 hours  chlorhexidine 0.12% Liquid 15 milliLiter(s) Oral Mucosa two times a day  chlorhexidine 2% Cloths 1 Application(s) Topical <User Schedule>  dexMEDEtomidine Infusion 0.05 MICROgram(s)/kG/Hr IV Continuous <Continuous>  dextrose 5%. 1000 milliLiter(s) IV Continuous <Continuous>  dextrose 5%. 1000 milliLiter(s) IV Continuous <Continuous>  dextrose 50% Injectable 12.5 Gram(s) IV Push once  dextrose 50% Injectable 25 Gram(s) IV Push once  dextrose 50% Injectable 25 Gram(s) IV Push once  dextrose Oral Gel 15 Gram(s) Oral once PRN  fentaNYL   Infusion. 0.5 MICROgram(s)/kG/Hr IV Continuous <Continuous>  glucagon  Injectable 1 milliGRAM(s) IntraMuscular once  insulin lispro (ADMELOG) corrective regimen sliding scale   SubCutaneous three times a day before meals  lactated ringers. 1000 milliLiter(s) IV Continuous <Continuous>  norepinephrine Infusion 0.05 MICROgram(s)/kG/Min IV Continuous <Continuous>  pantoprazole  Injectable 40 milliGRAM(s) IV Push every 12 hours  piperacillin/tazobactam IVPB.. 3.375 Gram(s) IV Intermittent every 8 hours  potassium chloride  20 mEq/100 mL IVPB 20 milliEquivalent(s) IV Intermittent every 2 hours  senna 2 Tablet(s) Oral at bedtime      LABS:                         9.4    12.91 )-----------( 156      ( 27 Oct 2023 06:02 )             28.8     146  |  117<H>  |  27<H>  ----------------------------<  157<H>          (10-27-23 @ 06:02)  3.2<L>   |  22  |  0.6<L>    Ca    6.6<L>          (10-27-23 @ 06:02)  Mg     1.8         (10-27-23 @ 09:00)    TPro  3.9<L>  /  Alb  2.2<L>  /  TBili  0.4  /  DBili  x   /  AST  23  /  ALT  20  /  AlkPhos  41       10-27-23 @ 06:02    A1c: 6.4 % (10-26-23 @ 05:05)    Blood Glucose (Past 24 hours):  190 mg/dL (10-27 @ 06:09)  204 mg/dL (10-26 @ 17:27)  193 mg/dL (10-26 @ 11:48)      DIET:         ASSESSMENT  88 year old female patient known to have DM, HTN, DL, CVA, and atrial fibrillation on Xarelto who was brought to the ED on 10/25 for evaluation of episodes of hematemesis, found to be hypotensive and tachycardic, admitted for further management. Stay complicated by recurrence of hematemesis requiring intubation for airway protection.    - Hematemesis w/ Hemorrhagic shock secondary to upper GI bleed  - Likely Secondary to Bleeding Gastric Dieulafoy Lesions Status Post 4 Endoclips 10/25  - Acute on Chronic Anemia  - hypokalemia       PLAN  - add in phos to next blood draw, if extubation planned suggest keeping phos >3.5  - if remains vented would start TF's with Peptamen AF at 20ml/hr and increase as tolerated to goal of 50ml/hr for now  - if extubated plan to start PO diet  - glycemic control   - will follow    Patient seen and examined, during the GI-Nutrition-advanced endoscopy rounds, case discussed with the GI dietitian, GI fellow, GI PA during the rounds, plan communicated to the primary team, assessment, and plan as above

## 2023-10-27 NOTE — PROGRESS NOTE ADULT - ASSESSMENT
IMPRESSION: Patient is a 87 y/o F    PLAN:    CNS:   -no depressants.     HEENT:   -Oral care    PULMONARY:    -HOB @ 45 degrees    CARDIOVASCULAR:   -IVF  -Ionotrops/Levophed  -FU 2d echo  -Trend cardiac enzymes  -Aspirin, Statin  -BB    GI:   -GI prophylaxis.    -oral feeds    RENAL:    -Follow up lytes.  Correct as needed.     INFECTIOUS DISEASE:   -Follow up cultures: blood and urine.   -Vanc/Hanane adjust to GFR.   -CHG 4% daily bath    HEMATOLOGICAL:    -DVT prophylaxis.    ENDOCRINE:    -Follow up FS.  -insulin sliding scale if needed    MUSCULOSKELETAL:   -increase ambulation as tolerated    CCU monitoring   IMPRESSION: Patient is a 89 y/o F with diabetes, CVA, afib (on Xarelto), HTN, presented for witnessed bright red blood vomiting. She was intubated for airway protection and admitted to CCU under pulmonary service.    PLAN:    CNS:   - Sedated with fentanyl and Precedex  - Follows commands and responds with head nods during SAT    HEENT:   - Oral care    PULMONARY:    - HOB @ 45 degrees  - Mechanically ventilated, failed SBT x 2 on 10/27/23    CARDIOVASCULAR:   - Requiring norepinephrine, currently 0.05 mcg/kg/min  - Not overloaded on exam  - LR at 75cc/hr (while patient is NPO)    GI:   - Bedside EGD done on 10/25: normal esophagus, no bleeding. Stomach: large amount of blood clots. Three different areas of erythema concerning for dieulafoy lesions. Four endoclips were placed. Large amount of coffee ground secretions were seen in the duodenal bulb and second part of duodenum, no active bleeding or ulcer.  - Transfuse to keep Hb > 8, continue IV Protonix 40 BID, can start OG tube feeds, can resume anticoag with very close monitoring for bleeding.    RENAL:    - Follow up lytes.  Correct as needed.     INFECTIOUS DISEASE:   - BCx NGTD  - Currently on Zosyn    HEMATOLOGICAL:    - Holding anticoag till H/H stabilize    ENDOCRINE:    - Follow up FS.  - Insulin sliding scale if needed    MUSCULOSKELETAL:   - Currently not able to ambulate or go OOBTC due to sedation and mech vent.    CCU monitoring

## 2023-10-27 NOTE — PROGRESS NOTE ADULT - SUBJECTIVE AND OBJECTIVE BOX
24H events:    Patient is a 88y old Female who presents with a chief complaint of GIB (27 Oct 2023 13:56)    Primary diagnosis of Upper GI bleed    Day 0: patient was intubated for airway protection due to hematemesis. Patient admitted to CCU. Bedside EGD done.  Day 1: Negative LE Duplex    Today is hospital day 2d. This morning patient was seen and examined at bedside, resting comfortably in bed.    No acute or major events overnight.  The patient passed SAT but failed SBT x 2 today. Sedation was resumed.  No overt signs of bleeding, no BM overnight.    Code Status:  Full      PAST MEDICAL & SURGICAL HISTORY  Diabetes    Hypertension    Atrial fibrillation    Cerebrovascular accident (CVA)      SOCIAL HISTORY:  Social History:      ALLERGIES:  No Known Allergies    MEDICATIONS:  STANDING MEDICATIONS  chlorhexidine 0.12% Liquid 15 milliLiter(s) Oral Mucosa two times a day  chlorhexidine 0.12% Liquid 15 milliLiter(s) Oral Mucosa every 12 hours  chlorhexidine 2% Cloths 1 Application(s) Topical <User Schedule>  dexMEDEtomidine Infusion 0.05 MICROgram(s)/kG/Hr IV Continuous <Continuous>  dextrose 5%. 1000 milliLiter(s) IV Continuous <Continuous>  dextrose 5%. 1000 milliLiter(s) IV Continuous <Continuous>  dextrose 50% Injectable 12.5 Gram(s) IV Push once  dextrose 50% Injectable 25 Gram(s) IV Push once  dextrose 50% Injectable 25 Gram(s) IV Push once  fentaNYL   Infusion. 0.5 MICROgram(s)/kG/Hr IV Continuous <Continuous>  glucagon  Injectable 1 milliGRAM(s) IntraMuscular once  insulin lispro (ADMELOG) corrective regimen sliding scale   SubCutaneous three times a day before meals  lactated ringers. 1000 milliLiter(s) IV Continuous <Continuous>  norepinephrine Infusion 0.05 MICROgram(s)/kG/Min IV Continuous <Continuous>  pantoprazole  Injectable 40 milliGRAM(s) IV Push every 12 hours  piperacillin/tazobactam IVPB.. 3.375 Gram(s) IV Intermittent every 8 hours  senna 2 Tablet(s) Oral at bedtime    PRN MEDICATIONS  dextrose Oral Gel 15 Gram(s) Oral once PRN    VITALS:   T(F): 98.2  HR: 74  BP: 119/58  RR: 21  SpO2: 100%    PHYSICAL EXAM:  GENERAL:   (  ) NAD, lying in bed comfortably     (  ) obtunded     (  ) lethargic     (  ) somnolent   ( X ) Sedated    HEAD:   ( X ) Atraumatic     (  ) hematoma     (  ) laceration (specify location:       )     NECK:  (  ) Supple     (  ) neck stiffness     (  ) nuchal rigidity     ( X )  no JVD     (  ) JVD present ( -- cm)    HEART:  Rate -->     ( X ) normal rate     (  ) bradycardic     (  ) tachycardic  Rhythm -->     ( X ) regular     (  ) regularly irregular     (  ) irregularly irregular  Murmurs -->     ( X ) normal s1s2     (  ) systolic murmur     (  ) diastolic murmur     (  ) continuous murmur      (  ) S3 present     (  ) S4 present    LUNGS:   ( X ) Unlabored respirations     (  ) tachypnea  ( X ) B/L air entry     (  ) decreased breath sounds in:  (location     )    ( X ) no adventitious sound     (  ) crackles     (  ) wheezing      (  ) rhonchi      (specify location:       )  (  ) chest wall tenderness (specify location:       )    ( X ) Mechanically ventilated    ABDOMEN:   ( X ) Soft     (  ) tense   |   ( X ) nondistended     (  ) distended   |   (  ) +BS     (  ) hypoactive bowel sounds     (  ) hyperactive bowel sounds  ( X ) nontender     (  ) RUQ tenderness     (  ) RLQ tenderness     (  ) LLQ tenderness     (  ) epigastric tenderness     (  ) diffuse tenderness  (  ) Splenomegaly      (  ) Hepatomegaly      (  ) Jaundice     (  ) ecchymosis     EXTREMITIES:  ( X ) Normal     (  ) Rash     (  ) ecchymosis     (  ) varicose veins      (  ) pitting edema     (  ) non-pitting edema   (  ) ulceration     (  ) gangrene:     (location:     )    NERVOUS SYSTEM:    (  ) A&Ox3     (  ) confused     (  ) lethargic   ( X ) Sedated  CN II-XII:     (  ) Intact     (  ) deficits found     (Specify:     )   Upper extremities:     (  ) no sensorimotor deficits     (  ) weakness     (  ) loss of proprioception/vibration     (  ) loss of touch/temperature (specify:    )  Lower extremities:     (  ) no sensorimotor deficits     (  ) weakness     (  ) loss of proprioception/vibration     (  ) loss of touch/temperature (specify:    )    SKIN:   (  ) No rashes or lesions     (  ) maculopapular rash     (  ) pustules     (  ) vesicles     (  ) ulcer     (  ) ecchymosis     (specify location:     )    AMPAC score:    ( X ) Indwelling De Guzman Catheter:   Date insterted:  10/25/23  Reason (  ) Critical illness     (  ) urinary retention    ( X ) Accurate Ins/Outs Monitoring     (  ) CMO patient    ( X ) Central Line:   Date inserted:  10/25/23  Location: (  ) Right IJ     ( X ) Left IJ     (  ) Right Fem     (  ) Left Fem      LABS:                        9.6    14.63 )-----------( 153      ( 27 Oct 2023 11:07 )             29.1     10-27    146  |  117<H>  |  27<H>  ----------------------------<  157<H>  3.2<L>   |  22  |  0.6<L>    Ca    6.6<L>      27 Oct 2023 06:02  Mg     1.8     10-27    TPro  3.9<L>  /  Alb  2.2<L>  /  TBili  0.4  /  DBili  x   /  AST  23  /  ALT  20  /  AlkPhos  41  10-27    PT/INR - ( 26 Oct 2023 05:05 )   PT: 13.70 sec;   INR: 1.20 ratio         PTT - ( 26 Oct 2023 05:05 )  PTT:25.9 sec    ABG - ( 27 Oct 2023 04:50 )  pH, Arterial: 7.48  pH, Blood: x     /  pCO2: 29    /  pO2: 98    / HCO3: 22    / Base Excess: -0.8  /  SaO2: 100.0       Culture - Blood (collected 26 Oct 2023 00:00)  Source: .Blood Blood-Venous  Preliminary Report (27 Oct 2023 09:01):    No growth at 24 hours      CARDIAC MARKERS ( 26 Oct 2023 11:19 )  x     / <0.01 ng/mL / x     / x     / x      CARDIAC MARKERS ( 26 Oct 2023 05:05 )  x     / <0.01 ng/mL / x     / x     / x          RADIOLOGY:    < from: [facility] Duplex Lower Ext Vein Scan, Bilat (10.26.23 @ 14:49) >  No evidence of deep venous thrombosis or superficial thrombophlebitis in   the bilateral lower extremities.

## 2023-10-27 NOTE — PROGRESS NOTE ADULT - SUBJECTIVE AND OBJECTIVE BOX
Patient is a 88y old  Female who presents with a chief complaint of GIB (26 Oct 2023 13:02)      Over Night Events:  Patient seen and examined.   still vented   h/h mild dropping   on levo 0.03 decreased     ROS:  See HPI    PHYSICAL EXAM    ICU Vital Signs Last 24 Hrs  T(C): 36.4 (27 Oct 2023 08:00), Max: 37.6 (26 Oct 2023 08:00)  T(F): 97.6 (27 Oct 2023 08:00), Max: 99.6 (26 Oct 2023 08:00)  HR: 87 (27 Oct 2023 07:00) (87 - 124)  BP: 116/71 (27 Oct 2023 07:00) (86/53 - 154/76)  BP(mean): 85 (27 Oct 2023 07:00) (63 - 108)  ABP: --  ABP(mean): --  RR: 20 (27 Oct 2023 07:00) (20 - 23)  SpO2: 100% (27 Oct 2023 07:00) (99% - 100%)    O2 Parameters below as of 27 Oct 2023 07:00  Patient On (Oxygen Delivery Method): ventilator    O2 Concentration (%): 40        General:awake   HEENT: cam[place]             Lymph Nodes: NO cervical LN   Lungs: Bilateral BS  Cardiovascular: Regular   Abdomen: Soft, Positive BS  Extremities: No clubbing   Skin: warm   Neurological: no focal   Musculoskeletal: move all ext     I&O's Detail    26 Oct 2023 07:01  -  27 Oct 2023 07:00  --------------------------------------------------------  IN:    Dexmedetomidine: 240.6 mL    FentaNYL: 150.1 mL    IV PiggyBack: 100 mL    Lactated Ringers: 75 mL    Lactated Ringers: 1650 mL    Norepinephrine: 264.5 mL    Pantoprazole: 80 mL    Propofol: 44.8 mL  Total IN: 2605 mL    OUT:    Indwelling Catheter - Urethral (mL): 740 mL  Total OUT: 740 mL    Total NET: 1865 mL          LABS:                          9.4    12.91 )-----------( 156      ( 27 Oct 2023 06:02 )             28.8         27 Oct 2023 06:02    146    |  117    |  27     ----------------------------<  157    3.2     |  22     |  0.6      Ca    6.6        27 Oct 2023 06:02    TPro  3.9    /  Alb  2.2    /  TBili  0.4    /  DBili  x      /  AST  23     /  ALT  20     /  AlkPhos  41     27 Oct 2023 06:02  Amylase x     lipase x                                                 PT/INR - ( 26 Oct 2023 05:05 )   PT: 13.70 sec;   INR: 1.20 ratio         PTT - ( 26 Oct 2023 05:05 )  PTT:25.9 sec                                       Urinalysis Basic - ( 27 Oct 2023 06:02 )    Color: x / Appearance: x / SG: x / pH: x  Gluc: 157 mg/dL / Ketone: x  / Bili: x / Urobili: x   Blood: x / Protein: x / Nitrite: x   Leuk Esterase: x / RBC: x / WBC x   Sq Epi: x / Non Sq Epi: x / Bacteria: x          CARDIAC MARKERS ( 26 Oct 2023 11:19 )  x     / <0.01 ng/mL / x     / x     / x      CARDIAC MARKERS ( 26 Oct 2023 05:05 )  x     / <0.01 ng/mL / x     / x     / x      CARDIAC MARKERS ( 25 Oct 2023 12:52 )  x     / <0.01 ng/mL / x     / x     / x                                                                                                         Mode: AC/ CMV (Assist Control/ Continuous Mandatory Ventilation)  RR (machine): 20  TV (machine): 400  FiO2: 40  PEEP: 8  MAP: 12  PIP: 22                                      ABG - ( 27 Oct 2023 04:50 )  pH, Arterial: 7.48  pH, Blood: x     /  pCO2: 29    /  pO2: 98    / HCO3: 22    / Base Excess: -0.8  /  SaO2: 100.0               MEDICATIONS  (STANDING):  chlorhexidine 0.12% Liquid 15 milliLiter(s) Oral Mucosa every 12 hours  chlorhexidine 0.12% Liquid 15 milliLiter(s) Oral Mucosa two times a day  chlorhexidine 2% Cloths 1 Application(s) Topical <User Schedule>  dexMEDEtomidine Infusion 0.05 MICROgram(s)/kG/Hr (0.89 mL/Hr) IV Continuous <Continuous>  dextrose 5%. 1000 milliLiter(s) (100 mL/Hr) IV Continuous <Continuous>  dextrose 5%. 1000 milliLiter(s) (50 mL/Hr) IV Continuous <Continuous>  dextrose 50% Injectable 12.5 Gram(s) IV Push once  dextrose 50% Injectable 25 Gram(s) IV Push once  dextrose 50% Injectable 25 Gram(s) IV Push once  fentaNYL   Infusion. 0.5 MICROgram(s)/kG/Hr (3.75 mL/Hr) IV Continuous <Continuous>  glucagon  Injectable 1 milliGRAM(s) IntraMuscular once  insulin lispro (ADMELOG) corrective regimen sliding scale   SubCutaneous three times a day before meals  lactated ringers. 1000 milliLiter(s) (75 mL/Hr) IV Continuous <Continuous>  norepinephrine Infusion 0.05 MICROgram(s)/kG/Min (3.52 mL/Hr) IV Continuous <Continuous>  pantoprazole  Injectable 40 milliGRAM(s) IV Push every 12 hours  piperacillin/tazobactam IVPB.. 3.375 Gram(s) IV Intermittent every 8 hours  senna 2 Tablet(s) Oral at bedtime    MEDICATIONS  (PRN):  dextrose Oral Gel 15 Gram(s) Oral once PRN Blood Glucose LESS THAN 70 milliGRAM(s)/deciliter          Xrays:  TLC:  OG:  ET tube:                                                                                    pending    ECHO:  CAM ICU:

## 2023-10-27 NOTE — PROGRESS NOTE ADULT - ASSESSMENT
IMPRESSION:    Acute hypoxemic respiratory failure   UGIB    A. fib on xarelto   HO CVA     PLAN:    CNS: Sedation with fentanyl and precedex  do SAT for SBT   HEENT: Oral care. ET care.     PULMONARY:  HOB @ 45 degrees. cxr now   if stable and no GI intervention proceed with SBT     CARDIOVASCULAR: Avoid overload. Check TTE. LR at 70 cc/hour.  while NPo     EKG Now     GI: PPI gtt. NPO for now. follow GI if no intervention proceed with SBT  h/h dropped     RENAL:  Follow up lytes.  Correct as needed. Monitor I/Os.     INFECTIOUS DISEASE: Panculture. Zosyn for now. Check MRSA nares.     HEMATOLOGICAL:  Hold xarelto. SP TXA. Serial CBC. Monitor coags. LE duplex.     ENDOCRINE:  Follow up FS.  Insulin protocol if needed.    MUSCULOSKELETAL: Bed rest.     Full code -    MICU monitoring.

## 2023-10-27 NOTE — PROGRESS NOTE ADULT - SUBJECTIVE AND OBJECTIVE BOX
Gastroenterology Follow Up Note      Location: [address] ([address])  Patient Name: JAMES WOODWARD  Age: 88y  Gender: Female      Chief Complaint  Patient is a 88y old Female who presents with a chief complaint of GIB (27 Oct 2023 07:52)  Primary diagnosis of Gastrointestinal hemorrhage      Reason for Consult  Hematemesis      Progress Note  This morning patient was seen and examined at bedside.    Today is hospital day 3d.  Patient continued to be intubated and mechanically ventilated.  She is on lower pressor requirements: Levophed 0.03 today.  Per nurse and team, patient has had no recurrence of hematemesis.  Last BM: brown stool on 10/25.   She is still NPO.      Vital Signs in the last 24 hours   Vitals Summary T(C): 36.8 (10-27-23 @ 12:00), Max: 37.4 (10-26-23 @ 16:00)  HR: 74 (10-27-23 @ 13:00) (74 - 114)  BP: 117/56 (10-27-23 @ 13:00) (51/32 - 157/81)  RR: 20 (10-27-23 @ 13:00) (20 - 21)  SpO2: 100% (10-27-23 @ 13:00) (99% - 100%)  Vent Data Device: Avea, Mode: AC/ CMV (Assist Control/ Continuous Mandatory Ventilation), RR (machine): 20, TV (machine): 400, FiO2: 40, PEEP: 8, ITime: 1, MAP: 12, PIP: 24  Intake/ Output   10-26-23 @ 07:01  -  10-27-23 @ 07:00  --------------------------------------------------------  IN: 2607 mL / OUT: 740 mL / NET: 1867 mL    10-27-23 @ 07:01  -  10-27-23 @ 14:00  --------------------------------------------------------  IN: 840 mL / OUT: 200 mL / NET: 640 mL        Physical Exam  * General Appearance: sedated, intubated, mechanically ventilated  * Lungs: Good bilateral air entry, intubated, mechanically ventilated  * Heart: Regular Rate and Rhythm, normal S1 and S2, no audible murmur, rub, or gallop  * Abdomen: Symmetric, non-distended, soft, non-tender, bowel sounds active all four quadrants, no masses      Investigations   Laboratory Workup      - CBC:                        9.6    14.63 )-----------( 153      ( 27 Oct 2023 11:07 )             29.1       - Hgb Trend:  9.6  10-27-23 @ 11:07  9.4  10-27-23 @ 06:02  9.5  10-27-23 @ 00:00  10.9  10-26-23 @ 11:19  11.8  10-26-23 @ 05:05  13.0  10-26-23 @ 00:00  11.1  10-25-23 @ 15:09  12.1  10-25-23 @ 11:13      10-25-23 @ 07:01  -  10-26-23 @ 07:00  --------------------------------------------------------  IN: 331 mL        - Chemistry:  10-27    146  |  117<H>  |  27<H>  ----------------------------<  157<H>  3.2<L>   |  22  |  0.6<L>    Ca    6.6<L>      27 Oct 2023 06:02  Mg     1.8     10-27    TPro  3.9<L>  /  Alb  2.2<L>  /  TBili  0.4  /  DBili  x   /  AST  23  /  ALT  20  /  AlkPhos  41  10-27    Liver panel trend:  TBili 0.4   /   AST 23   /   ALT 20   /   AlkP 41   /   Tptn 3.9   /   Alb 2.2    /   DBili --      10-27  TBili 0.5   /   AST 24   /   ALT 14   /   AlkP 49   /   Tptn 5.4   /   Alb 3.1    /   DBili --      10-26  TBili 0.4   /   AST 25   /   ALT 15   /   AlkP 51   /   Tptn 5.9   /   Alb 3.1    /   DBili --      10-26  TBili 0.3   /   AST 13   /   ALT <5   /   AlkP 52   /   Tptn 6.1   /   Alb 3.4    /   DBili --      10-25      - Coagulation Studies:  PT/INR - ( 26 Oct 2023 05:05 )   PT: 13.70 sec;   INR: 1.20 ratio         PTT - ( 26 Oct 2023 05:05 )  PTT:25.9 sec    - ABG:  ABG - ( 27 Oct 2023 04:50 )  pH, Arterial: 7.48  pH, Blood: x     /  pCO2: 29    /  pO2: 98    / HCO3: 22    / Base Excess: -0.8  /  SaO2: 100.0       - Cardiac Markers:  CARDIAC MARKERS ( 26 Oct 2023 11:19 )  x     / <0.01 ng/mL / x     / x     / x      CARDIAC MARKERS ( 26 Oct 2023 05:05 )  x     / <0.01 ng/mL / x     / x     / x            Microbiological Workup  Urinalysis Basic - ( 27 Oct 2023 06:02 )    Color: x / Appearance: x / SG: x / pH: x  Gluc: 157 mg/dL / Ketone: x  / Bili: x / Urobili: x   Blood: x / Protein: x / Nitrite: x   Leuk Esterase: x / RBC: x / WBC x   Sq Epi: x / Non Sq Epi: x / Bacteria: x      Culture - Blood (collected 26 Oct 2023 00:00)  Source: .Blood Blood-Venous  Preliminary Report (27 Oct 2023 09:01):    No growth at 24 hours        Current Medications  Standing Medications  chlorhexidine 0.12% Liquid 15 milliLiter(s) Oral Mucosa every 12 hours  chlorhexidine 0.12% Liquid 15 milliLiter(s) Oral Mucosa two times a day  chlorhexidine 2% Cloths 1 Application(s) Topical <User Schedule>  dexMEDEtomidine Infusion 0.05 MICROgram(s)/kG/Hr (0.89 mL/Hr) IV Continuous <Continuous>  dextrose 5%. 1000 milliLiter(s) (50 mL/Hr) IV Continuous <Continuous>  dextrose 5%. 1000 milliLiter(s) (100 mL/Hr) IV Continuous <Continuous>  dextrose 50% Injectable 12.5 Gram(s) IV Push once  dextrose 50% Injectable 25 Gram(s) IV Push once  dextrose 50% Injectable 25 Gram(s) IV Push once  fentaNYL   Infusion. 0.5 MICROgram(s)/kG/Hr (3.75 mL/Hr) IV Continuous <Continuous>  glucagon  Injectable 1 milliGRAM(s) IntraMuscular once  insulin lispro (ADMELOG) corrective regimen sliding scale   SubCutaneous three times a day before meals  lactated ringers. 1000 milliLiter(s) (75 mL/Hr) IV Continuous <Continuous>  norepinephrine Infusion 0.05 MICROgram(s)/kG/Min (3.52 mL/Hr) IV Continuous <Continuous>  pantoprazole  Injectable 40 milliGRAM(s) IV Push every 12 hours  piperacillin/tazobactam IVPB.. 3.375 Gram(s) IV Intermittent every 8 hours  senna 2 Tablet(s) Oral at bedtime    PRN Medications  dextrose Oral Gel 15 Gram(s) Oral once PRN Blood Glucose LESS THAN 70 milliGRAM(s)/deciliter    Singles Doses Administered  (ADM OVERRIDE) 1 each &lt;see task&gt; GiveOnce  (ADM OVERRIDE) 1 each &lt;see task&gt; GiveOnce  (ADM OVERRIDE) 1 each &lt;see task&gt; GiveOnce  (ADM OVERRIDE) 1 each &lt;see task&gt; GiveOnce  (ADM OVERRIDE) 1 each &lt;see task&gt; GiveOnce  (ADM OVERRIDE) 1 each &lt;see task&gt; GiveOnce  (ADM OVERRIDE) 2 each &lt;see task&gt; GiveOnce  (ADM OVERRIDE) 1 each &lt;see task&gt; GiveOnce  (ADM OVERRIDE) 1 each &lt;see task&gt; GiveOnce  cefTRIAXone   IVPB 2000 milliGRAM(s) IV Intermittent once  erythromycin   IVPB 250 milliGRAM(s) IV Intermittent once  etomidate Injectable 20 milliGRAM(s) IV Push once  fentaNYL    Injectable 50 MICROGram(s) IV Push once  lactated ringers Bolus 1000 milliLiter(s) IV Bolus once  metoclopramide Injectable 10 milliGRAM(s) IV Push once  metoclopramide Injectable 10 milliGRAM(s) IV Push once  pantoprazole  Injectable 80 milliGRAM(s) IV Push Once  potassium chloride  20 mEq/100 mL IVPB 20 milliEquivalent(s) IV Intermittent every 2 hours  propofol Injectable 50 milliGRAM(s) IV Push once  rocuronium Injectable 10 milliGRAM(s) IV Push Once  sodium chloride 0.9% Bolus 1000 milliLiter(s) IV Bolus once  tranexamic acid IVPB 1000 milliGRAM(s) IV Intermittent Once

## 2023-10-28 LAB
ALBUMIN SERPL ELPH-MCNC: 2.3 G/DL — LOW (ref 3.5–5.2)
ALBUMIN SERPL ELPH-MCNC: 2.3 G/DL — LOW (ref 3.5–5.2)
ALP SERPL-CCNC: 39 U/L — SIGNIFICANT CHANGE UP (ref 30–115)
ALP SERPL-CCNC: 39 U/L — SIGNIFICANT CHANGE UP (ref 30–115)
ALT FLD-CCNC: 40 U/L — SIGNIFICANT CHANGE UP (ref 0–41)
ALT FLD-CCNC: 40 U/L — SIGNIFICANT CHANGE UP (ref 0–41)
ANION GAP SERPL CALC-SCNC: 9 MMOL/L — SIGNIFICANT CHANGE UP (ref 7–14)
ANION GAP SERPL CALC-SCNC: 9 MMOL/L — SIGNIFICANT CHANGE UP (ref 7–14)
AST SERPL-CCNC: 33 U/L — SIGNIFICANT CHANGE UP (ref 0–41)
AST SERPL-CCNC: 33 U/L — SIGNIFICANT CHANGE UP (ref 0–41)
BASOPHILS # BLD AUTO: 0.03 K/UL — SIGNIFICANT CHANGE UP (ref 0–0.2)
BASOPHILS NFR BLD AUTO: 0.2 % — SIGNIFICANT CHANGE UP (ref 0–1)
BASOPHILS NFR BLD AUTO: 0.2 % — SIGNIFICANT CHANGE UP (ref 0–1)
BASOPHILS NFR BLD AUTO: 0.3 % — SIGNIFICANT CHANGE UP (ref 0–1)
BASOPHILS NFR BLD AUTO: 0.3 % — SIGNIFICANT CHANGE UP (ref 0–1)
BILIRUB SERPL-MCNC: 0.4 MG/DL — SIGNIFICANT CHANGE UP (ref 0.2–1.2)
BILIRUB SERPL-MCNC: 0.4 MG/DL — SIGNIFICANT CHANGE UP (ref 0.2–1.2)
BUN SERPL-MCNC: 22 MG/DL — HIGH (ref 10–20)
BUN SERPL-MCNC: 22 MG/DL — HIGH (ref 10–20)
CALCIUM SERPL-MCNC: 6.9 MG/DL — LOW (ref 8.4–10.4)
CALCIUM SERPL-MCNC: 6.9 MG/DL — LOW (ref 8.4–10.4)
CHLORIDE SERPL-SCNC: 113 MMOL/L — HIGH (ref 98–110)
CHLORIDE SERPL-SCNC: 113 MMOL/L — HIGH (ref 98–110)
CO2 SERPL-SCNC: 20 MMOL/L — SIGNIFICANT CHANGE UP (ref 17–32)
CO2 SERPL-SCNC: 20 MMOL/L — SIGNIFICANT CHANGE UP (ref 17–32)
CREAT SERPL-MCNC: 0.6 MG/DL — LOW (ref 0.7–1.5)
CREAT SERPL-MCNC: 0.6 MG/DL — LOW (ref 0.7–1.5)
EGFR: 87 ML/MIN/1.73M2 — SIGNIFICANT CHANGE UP
EGFR: 87 ML/MIN/1.73M2 — SIGNIFICANT CHANGE UP
EOSINOPHIL # BLD AUTO: 0.08 K/UL — SIGNIFICANT CHANGE UP (ref 0–0.7)
EOSINOPHIL NFR BLD AUTO: 0.7 % — SIGNIFICANT CHANGE UP (ref 0–8)
EOSINOPHIL NFR BLD AUTO: 0.7 % — SIGNIFICANT CHANGE UP (ref 0–8)
EOSINOPHIL NFR BLD AUTO: 0.8 % — SIGNIFICANT CHANGE UP (ref 0–8)
EOSINOPHIL NFR BLD AUTO: 0.8 % — SIGNIFICANT CHANGE UP (ref 0–8)
GAS PNL BLDA: SIGNIFICANT CHANGE UP
GLUCOSE BLDC GLUCOMTR-MCNC: 162 MG/DL — HIGH (ref 70–99)
GLUCOSE BLDC GLUCOMTR-MCNC: 162 MG/DL — HIGH (ref 70–99)
GLUCOSE BLDC GLUCOMTR-MCNC: 240 MG/DL — HIGH (ref 70–99)
GLUCOSE BLDC GLUCOMTR-MCNC: 240 MG/DL — HIGH (ref 70–99)
GLUCOSE BLDC GLUCOMTR-MCNC: 270 MG/DL — HIGH (ref 70–99)
GLUCOSE BLDC GLUCOMTR-MCNC: 270 MG/DL — HIGH (ref 70–99)
GLUCOSE SERPL-MCNC: 279 MG/DL — HIGH (ref 70–99)
GLUCOSE SERPL-MCNC: 279 MG/DL — HIGH (ref 70–99)
HCT VFR BLD CALC: 27.2 % — LOW (ref 37–47)
HCT VFR BLD CALC: 27.2 % — LOW (ref 37–47)
HCT VFR BLD CALC: 28.8 % — LOW (ref 37–47)
HCT VFR BLD CALC: 28.8 % — LOW (ref 37–47)
HGB BLD-MCNC: 8.8 G/DL — LOW (ref 12–16)
HGB BLD-MCNC: 8.8 G/DL — LOW (ref 12–16)
HGB BLD-MCNC: 9.3 G/DL — LOW (ref 12–16)
HGB BLD-MCNC: 9.3 G/DL — LOW (ref 12–16)
IMM GRANULOCYTES NFR BLD AUTO: 0.3 % — SIGNIFICANT CHANGE UP (ref 0.1–0.3)
IMM GRANULOCYTES NFR BLD AUTO: 0.3 % — SIGNIFICANT CHANGE UP (ref 0.1–0.3)
IMM GRANULOCYTES NFR BLD AUTO: 0.5 % — HIGH (ref 0.1–0.3)
IMM GRANULOCYTES NFR BLD AUTO: 0.5 % — HIGH (ref 0.1–0.3)
LYMPHOCYTES # BLD AUTO: 1.44 K/UL — SIGNIFICANT CHANGE UP (ref 1.2–3.4)
LYMPHOCYTES # BLD AUTO: 1.44 K/UL — SIGNIFICANT CHANGE UP (ref 1.2–3.4)
LYMPHOCYTES # BLD AUTO: 1.49 K/UL — SIGNIFICANT CHANGE UP (ref 1.2–3.4)
LYMPHOCYTES # BLD AUTO: 1.49 K/UL — SIGNIFICANT CHANGE UP (ref 1.2–3.4)
LYMPHOCYTES # BLD AUTO: 12.3 % — LOW (ref 20.5–51.1)
LYMPHOCYTES # BLD AUTO: 12.3 % — LOW (ref 20.5–51.1)
LYMPHOCYTES # BLD AUTO: 14.6 % — LOW (ref 20.5–51.1)
LYMPHOCYTES # BLD AUTO: 14.6 % — LOW (ref 20.5–51.1)
MAGNESIUM SERPL-MCNC: 1.6 MG/DL — LOW (ref 1.8–2.4)
MAGNESIUM SERPL-MCNC: 1.6 MG/DL — LOW (ref 1.8–2.4)
MCHC RBC-ENTMCNC: 28 PG — SIGNIFICANT CHANGE UP (ref 27–31)
MCHC RBC-ENTMCNC: 28 PG — SIGNIFICANT CHANGE UP (ref 27–31)
MCHC RBC-ENTMCNC: 28.1 PG — SIGNIFICANT CHANGE UP (ref 27–31)
MCHC RBC-ENTMCNC: 28.1 PG — SIGNIFICANT CHANGE UP (ref 27–31)
MCHC RBC-ENTMCNC: 32.3 G/DL — SIGNIFICANT CHANGE UP (ref 32–37)
MCHC RBC-ENTMCNC: 32.3 G/DL — SIGNIFICANT CHANGE UP (ref 32–37)
MCHC RBC-ENTMCNC: 32.4 G/DL — SIGNIFICANT CHANGE UP (ref 32–37)
MCHC RBC-ENTMCNC: 32.4 G/DL — SIGNIFICANT CHANGE UP (ref 32–37)
MCV RBC AUTO: 86.7 FL — SIGNIFICANT CHANGE UP (ref 81–99)
MCV RBC AUTO: 86.7 FL — SIGNIFICANT CHANGE UP (ref 81–99)
MCV RBC AUTO: 86.9 FL — SIGNIFICANT CHANGE UP (ref 81–99)
MCV RBC AUTO: 86.9 FL — SIGNIFICANT CHANGE UP (ref 81–99)
MONOCYTES # BLD AUTO: 0.77 K/UL — HIGH (ref 0.1–0.6)
MONOCYTES # BLD AUTO: 0.77 K/UL — HIGH (ref 0.1–0.6)
MONOCYTES # BLD AUTO: 0.82 K/UL — HIGH (ref 0.1–0.6)
MONOCYTES # BLD AUTO: 0.82 K/UL — HIGH (ref 0.1–0.6)
MONOCYTES NFR BLD AUTO: 6.8 % — SIGNIFICANT CHANGE UP (ref 1.7–9.3)
MONOCYTES NFR BLD AUTO: 6.8 % — SIGNIFICANT CHANGE UP (ref 1.7–9.3)
MONOCYTES NFR BLD AUTO: 7.8 % — SIGNIFICANT CHANGE UP (ref 1.7–9.3)
MONOCYTES NFR BLD AUTO: 7.8 % — SIGNIFICANT CHANGE UP (ref 1.7–9.3)
NEUTROPHILS # BLD AUTO: 7.5 K/UL — HIGH (ref 1.4–6.5)
NEUTROPHILS # BLD AUTO: 7.5 K/UL — HIGH (ref 1.4–6.5)
NEUTROPHILS # BLD AUTO: 9.61 K/UL — HIGH (ref 1.4–6.5)
NEUTROPHILS # BLD AUTO: 9.61 K/UL — HIGH (ref 1.4–6.5)
NEUTROPHILS NFR BLD AUTO: 76 % — HIGH (ref 42.2–75.2)
NEUTROPHILS NFR BLD AUTO: 76 % — HIGH (ref 42.2–75.2)
NEUTROPHILS NFR BLD AUTO: 79.7 % — HIGH (ref 42.2–75.2)
NEUTROPHILS NFR BLD AUTO: 79.7 % — HIGH (ref 42.2–75.2)
NRBC # BLD: 0 /100 WBCS — SIGNIFICANT CHANGE UP (ref 0–0)
PHOSPHATE SERPL-MCNC: 2.2 MG/DL — SIGNIFICANT CHANGE UP (ref 2.1–4.9)
PHOSPHATE SERPL-MCNC: 2.2 MG/DL — SIGNIFICANT CHANGE UP (ref 2.1–4.9)
PLATELET # BLD AUTO: 129 K/UL — LOW (ref 130–400)
PLATELET # BLD AUTO: 129 K/UL — LOW (ref 130–400)
PLATELET # BLD AUTO: 148 K/UL — SIGNIFICANT CHANGE UP (ref 130–400)
PLATELET # BLD AUTO: 148 K/UL — SIGNIFICANT CHANGE UP (ref 130–400)
PMV BLD: 11.4 FL — HIGH (ref 7.4–10.4)
PMV BLD: 11.4 FL — HIGH (ref 7.4–10.4)
PMV BLD: 11.7 FL — HIGH (ref 7.4–10.4)
PMV BLD: 11.7 FL — HIGH (ref 7.4–10.4)
POTASSIUM SERPL-MCNC: 3.6 MMOL/L — SIGNIFICANT CHANGE UP (ref 3.5–5)
POTASSIUM SERPL-MCNC: 3.6 MMOL/L — SIGNIFICANT CHANGE UP (ref 3.5–5)
POTASSIUM SERPL-SCNC: 3.6 MMOL/L — SIGNIFICANT CHANGE UP (ref 3.5–5)
POTASSIUM SERPL-SCNC: 3.6 MMOL/L — SIGNIFICANT CHANGE UP (ref 3.5–5)
PROT SERPL-MCNC: 4 G/DL — LOW (ref 6–8)
PROT SERPL-MCNC: 4 G/DL — LOW (ref 6–8)
RBC # BLD: 3.13 M/UL — LOW (ref 4.2–5.4)
RBC # BLD: 3.13 M/UL — LOW (ref 4.2–5.4)
RBC # BLD: 3.32 M/UL — LOW (ref 4.2–5.4)
RBC # FLD: 15.6 % — HIGH (ref 11.5–14.5)
RETICS #: 51.5 K/UL — SIGNIFICANT CHANGE UP (ref 25–125)
RETICS #: 51.5 K/UL — SIGNIFICANT CHANGE UP (ref 25–125)
RETICS/RBC NFR: 1.6 % — HIGH (ref 0.5–1.5)
RETICS/RBC NFR: 1.6 % — HIGH (ref 0.5–1.5)
SODIUM SERPL-SCNC: 142 MMOL/L — SIGNIFICANT CHANGE UP (ref 135–146)
SODIUM SERPL-SCNC: 142 MMOL/L — SIGNIFICANT CHANGE UP (ref 135–146)
WBC # BLD: 12.07 K/UL — HIGH (ref 4.8–10.8)
WBC # BLD: 12.07 K/UL — HIGH (ref 4.8–10.8)
WBC # BLD: 9.87 K/UL — SIGNIFICANT CHANGE UP (ref 4.8–10.8)
WBC # BLD: 9.87 K/UL — SIGNIFICANT CHANGE UP (ref 4.8–10.8)
WBC # FLD AUTO: 12.07 K/UL — HIGH (ref 4.8–10.8)
WBC # FLD AUTO: 12.07 K/UL — HIGH (ref 4.8–10.8)
WBC # FLD AUTO: 9.87 K/UL — SIGNIFICANT CHANGE UP (ref 4.8–10.8)
WBC # FLD AUTO: 9.87 K/UL — SIGNIFICANT CHANGE UP (ref 4.8–10.8)

## 2023-10-28 PROCEDURE — 99291 CRITICAL CARE FIRST HOUR: CPT

## 2023-10-28 PROCEDURE — 71045 X-RAY EXAM CHEST 1 VIEW: CPT | Mod: 26

## 2023-10-28 RX ORDER — FENTANYL CITRATE 50 UG/ML
50 INJECTION INTRAVENOUS ONCE
Refills: 0 | Status: DISCONTINUED | OUTPATIENT
Start: 2023-10-28 | End: 2023-10-28

## 2023-10-28 RX ORDER — QUETIAPINE FUMARATE 200 MG/1
50 TABLET, FILM COATED ORAL DAILY
Refills: 0 | Status: DISCONTINUED | OUTPATIENT
Start: 2023-10-28 | End: 2023-10-30

## 2023-10-28 RX ADMIN — FENTANYL CITRATE 3.75 MICROGRAM(S)/KG/HR: 50 INJECTION INTRAVENOUS at 06:54

## 2023-10-28 RX ADMIN — DEXMEDETOMIDINE HYDROCHLORIDE IN 0.9% SODIUM CHLORIDE 0.89 MICROGRAM(S)/KG/HR: 4 INJECTION INTRAVENOUS at 06:12

## 2023-10-28 RX ADMIN — CHLORHEXIDINE GLUCONATE 1 APPLICATION(S): 213 SOLUTION TOPICAL at 05:17

## 2023-10-28 RX ADMIN — CHLORHEXIDINE GLUCONATE 15 MILLILITER(S): 213 SOLUTION TOPICAL at 05:18

## 2023-10-28 RX ADMIN — FENTANYL CITRATE 50 MICROGRAM(S): 50 INJECTION INTRAVENOUS at 07:00

## 2023-10-28 RX ADMIN — PIPERACILLIN AND TAZOBACTAM 25 GRAM(S): 4; .5 INJECTION, POWDER, LYOPHILIZED, FOR SOLUTION INTRAVENOUS at 21:10

## 2023-10-28 RX ADMIN — Medication 1: at 16:25

## 2023-10-28 RX ADMIN — PIPERACILLIN AND TAZOBACTAM 25 GRAM(S): 4; .5 INJECTION, POWDER, LYOPHILIZED, FOR SOLUTION INTRAVENOUS at 05:18

## 2023-10-28 RX ADMIN — Medication 1 MILLIGRAM(S): at 19:07

## 2023-10-28 RX ADMIN — SENNA PLUS 2 TABLET(S): 8.6 TABLET ORAL at 21:10

## 2023-10-28 RX ADMIN — PANTOPRAZOLE SODIUM 40 MILLIGRAM(S): 20 TABLET, DELAYED RELEASE ORAL at 17:34

## 2023-10-28 RX ADMIN — PIPERACILLIN AND TAZOBACTAM 25 GRAM(S): 4; .5 INJECTION, POWDER, LYOPHILIZED, FOR SOLUTION INTRAVENOUS at 13:25

## 2023-10-28 RX ADMIN — PANTOPRAZOLE SODIUM 40 MILLIGRAM(S): 20 TABLET, DELAYED RELEASE ORAL at 05:17

## 2023-10-28 RX ADMIN — CHLORHEXIDINE GLUCONATE 15 MILLILITER(S): 213 SOLUTION TOPICAL at 17:34

## 2023-10-28 RX ADMIN — FENTANYL CITRATE 50 MICROGRAM(S): 50 INJECTION INTRAVENOUS at 06:12

## 2023-10-28 RX ADMIN — Medication 2: at 11:55

## 2023-10-28 RX ADMIN — Medication 3: at 06:59

## 2023-10-28 RX ADMIN — QUETIAPINE FUMARATE 50 MILLIGRAM(S): 200 TABLET, FILM COATED ORAL at 11:55

## 2023-10-28 NOTE — PROGRESS NOTE ADULT - ASSESSMENT
IMPRESSION:    Acute hypoxemic respiratory failure   UGIB    A. fib on xarelto   HO CVA     PLAN:    CNS: Sedation with fentanyl and precedex  SAT      HEENT: Oral care. ET care.     PULMONARY:  HOB @ 45 degrees.   proceed with SBT     CARDIOVASCULAR: Avoid overload. Check TTE.     EKG Now     GI: PPI gtt. NPO for now. follow h/h     RENAL:  Follow up lytes.  Correct as needed. Monitor I/Os.     INFECTIOUS DISEASE: Panculture. Zosyn for now. Check MRSA nares.     HEMATOLOGICAL:  Hold xarelto. SP TXA. Serial CBC. Monitor coags. LE duplex.     ENDOCRINE:  Follow up FS.  Insulin protocol if needed.    MUSCULOSKELETAL: Bed rest.     Full code -    MICU monitoring.

## 2023-10-28 NOTE — PROGRESS NOTE ADULT - SUBJECTIVE AND OBJECTIVE BOX
Patient is a 88y old  Female who presents with a chief complaint of GIB (27 Oct 2023 14:47)        HPI:    88-year-old female with a past medical history significant for diabetes, CVA, atrial fibrillation on Xarelto, HTN,  who presents due to concern for upper GI bleed. Per patient she had epigastric burning sensation this morning when she woke up. This was associated with nausea and multiple episodes of vomiting. Per daughter, patient vomited a cupful of bright red blood with clots (three times).  Patient in route was hemodynamically stable however when presented to the ED patient was noted to be hypotensive and tachycardic.  Patient had a witnessed episode of bright red blood vomiting.  At that point patient became more lethargic.  Patient's daughter at bedside .  Patient and daughter both would like to be kept full code.  At that point made a decision with family aware to intubate patient due to concern for airway protection.  Patient given TXA ceftriaxone PPI bolus PPI drip in ED.  Patient intubated. Received fluids and started on pressors. GI following and plan to do EGD.        (25 Oct 2023 13:46)      Pt evaluated on rounds.  I reviewed the radiology tests and hospital record.    I reviewed previous notes on this patient.    Interval Events: No overnight events.      CAM:    SAT:    SBT:      REVIEW OF SYSTEMS:   see HPI      OBJECTIVE:  ICU Vital Signs Last 24 Hrs  T(C): 36.7 (28 Oct 2023 12:00), Max: 36.8 (28 Oct 2023 00:00)  T(F): 98 (28 Oct 2023 12:00), Max: 98.2 (28 Oct 2023 00:00)  HR: 84 (28 Oct 2023 12:00) (59 - 150)  BP: 111/77 (28 Oct 2023 12:00) (73/48 - 181/86)  BP(mean): 89 (28 Oct 2023 12:00) (56 - 121)  ABP: --  ABP(mean): --  RR: 18 (28 Oct 2023 12:00) (16 - 27)  SpO2: 100% (28 Oct 2023 12:00) (100% - 100%)    O2 Parameters below as of 28 Oct 2023 12:00  Patient On (Oxygen Delivery Method): ventilator    O2 Concentration (%): 40      Mode: AC/ CMV (Assist Control/ Continuous Mandatory Ventilation), RR (machine): 16, TV (machine): 400, FiO2: 40, PEEP: 8, ITime: 1, MAP: 12, PIP: 25    10-27 @ 07:01  -  10-28 @ 07:00  --------------------------------------------------------  IN: 3501.2 mL / OUT: 890 mL / NET: 2611.2 mL    10-28 @ 07:01  -  10-28 @ 13:39  --------------------------------------------------------  IN: 617.7 mL / OUT: 145 mL / NET: 472.7 mL      CAPILLARY BLOOD GLUCOSE      POCT Blood Glucose.: 240 mg/dL (28 Oct 2023 11:52)        PHYSICAL EXAM:       · ENMT:   Airway patent,   Nasal mucosa clear.  Mouth with normal mucosa.   No thrush    · EYES:   Clear bilaterally,   pupils equal,   round and reactive to light.    · CARDIAC:   Normal rate,   regular rhythm.    Heart sounds S1, S2.   No murmurs, no rubs or gallops on auscultation  no edema        CAROTID:   normal systolic impulse  no bruits    · RESPIRATORY:   rales  normal chest expansion  no retractions or use of accessory muscles  percussion of chest demonstrates no hyperresonance or dullness    · GASTROINTESTINAL:  Abdomen soft,   non-tender,   + BS  liver/spleen not palpable    · MUSCULOSKELETAL:   no clubbing, cyanosis      · SKIN:   Skin normal color for race,   warm, dry   No evidence of rash.        · HEME LYMPH:   no splenomegaly.  No cervical  lymphadenopathy.  no inguinal lymphadenopathy    HOSPITAL MEDICATIONS:  MEDICATIONS  (STANDING):  chlorhexidine 0.12% Liquid 15 milliLiter(s) Oral Mucosa every 12 hours  chlorhexidine 2% Cloths 1 Application(s) Topical <User Schedule>  dexMEDEtomidine Infusion 0.05 MICROgram(s)/kG/Hr (0.89 mL/Hr) IV Continuous <Continuous>  dextrose 5%. 1000 milliLiter(s) (100 mL/Hr) IV Continuous <Continuous>  dextrose 5%. 1000 milliLiter(s) (50 mL/Hr) IV Continuous <Continuous>  dextrose 50% Injectable 12.5 Gram(s) IV Push once  dextrose 50% Injectable 25 Gram(s) IV Push once  dextrose 50% Injectable 25 Gram(s) IV Push once  fentaNYL   Infusion. 0.5 MICROgram(s)/kG/Hr (3.75 mL/Hr) IV Continuous <Continuous>  glucagon  Injectable 1 milliGRAM(s) IntraMuscular once  insulin lispro (ADMELOG) corrective regimen sliding scale   SubCutaneous three times a day before meals  lactated ringers. 1000 milliLiter(s) (75 mL/Hr) IV Continuous <Continuous>  norepinephrine Infusion 0.05 MICROgram(s)/kG/Min (3.52 mL/Hr) IV Continuous <Continuous>  pantoprazole  Injectable 40 milliGRAM(s) IV Push every 12 hours  piperacillin/tazobactam IVPB.. 3.375 Gram(s) IV Intermittent every 8 hours  QUEtiapine 50 milliGRAM(s) Oral daily  senna 2 Tablet(s) Oral at bedtime    MEDICATIONS  (PRN):  dextrose Oral Gel 15 Gram(s) Oral once PRN Blood Glucose LESS THAN 70 milliGRAM(s)/deciliter    lactated ringers.: Solution, 1000 milliLiter(s) infuse at 75 mL/Hr  lactated ringers Bolus:   1000 milliLiter(s), IV Bolus, once, infuse over 60 Minute(s), Stop After 1 Doses  lactated ringers.: Solution, 1000 milliLiter(s) infuse at 100 mL/Hr  sodium chloride 0.9% Bolus:   1000 milliLiter(s), IV Bolus, once, infuse over 60 Minute(s), Stop After 1 Doses  Provider's Contact #: [phone]      LABS:                        8.8    9.87  )-----------( 129      ( 28 Oct 2023 04:30 )             27.2     10-28    142  |  113<H>  |  22<H>  ----------------------------<  279<H>  3.6   |  20  |  0.6<L>    Ca    6.9<L>      28 Oct 2023 04:30  Phos  2.2     10-28  Mg     1.6     10-28    TPro  4.0<L>  /  Alb  2.3<L>  /  TBili  0.4  /  DBili  x   /  AST  33  /  ALT  40  /  AlkPhos  39  10-28      Urinalysis Basic - ( 28 Oct 2023 04:30 )    Color: x / Appearance: x / SG: x / pH: x  Gluc: 279 mg/dL / Ketone: x  / Bili: x / Urobili: x   Blood: x / Protein: x / Nitrite: x   Leuk Esterase: x / RBC: x / WBC x   Sq Epi: x / Non Sq Epi: x / Bacteria: x      Arterial Blood Gas:  10-28 @ 12:49  7.41/34/170/22/100.0/-2.6  ABG lactate: --  Arterial Blood Gas:  10-28 @ 03:47  7.46/27/180/19/99.4/-3.8  ABG lactate: --  Arterial Blood Gas:  10-27 @ 04:50  7.48/29/98/22/100.0/-0.8  ABG lactate: --      Mode: AC/ CMV (Assist Control/ Continuous Mandatory Ventilation), RR (machine): 16, TV (machine): 400, FiO2: 40, PEEP: 8, ITime: 1, MAP: 12, PIP: 25        Mode: AC/ CMV (Assist Control/ Continuous Mandatory Ventilation)  RR (machine): 16  TV (machine): 400  FiO2: 40  PEEP: 8  ITime: 1  MAP: 12  PIP: 25      ABG - ( 28 Oct 2023 12:49 )  pH, Arterial: 7.41  pH, Blood: x     /  pCO2: 34    /  pO2: 170   / HCO3: 22    / Base Excess: -2.6  /  SaO2: 100.0               RADIOLOGY: Today I personally interpreted the latest CXR and other pertinent films.

## 2023-10-28 NOTE — CHART NOTE - NSCHARTNOTEFT_GEN_A_CORE
Patient was tachycardic up to 155, /65. She appeared agitated and angry. She was maxed on Fentanyl and Precedex. Ordered Fentanyl 50 micrograms IV push.

## 2023-10-29 LAB
-  AMIKACIN: SIGNIFICANT CHANGE UP
-  AMOXICILLIN/CLAVULANIC ACID: SIGNIFICANT CHANGE UP
-  AMPICILLIN/SULBACTAM: SIGNIFICANT CHANGE UP
-  AMPICILLIN: SIGNIFICANT CHANGE UP
-  AZTREONAM: SIGNIFICANT CHANGE UP
-  CEFAZOLIN: SIGNIFICANT CHANGE UP
-  CEFEPIME: SIGNIFICANT CHANGE UP
-  CEFOXITIN: SIGNIFICANT CHANGE UP
-  CEFTRIAXONE: SIGNIFICANT CHANGE UP
-  CEFUROXIME: SIGNIFICANT CHANGE UP
-  CIPROFLOXACIN: SIGNIFICANT CHANGE UP
-  ERTAPENEM: SIGNIFICANT CHANGE UP
-  GENTAMICIN: SIGNIFICANT CHANGE UP
-  IMIPENEM: SIGNIFICANT CHANGE UP
-  IMIPENEM: SIGNIFICANT CHANGE UP
-  LEVOFLOXACIN: SIGNIFICANT CHANGE UP
-  MEROPENEM: SIGNIFICANT CHANGE UP
-  NITROFURANTOIN: SIGNIFICANT CHANGE UP
-  PIPERACILLIN/TAZOBACTAM: SIGNIFICANT CHANGE UP
-  TOBRAMYCIN: SIGNIFICANT CHANGE UP
-  TRIMETHOPRIM/SULFAMETHOXAZOLE: SIGNIFICANT CHANGE UP
ALBUMIN SERPL ELPH-MCNC: 2.4 G/DL — LOW (ref 3.5–5.2)
ALBUMIN SERPL ELPH-MCNC: 2.4 G/DL — LOW (ref 3.5–5.2)
ALP SERPL-CCNC: 48 U/L — SIGNIFICANT CHANGE UP (ref 30–115)
ALP SERPL-CCNC: 48 U/L — SIGNIFICANT CHANGE UP (ref 30–115)
ALT FLD-CCNC: 40 U/L — SIGNIFICANT CHANGE UP (ref 0–41)
ALT FLD-CCNC: 40 U/L — SIGNIFICANT CHANGE UP (ref 0–41)
ANION GAP SERPL CALC-SCNC: 7 MMOL/L — SIGNIFICANT CHANGE UP (ref 7–14)
ANION GAP SERPL CALC-SCNC: 7 MMOL/L — SIGNIFICANT CHANGE UP (ref 7–14)
AST SERPL-CCNC: 21 U/L — SIGNIFICANT CHANGE UP (ref 0–41)
AST SERPL-CCNC: 21 U/L — SIGNIFICANT CHANGE UP (ref 0–41)
BASOPHILS # BLD AUTO: 0.03 K/UL — SIGNIFICANT CHANGE UP (ref 0–0.2)
BASOPHILS # BLD AUTO: 0.03 K/UL — SIGNIFICANT CHANGE UP (ref 0–0.2)
BASOPHILS NFR BLD AUTO: 0.3 % — SIGNIFICANT CHANGE UP (ref 0–1)
BASOPHILS NFR BLD AUTO: 0.3 % — SIGNIFICANT CHANGE UP (ref 0–1)
BILIRUB SERPL-MCNC: 0.2 MG/DL — SIGNIFICANT CHANGE UP (ref 0.2–1.2)
BILIRUB SERPL-MCNC: 0.2 MG/DL — SIGNIFICANT CHANGE UP (ref 0.2–1.2)
BUN SERPL-MCNC: 18 MG/DL — SIGNIFICANT CHANGE UP (ref 10–20)
BUN SERPL-MCNC: 18 MG/DL — SIGNIFICANT CHANGE UP (ref 10–20)
CALCIUM SERPL-MCNC: 7.8 MG/DL — LOW (ref 8.4–10.5)
CALCIUM SERPL-MCNC: 7.8 MG/DL — LOW (ref 8.4–10.5)
CHLORIDE SERPL-SCNC: 107 MMOL/L — SIGNIFICANT CHANGE UP (ref 98–110)
CHLORIDE SERPL-SCNC: 107 MMOL/L — SIGNIFICANT CHANGE UP (ref 98–110)
CO2 SERPL-SCNC: 25 MMOL/L — SIGNIFICANT CHANGE UP (ref 17–32)
CO2 SERPL-SCNC: 25 MMOL/L — SIGNIFICANT CHANGE UP (ref 17–32)
CREAT SERPL-MCNC: 0.5 MG/DL — LOW (ref 0.7–1.5)
CREAT SERPL-MCNC: 0.5 MG/DL — LOW (ref 0.7–1.5)
CULTURE RESULTS: ABNORMAL
CULTURE RESULTS: ABNORMAL
EGFR: 91 ML/MIN/1.73M2 — SIGNIFICANT CHANGE UP
EGFR: 91 ML/MIN/1.73M2 — SIGNIFICANT CHANGE UP
EOSINOPHIL # BLD AUTO: 0.2 K/UL — SIGNIFICANT CHANGE UP (ref 0–0.7)
EOSINOPHIL # BLD AUTO: 0.2 K/UL — SIGNIFICANT CHANGE UP (ref 0–0.7)
EOSINOPHIL NFR BLD AUTO: 1.8 % — SIGNIFICANT CHANGE UP (ref 0–8)
EOSINOPHIL NFR BLD AUTO: 1.8 % — SIGNIFICANT CHANGE UP (ref 0–8)
GAS PNL BLDA: SIGNIFICANT CHANGE UP
GLUCOSE BLDC GLUCOMTR-MCNC: 173 MG/DL — HIGH (ref 70–99)
GLUCOSE BLDC GLUCOMTR-MCNC: 173 MG/DL — HIGH (ref 70–99)
GLUCOSE BLDC GLUCOMTR-MCNC: 266 MG/DL — HIGH (ref 70–99)
GLUCOSE BLDC GLUCOMTR-MCNC: 266 MG/DL — HIGH (ref 70–99)
GLUCOSE SERPL-MCNC: 294 MG/DL — HIGH (ref 70–99)
GLUCOSE SERPL-MCNC: 294 MG/DL — HIGH (ref 70–99)
HCT VFR BLD CALC: 25.2 % — LOW (ref 37–47)
HCT VFR BLD CALC: 25.2 % — LOW (ref 37–47)
HCT VFR BLD CALC: 26.5 % — LOW (ref 37–47)
HCT VFR BLD CALC: 26.5 % — LOW (ref 37–47)
HGB BLD-MCNC: 8.2 G/DL — LOW (ref 12–16)
HGB BLD-MCNC: 8.2 G/DL — LOW (ref 12–16)
HGB BLD-MCNC: 8.5 G/DL — LOW (ref 12–16)
HGB BLD-MCNC: 8.5 G/DL — LOW (ref 12–16)
IMM GRANULOCYTES NFR BLD AUTO: 0.4 % — HIGH (ref 0.1–0.3)
IMM GRANULOCYTES NFR BLD AUTO: 0.4 % — HIGH (ref 0.1–0.3)
INR BLD: 1.23 RATIO — SIGNIFICANT CHANGE UP (ref 0.65–1.3)
INR BLD: 1.23 RATIO — SIGNIFICANT CHANGE UP (ref 0.65–1.3)
LYMPHOCYTES # BLD AUTO: 1.72 K/UL — SIGNIFICANT CHANGE UP (ref 1.2–3.4)
LYMPHOCYTES # BLD AUTO: 1.72 K/UL — SIGNIFICANT CHANGE UP (ref 1.2–3.4)
LYMPHOCYTES # BLD AUTO: 15.8 % — LOW (ref 20.5–51.1)
LYMPHOCYTES # BLD AUTO: 15.8 % — LOW (ref 20.5–51.1)
MAGNESIUM SERPL-MCNC: 1.8 MG/DL — SIGNIFICANT CHANGE UP (ref 1.8–2.4)
MAGNESIUM SERPL-MCNC: 1.8 MG/DL — SIGNIFICANT CHANGE UP (ref 1.8–2.4)
MCHC RBC-ENTMCNC: 27.9 PG — SIGNIFICANT CHANGE UP (ref 27–31)
MCHC RBC-ENTMCNC: 27.9 PG — SIGNIFICANT CHANGE UP (ref 27–31)
MCHC RBC-ENTMCNC: 28.2 PG — SIGNIFICANT CHANGE UP (ref 27–31)
MCHC RBC-ENTMCNC: 28.2 PG — SIGNIFICANT CHANGE UP (ref 27–31)
MCHC RBC-ENTMCNC: 32.1 G/DL — SIGNIFICANT CHANGE UP (ref 32–37)
MCHC RBC-ENTMCNC: 32.1 G/DL — SIGNIFICANT CHANGE UP (ref 32–37)
MCHC RBC-ENTMCNC: 32.5 G/DL — SIGNIFICANT CHANGE UP (ref 32–37)
MCHC RBC-ENTMCNC: 32.5 G/DL — SIGNIFICANT CHANGE UP (ref 32–37)
MCV RBC AUTO: 86.6 FL — SIGNIFICANT CHANGE UP (ref 81–99)
MCV RBC AUTO: 86.6 FL — SIGNIFICANT CHANGE UP (ref 81–99)
MCV RBC AUTO: 86.9 FL — SIGNIFICANT CHANGE UP (ref 81–99)
MCV RBC AUTO: 86.9 FL — SIGNIFICANT CHANGE UP (ref 81–99)
METHOD TYPE: SIGNIFICANT CHANGE UP
MONOCYTES # BLD AUTO: 0.9 K/UL — HIGH (ref 0.1–0.6)
MONOCYTES # BLD AUTO: 0.9 K/UL — HIGH (ref 0.1–0.6)
MONOCYTES NFR BLD AUTO: 8.3 % — SIGNIFICANT CHANGE UP (ref 1.7–9.3)
MONOCYTES NFR BLD AUTO: 8.3 % — SIGNIFICANT CHANGE UP (ref 1.7–9.3)
NEUTROPHILS # BLD AUTO: 7.98 K/UL — HIGH (ref 1.4–6.5)
NEUTROPHILS # BLD AUTO: 7.98 K/UL — HIGH (ref 1.4–6.5)
NEUTROPHILS NFR BLD AUTO: 73.4 % — SIGNIFICANT CHANGE UP (ref 42.2–75.2)
NEUTROPHILS NFR BLD AUTO: 73.4 % — SIGNIFICANT CHANGE UP (ref 42.2–75.2)
NRBC # BLD: 0 /100 WBCS — SIGNIFICANT CHANGE UP (ref 0–0)
ORGANISM # SPEC MICROSCOPIC CNT: ABNORMAL
ORGANISM # SPEC MICROSCOPIC CNT: SIGNIFICANT CHANGE UP
ORGANISM # SPEC MICROSCOPIC CNT: SIGNIFICANT CHANGE UP
PLATELET # BLD AUTO: 144 K/UL — SIGNIFICANT CHANGE UP (ref 130–400)
PLATELET # BLD AUTO: 144 K/UL — SIGNIFICANT CHANGE UP (ref 130–400)
PLATELET # BLD AUTO: 156 K/UL — SIGNIFICANT CHANGE UP (ref 130–400)
PLATELET # BLD AUTO: 156 K/UL — SIGNIFICANT CHANGE UP (ref 130–400)
PMV BLD: 11 FL — HIGH (ref 7.4–10.4)
PMV BLD: 11 FL — HIGH (ref 7.4–10.4)
PMV BLD: 11.4 FL — HIGH (ref 7.4–10.4)
PMV BLD: 11.4 FL — HIGH (ref 7.4–10.4)
POTASSIUM SERPL-MCNC: 4 MMOL/L — SIGNIFICANT CHANGE UP (ref 3.5–5)
POTASSIUM SERPL-MCNC: 4 MMOL/L — SIGNIFICANT CHANGE UP (ref 3.5–5)
POTASSIUM SERPL-SCNC: 4 MMOL/L — SIGNIFICANT CHANGE UP (ref 3.5–5)
POTASSIUM SERPL-SCNC: 4 MMOL/L — SIGNIFICANT CHANGE UP (ref 3.5–5)
PROT SERPL-MCNC: 4.6 G/DL — LOW (ref 6–8)
PROT SERPL-MCNC: 4.6 G/DL — LOW (ref 6–8)
PROTHROM AB SERPL-ACNC: 14.1 SEC — HIGH (ref 9.95–12.87)
PROTHROM AB SERPL-ACNC: 14.1 SEC — HIGH (ref 9.95–12.87)
RBC # BLD: 2.91 M/UL — LOW (ref 4.2–5.4)
RBC # BLD: 2.91 M/UL — LOW (ref 4.2–5.4)
RBC # BLD: 3.05 M/UL — LOW (ref 4.2–5.4)
RBC # BLD: 3.05 M/UL — LOW (ref 4.2–5.4)
RBC # FLD: 15.5 % — HIGH (ref 11.5–14.5)
RBC # FLD: 15.5 % — HIGH (ref 11.5–14.5)
RBC # FLD: 15.6 % — HIGH (ref 11.5–14.5)
RBC # FLD: 15.6 % — HIGH (ref 11.5–14.5)
SODIUM SERPL-SCNC: 139 MMOL/L — SIGNIFICANT CHANGE UP (ref 135–146)
SODIUM SERPL-SCNC: 139 MMOL/L — SIGNIFICANT CHANGE UP (ref 135–146)
SPECIMEN SOURCE: SIGNIFICANT CHANGE UP
SPECIMEN SOURCE: SIGNIFICANT CHANGE UP
WBC # BLD: 10.87 K/UL — HIGH (ref 4.8–10.8)
WBC # BLD: 10.87 K/UL — HIGH (ref 4.8–10.8)
WBC # BLD: 8.79 K/UL — SIGNIFICANT CHANGE UP (ref 4.8–10.8)
WBC # BLD: 8.79 K/UL — SIGNIFICANT CHANGE UP (ref 4.8–10.8)
WBC # FLD AUTO: 10.87 K/UL — HIGH (ref 4.8–10.8)
WBC # FLD AUTO: 10.87 K/UL — HIGH (ref 4.8–10.8)
WBC # FLD AUTO: 8.79 K/UL — SIGNIFICANT CHANGE UP (ref 4.8–10.8)
WBC # FLD AUTO: 8.79 K/UL — SIGNIFICANT CHANGE UP (ref 4.8–10.8)

## 2023-10-29 PROCEDURE — 71045 X-RAY EXAM CHEST 1 VIEW: CPT | Mod: 26,76

## 2023-10-29 PROCEDURE — 99291 CRITICAL CARE FIRST HOUR: CPT

## 2023-10-29 RX ORDER — ACETAMINOPHEN 500 MG
650 TABLET ORAL ONCE
Refills: 0 | Status: COMPLETED | OUTPATIENT
Start: 2023-10-29 | End: 2023-10-29

## 2023-10-29 RX ORDER — BENZOCAINE 10 %
1 GEL (GRAM) MUCOUS MEMBRANE THREE TIMES A DAY
Refills: 0 | Status: DISCONTINUED | OUTPATIENT
Start: 2023-10-29 | End: 2023-11-05

## 2023-10-29 RX ORDER — MIDODRINE HYDROCHLORIDE 2.5 MG/1
5 TABLET ORAL EVERY 8 HOURS
Refills: 0 | Status: DISCONTINUED | OUTPATIENT
Start: 2023-10-29 | End: 2023-10-30

## 2023-10-29 RX ADMIN — QUETIAPINE FUMARATE 50 MILLIGRAM(S): 200 TABLET, FILM COATED ORAL at 11:15

## 2023-10-29 RX ADMIN — Medication 1: at 10:59

## 2023-10-29 RX ADMIN — Medication 650 MILLIGRAM(S): at 20:30

## 2023-10-29 RX ADMIN — PIPERACILLIN AND TAZOBACTAM 25 GRAM(S): 4; .5 INJECTION, POWDER, LYOPHILIZED, FOR SOLUTION INTRAVENOUS at 05:20

## 2023-10-29 RX ADMIN — Medication 650 MILLIGRAM(S): at 19:45

## 2023-10-29 RX ADMIN — CHLORHEXIDINE GLUCONATE 1 APPLICATION(S): 213 SOLUTION TOPICAL at 05:19

## 2023-10-29 RX ADMIN — SENNA PLUS 2 TABLET(S): 8.6 TABLET ORAL at 21:18

## 2023-10-29 RX ADMIN — PANTOPRAZOLE SODIUM 40 MILLIGRAM(S): 20 TABLET, DELAYED RELEASE ORAL at 05:19

## 2023-10-29 RX ADMIN — PIPERACILLIN AND TAZOBACTAM 25 GRAM(S): 4; .5 INJECTION, POWDER, LYOPHILIZED, FOR SOLUTION INTRAVENOUS at 13:01

## 2023-10-29 RX ADMIN — PANTOPRAZOLE SODIUM 40 MILLIGRAM(S): 20 TABLET, DELAYED RELEASE ORAL at 17:01

## 2023-10-29 RX ADMIN — Medication 3: at 06:22

## 2023-10-29 RX ADMIN — PIPERACILLIN AND TAZOBACTAM 25 GRAM(S): 4; .5 INJECTION, POWDER, LYOPHILIZED, FOR SOLUTION INTRAVENOUS at 21:18

## 2023-10-29 RX ADMIN — MIDODRINE HYDROCHLORIDE 5 MILLIGRAM(S): 2.5 TABLET ORAL at 21:18

## 2023-10-29 RX ADMIN — CHLORHEXIDINE GLUCONATE 15 MILLILITER(S): 213 SOLUTION TOPICAL at 05:19

## 2023-10-29 NOTE — PROGRESS NOTE ADULT - SUBJECTIVE AND OBJECTIVE BOX
24H events:    Patient is a 88y old Female who presents with a chief complaint of GIB (28 Oct 2023 13:39)    Primary diagnosis of Upper GI bleed    Brief hospital course: Patient was admitted for UGIB, intubated for airway protection. Clips placed by GI during bedside endoscopy. Failed SBT.    Today is hospital day 4d. This morning patient was seen and examined at bedside, resting comfortably in bed.    No acute or major events overnight.  Sedated and mech vent.    Code Status:  Full      PAST MEDICAL & SURGICAL HISTORY  Diabetes    Hypertension    Atrial fibrillation    Cerebrovascular accident (CVA)      SOCIAL HISTORY:  Social History:      ALLERGIES:  No Known Allergies    MEDICATIONS:  STANDING MEDICATIONS  chlorhexidine 0.12% Liquid 15 milliLiter(s) Oral Mucosa every 12 hours  chlorhexidine 2% Cloths 1 Application(s) Topical <User Schedule>  dexMEDEtomidine Infusion 0.05 MICROgram(s)/kG/Hr IV Continuous <Continuous>  dextrose 5%. 1000 milliLiter(s) IV Continuous <Continuous>  dextrose 5%. 1000 milliLiter(s) IV Continuous <Continuous>  dextrose 50% Injectable 12.5 Gram(s) IV Push once  dextrose 50% Injectable 25 Gram(s) IV Push once  dextrose 50% Injectable 25 Gram(s) IV Push once  fentaNYL   Infusion. 0.5 MICROgram(s)/kG/Hr IV Continuous <Continuous>  glucagon  Injectable 1 milliGRAM(s) IntraMuscular once  insulin lispro (ADMELOG) corrective regimen sliding scale   SubCutaneous three times a day before meals  lactated ringers. 1000 milliLiter(s) IV Continuous <Continuous>  norepinephrine Infusion 0.05 MICROgram(s)/kG/Min IV Continuous <Continuous>  pantoprazole  Injectable 40 milliGRAM(s) IV Push every 12 hours  piperacillin/tazobactam IVPB.. 3.375 Gram(s) IV Intermittent every 8 hours  QUEtiapine 50 milliGRAM(s) Oral daily  senna 2 Tablet(s) Oral at bedtime    PRN MEDICATIONS  dextrose Oral Gel 15 Gram(s) Oral once PRN    VITALS:   T(F): 98  HR: 80  BP: 118/63  RR: 16  SpO2: 99%    PHYSICAL EXAM:  GENERAL:   (  ) NAD, lying in bed comfortably     (  ) obtunded     (  ) lethargic     (  ) somnolent   ( X ) Sedated    HEAD:   ( X ) Atraumatic     (  ) hematoma     (  ) laceration (specify location:       )     NECK:  (  ) Supple     (  ) neck stiffness     (  ) nuchal rigidity     ( X )  no JVD     (  ) JVD present ( -- cm)    HEART:  Rate -->     ( X ) normal rate     (  ) bradycardic     (  ) tachycardic  Rhythm -->     ( X ) regular     (  ) regularly irregular     (  ) irregularly irregular  Murmurs -->     ( X ) normal s1s2     (  ) systolic murmur     (  ) diastolic murmur     (  ) continuous murmur      (  ) S3 present     (  ) S4 present    LUNGS:   ( X ) Unlabored respirations     (  ) tachypnea  ( X ) B/L air entry     (  ) decreased breath sounds in:  (location     )    ( X ) no adventitious sound     (  ) crackles     (  ) wheezing      (  ) rhonchi      (specify location:       )  (  ) chest wall tenderness (specify location:       )    ( X ) Mechanically ventilated    ABDOMEN:   ( X ) Soft     (  ) tense   |   ( X ) nondistended     (  ) distended   |   (  ) +BS     (  ) hypoactive bowel sounds     (  ) hyperactive bowel sounds  ( X ) nontender     (  ) RUQ tenderness     (  ) RLQ tenderness     (  ) LLQ tenderness     (  ) epigastric tenderness     (  ) diffuse tenderness  (  ) Splenomegaly      (  ) Hepatomegaly      (  ) Jaundice     (  ) ecchymosis     EXTREMITIES:  ( X ) Normal     (  ) Rash     (  ) ecchymosis     (  ) varicose veins      (  ) pitting edema     (  ) non-pitting edema   (  ) ulceration     (  ) gangrene:     (location:     )    NERVOUS SYSTEM:    (  ) A&Ox3     (  ) confused     (  ) lethargic   ( X ) Sedated  CN II-XII:     (  ) Intact     (  ) deficits found     (Specify:     )   Upper extremities:     (  ) no sensorimotor deficits     (  ) weakness     (  ) loss of proprioception/vibration     (  ) loss of touch/temperature (specify:    )  Lower extremities:     (  ) no sensorimotor deficits     (  ) weakness     (  ) loss of proprioception/vibration     (  ) loss of touch/temperature (specify:    )    SKIN:   (  ) No rashes or lesions     (  ) maculopapular rash     (  ) pustules     (  ) vesicles     (  ) ulcer     (  ) ecchymosis     (specify location:     )    AMPAC score:    ( X ) Indwelling De Guzman Catheter:   Date insterted:  10/25/23  Reason (  ) Critical illness     (  ) urinary retention    ( X ) Accurate Ins/Outs Monitoring     (  ) CMO patient    ( X ) Central Line:   Date inserted:  10/25/23  Location: (  ) Right IJ     ( X ) Left IJ     (  ) Right Fem     (  ) Left Fem    LABS:                        9.3    12.07 )-----------( 148      ( 28 Oct 2023 16:21 )             28.8     10-28    142  |  113<H>  |  22<H>  ----------------------------<  279<H>  3.6   |  20  |  0.6<L>    Ca    6.9<L>      28 Oct 2023 04:30  Phos  2.2     10-28  Mg     1.6     10-28    TPro  4.0<L>  /  Alb  2.3<L>  /  TBili  0.4  /  DBili  x   /  AST  33  /  ALT  40  /  AlkPhos  39  10-28      Urinalysis Basic - ( 28 Oct 2023 04:30 )    Color: x / Appearance: x / SG: x / pH: x  Gluc: 279 mg/dL / Ketone: x  / Bili: x / Urobili: x   Blood: x / Protein: x / Nitrite: x   Leuk Esterase: x / RBC: x / WBC x   Sq Epi: x / Non Sq Epi: x / Bacteria: x      ABG - ( 28 Oct 2023 12:49 )  pH, Arterial: 7.41  pH, Blood: x     /  pCO2: 34    /  pO2: 170   / HCO3: 22    / Base Excess: -2.6  /  SaO2: 100.0       RADIOLOGY:    < from: Xray Chest 1 View- PORTABLE-Routine (10.28.23 @ 06:21) >  No radiographic evidence of acute cardiopulmonary disease.

## 2023-10-29 NOTE — PROGRESS NOTE ADULT - ASSESSMENT
IMPRESSION: Patient is a 87 y/o F with diabetes, CVA, afib (on Xarelto), HTN, presented for witnessed bright red blood vomiting. She was intubated for airway protection and admitted to CCU under pulmonary service.    PLAN:    CNS:   - Sedated with fentanyl and Precedex  - Follows commands and responds with head nods during SAT    HEENT:   - Oral care    PULMONARY:    - HOB @ 45 degrees  - Mechanically ventilated, failed SBT x 2 on 10/27/23    CARDIOVASCULAR:   - Requiring norepinephrine, currently 0.0676 mcg/kg/min  - Not overloaded on exam    GI:   - Bedside EGD done on 10/25: normal esophagus, no bleeding. Stomach: large amount of blood clots. Three different areas of erythema concerning for dieulafoy lesions. Four endoclips were placed. Large amount of coffee ground secretions were seen in the duodenal bulb and second part of duodenum, no active bleeding or ulcer.  - Transfuse to keep Hb > 8, continue IV Protonix 40 BID, can start OG tube feeds, can resume anticoag with very close monitoring for bleeding. Hb improved to 9.3 on 10/28 without further transfusion.    RENAL:    - Follow up lytes.  Correct as needed.     INFECTIOUS DISEASE:   - BCx NGTD, UCx >100,000 GNR prelim  - Currently on Zosyn    HEMATOLOGICAL:    - Holding anticoag till H/H stabilize, improved to 9.3 on 10/28    ENDOCRINE:    - Follow up FS.  - Insulin sliding scale if needed    MUSCULOSKELETAL:   - Currently not able to ambulate or go OOBTC due to sedation and mech vent.    CCU monitoring

## 2023-10-29 NOTE — PROGRESS NOTE ADULT - SUBJECTIVE AND OBJECTIVE BOX
Patient is a 88y old  Female who presents with a chief complaint of GIB (29 Oct 2023 04:07)        HPI:    88-year-old female with a past medical history significant for diabetes, CVA, atrial fibrillation on Xarelto, HTN,  who presents due to concern for upper GI bleed. Per patient she had epigastric burning sensation this morning when she woke up. This was associated with nausea and multiple episodes of vomiting. Per daughter, patient vomited a cupful of bright red blood with clots (three times).  Patient in route was hemodynamically stable however when presented to the ED patient was noted to be hypotensive and tachycardic.  Patient had a witnessed episode of bright red blood vomiting.  At that point patient became more lethargic.  Patient's daughter at bedside .  Patient and daughter both would like to be kept full code.  At that point made a decision with family aware to intubate patient due to concern for airway protection.  Patient given TXA ceftriaxone PPI bolus PPI drip in ED.  Patient intubated. Received fluids and started on pressors. GI following and plan to do EGD.        (25 Oct 2023 13:46)      Pt evaluated on rounds.  I reviewed the radiology tests and hospital record.    I reviewed previous notes on this patient.    Interval Events: No overnight events.      CAM:    SAT:    SBT:      REVIEW OF SYSTEMS:   see HPI      OBJECTIVE:  ICU Vital Signs Last 24 Hrs  T(C): 36.1 (29 Oct 2023 08:00), Max: 36.7 (28 Oct 2023 16:00)  T(F): 97 (29 Oct 2023 08:00), Max: 98 (28 Oct 2023 16:00)  HR: 87 (29 Oct 2023 12:00) (70 - 124)  BP: 90/55 (29 Oct 2023 12:00) (74/55 - 165/82)  BP(mean): 69 (29 Oct 2023 12:00) (61 - 115)  ABP: --  ABP(mean): --  RR: 17 (29 Oct 2023 12:00) (15 - 22)  SpO2: 100% (29 Oct 2023 12:00) (96% - 100%)    O2 Parameters below as of 29 Oct 2023 12:00  Patient On (Oxygen Delivery Method): ventilator    O2 Concentration (%): 40      Mode: CPAP with PS, FiO2: 40, PEEP: 8, PS: 6, MAP: 11, PIP: 15    10-28 @ 07:01  -  10-29 @ 07:00  --------------------------------------------------------  IN: 3301.4 mL / OUT: 685 mL / NET: 2616.4 mL    10-29 @ 07:01  -  10-29 @ 12:43  --------------------------------------------------------  IN: 514.8 mL / OUT: 330 mL / NET: 184.8 mL      CAPILLARY BLOOD GLUCOSE      POCT Blood Glucose.: 173 mg/dL (29 Oct 2023 10:57)        PHYSICAL EXAM:       · ENMT:   Airway patent,   Nasal mucosa clear.  Mouth with normal mucosa.   No thrush    · EYES:   Clear bilaterally,   pupils equal,   round and reactive to light.    · CARDIAC:   Normal rate,   regular rhythm.    Heart sounds S1, S2.   No murmurs, no rubs or gallops on auscultation  no edema        CAROTID:   normal systolic impulse  no bruits    · RESPIRATORY:   rales  normal chest expansion  no retractions or use of accessory muscles  percussion of chest demonstrates no hyperresonance or dullness    · GASTROINTESTINAL:  Abdomen soft,   non-tender,   + BS  liver/spleen not palpable    · MUSCULOSKELETAL:   no clubbing, cyanosis      · SKIN:   Skin normal color for race,   warm, dry   No evidence of rash.        · HEME LYMPH:   no splenomegaly.  No cervical  lymphadenopathy.  no inguinal lymphadenopathy    HOSPITAL MEDICATIONS:  MEDICATIONS  (STANDING):  chlorhexidine 0.12% Liquid 15 milliLiter(s) Oral Mucosa every 12 hours  chlorhexidine 2% Cloths 1 Application(s) Topical <User Schedule>  dexMEDEtomidine Infusion 0.05 MICROgram(s)/kG/Hr (0.89 mL/Hr) IV Continuous <Continuous>  dextrose 5%. 1000 milliLiter(s) (50 mL/Hr) IV Continuous <Continuous>  dextrose 5%. 1000 milliLiter(s) (100 mL/Hr) IV Continuous <Continuous>  dextrose 50% Injectable 25 Gram(s) IV Push once  dextrose 50% Injectable 12.5 Gram(s) IV Push once  dextrose 50% Injectable 25 Gram(s) IV Push once  fentaNYL   Infusion. 0.5 MICROgram(s)/kG/Hr (3.75 mL/Hr) IV Continuous <Continuous>  glucagon  Injectable 1 milliGRAM(s) IntraMuscular once  insulin lispro (ADMELOG) corrective regimen sliding scale   SubCutaneous three times a day before meals  lactated ringers. 1000 milliLiter(s) (75 mL/Hr) IV Continuous <Continuous>  norepinephrine Infusion 0.05 MICROgram(s)/kG/Min (3.52 mL/Hr) IV Continuous <Continuous>  pantoprazole  Injectable 40 milliGRAM(s) IV Push every 12 hours  piperacillin/tazobactam IVPB.. 3.375 Gram(s) IV Intermittent every 8 hours  QUEtiapine 50 milliGRAM(s) Oral daily  senna 2 Tablet(s) Oral at bedtime    MEDICATIONS  (PRN):  dextrose Oral Gel 15 Gram(s) Oral once PRN Blood Glucose LESS THAN 70 milliGRAM(s)/deciliter    lactated ringers.: Solution, 1000 milliLiter(s) infuse at 75 mL/Hr  lactated ringers Bolus:   1000 milliLiter(s), IV Bolus, once, infuse over 60 Minute(s), Stop After 1 Doses  lactated ringers.: Solution, 1000 milliLiter(s) infuse at 100 mL/Hr  sodium chloride 0.9% Bolus:   1000 milliLiter(s), IV Bolus, once, infuse over 60 Minute(s), Stop After 1 Doses  Provider's Contact #: [phone]      LABS:                        8.5    10.87 )-----------( 144      ( 29 Oct 2023 04:20 )             26.5     10-29    139  |  107  |  18  ----------------------------<  294<H>  4.0   |  25  |  0.5<L>    Ca    7.8<L>      29 Oct 2023 04:20  Phos  2.2     10-28  Mg     1.8     10-29    TPro  4.6<L>  /  Alb  2.4<L>  /  TBili  0.2  /  DBili  x   /  AST  21  /  ALT  40  /  AlkPhos  48  10-29      Urinalysis Basic - ( 29 Oct 2023 04:20 )    Color: x / Appearance: x / SG: x / pH: x  Gluc: 294 mg/dL / Ketone: x  / Bili: x / Urobili: x   Blood: x / Protein: x / Nitrite: x   Leuk Esterase: x / RBC: x / WBC x   Sq Epi: x / Non Sq Epi: x / Bacteria: x      Arterial Blood Gas:  10-29 @ 10:22  7.50/31/200/24/99.7/1.3  ABG lactate: --  Arterial Blood Gas:  10-29 @ 04:36  7.37/39/134/22/100.0/-2.5  ABG lactate: --  Arterial Blood Gas:  10-28 @ 12:49  7.41/34/170/22/100.0/-2.6  ABG lactate: --  Arterial Blood Gas:  10-28 @ 03:47  7.46/27/180/19/99.4/-3.8  ABG lactate: --      Mode: CPAP with PS, FiO2: 40, PEEP: 8, PS: 6, MAP: 11, PIP: 15        Mode: CPAP with PS  FiO2: 40  PEEP: 8  PS: 6  MAP: 11  PIP: 15      ABG - ( 29 Oct 2023 10:22 )  pH, Arterial: 7.50  pH, Blood: x     /  pCO2: 31    /  pO2: 200   / HCO3: 24    / Base Excess: 1.3   /  SaO2: 99.7                RADIOLOGY: Today I personally interpreted the latest CXR and other pertinent films.

## 2023-10-29 NOTE — PROGRESS NOTE ADULT - ASSESSMENT
IMPRESSION:    Acute hypoxemic respiratory failure   UGIB    A. fib on xarelto   element sepsis  HO CVA     PLAN:    CNS: Sedation with fentanyl and precedex  SAT      HEENT: Oral care. ET care.     PULMONARY:  HOB @ 45 degrees.   proceed with SBT as tolerated    CARDIOVASCULAR: Avoid overload. Check TTE.  [place]  midodrine     EKG Now     GI: PPI gtt. NPO for now.   follow h/h     RENAL:  Follow up lytes.  Correct as needed. Monitor I/Os.     INFECTIOUS DISEASE:   Panculture. Zosyn       HEMATOLOGICAL:  Hold xarelto. SP TXA.   Serial CBC. Monitor coags. LE duplex.     ENDOCRINE:  Follow up FS.  Insulin protocol if needed.    MUSCULOSKELETAL: Bed rest.     Full code -    MICU monitoring.

## 2023-10-30 LAB
ALBUMIN SERPL ELPH-MCNC: 2.6 G/DL — LOW (ref 3.5–5.2)
ALBUMIN SERPL ELPH-MCNC: 2.6 G/DL — LOW (ref 3.5–5.2)
ALP SERPL-CCNC: 47 U/L — SIGNIFICANT CHANGE UP (ref 30–115)
ALP SERPL-CCNC: 47 U/L — SIGNIFICANT CHANGE UP (ref 30–115)
ALT FLD-CCNC: 29 U/L — SIGNIFICANT CHANGE UP (ref 0–41)
ALT FLD-CCNC: 29 U/L — SIGNIFICANT CHANGE UP (ref 0–41)
ANION GAP SERPL CALC-SCNC: 9 MMOL/L — SIGNIFICANT CHANGE UP (ref 7–14)
ANION GAP SERPL CALC-SCNC: 9 MMOL/L — SIGNIFICANT CHANGE UP (ref 7–14)
APTT BLD: 25.3 SEC — LOW (ref 27–39.2)
APTT BLD: 25.3 SEC — LOW (ref 27–39.2)
AST SERPL-CCNC: 15 U/L — SIGNIFICANT CHANGE UP (ref 0–41)
AST SERPL-CCNC: 15 U/L — SIGNIFICANT CHANGE UP (ref 0–41)
BASOPHILS # BLD AUTO: 0.03 K/UL — SIGNIFICANT CHANGE UP (ref 0–0.2)
BASOPHILS # BLD AUTO: 0.03 K/UL — SIGNIFICANT CHANGE UP (ref 0–0.2)
BASOPHILS NFR BLD AUTO: 0.4 % — SIGNIFICANT CHANGE UP (ref 0–1)
BASOPHILS NFR BLD AUTO: 0.4 % — SIGNIFICANT CHANGE UP (ref 0–1)
BILIRUB SERPL-MCNC: 0.3 MG/DL — SIGNIFICANT CHANGE UP (ref 0.2–1.2)
BILIRUB SERPL-MCNC: 0.3 MG/DL — SIGNIFICANT CHANGE UP (ref 0.2–1.2)
BLD GP AB SCN SERPL QL: SIGNIFICANT CHANGE UP
BLD GP AB SCN SERPL QL: SIGNIFICANT CHANGE UP
BUN SERPL-MCNC: 12 MG/DL — SIGNIFICANT CHANGE UP (ref 10–20)
BUN SERPL-MCNC: 12 MG/DL — SIGNIFICANT CHANGE UP (ref 10–20)
CALCIUM SERPL-MCNC: 8.2 MG/DL — LOW (ref 8.4–10.5)
CALCIUM SERPL-MCNC: 8.2 MG/DL — LOW (ref 8.4–10.5)
CHLORIDE SERPL-SCNC: 111 MMOL/L — HIGH (ref 98–110)
CHLORIDE SERPL-SCNC: 111 MMOL/L — HIGH (ref 98–110)
CO2 SERPL-SCNC: 26 MMOL/L — SIGNIFICANT CHANGE UP (ref 17–32)
CO2 SERPL-SCNC: 26 MMOL/L — SIGNIFICANT CHANGE UP (ref 17–32)
CREAT SERPL-MCNC: 0.6 MG/DL — LOW (ref 0.7–1.5)
CREAT SERPL-MCNC: 0.6 MG/DL — LOW (ref 0.7–1.5)
EGFR: 87 ML/MIN/1.73M2 — SIGNIFICANT CHANGE UP
EGFR: 87 ML/MIN/1.73M2 — SIGNIFICANT CHANGE UP
EOSINOPHIL # BLD AUTO: 0.25 K/UL — SIGNIFICANT CHANGE UP (ref 0–0.7)
EOSINOPHIL # BLD AUTO: 0.25 K/UL — SIGNIFICANT CHANGE UP (ref 0–0.7)
EOSINOPHIL NFR BLD AUTO: 3.4 % — SIGNIFICANT CHANGE UP (ref 0–8)
EOSINOPHIL NFR BLD AUTO: 3.4 % — SIGNIFICANT CHANGE UP (ref 0–8)
GLUCOSE BLDC GLUCOMTR-MCNC: 140 MG/DL — HIGH (ref 70–99)
GLUCOSE BLDC GLUCOMTR-MCNC: 140 MG/DL — HIGH (ref 70–99)
GLUCOSE BLDC GLUCOMTR-MCNC: 149 MG/DL — HIGH (ref 70–99)
GLUCOSE BLDC GLUCOMTR-MCNC: 149 MG/DL — HIGH (ref 70–99)
GLUCOSE BLDC GLUCOMTR-MCNC: 188 MG/DL — HIGH (ref 70–99)
GLUCOSE BLDC GLUCOMTR-MCNC: 188 MG/DL — HIGH (ref 70–99)
GLUCOSE BLDC GLUCOMTR-MCNC: 93 MG/DL — SIGNIFICANT CHANGE UP (ref 70–99)
GLUCOSE BLDC GLUCOMTR-MCNC: 93 MG/DL — SIGNIFICANT CHANGE UP (ref 70–99)
GLUCOSE SERPL-MCNC: 192 MG/DL — HIGH (ref 70–99)
GLUCOSE SERPL-MCNC: 192 MG/DL — HIGH (ref 70–99)
HCT VFR BLD CALC: 25.9 % — LOW (ref 37–47)
HCT VFR BLD CALC: 25.9 % — LOW (ref 37–47)
HGB BLD-MCNC: 8.3 G/DL — LOW (ref 12–16)
HGB BLD-MCNC: 8.3 G/DL — LOW (ref 12–16)
IMM GRANULOCYTES NFR BLD AUTO: 0.3 % — SIGNIFICANT CHANGE UP (ref 0.1–0.3)
IMM GRANULOCYTES NFR BLD AUTO: 0.3 % — SIGNIFICANT CHANGE UP (ref 0.1–0.3)
LYMPHOCYTES # BLD AUTO: 1.67 K/UL — SIGNIFICANT CHANGE UP (ref 1.2–3.4)
LYMPHOCYTES # BLD AUTO: 1.67 K/UL — SIGNIFICANT CHANGE UP (ref 1.2–3.4)
LYMPHOCYTES # BLD AUTO: 22.5 % — SIGNIFICANT CHANGE UP (ref 20.5–51.1)
LYMPHOCYTES # BLD AUTO: 22.5 % — SIGNIFICANT CHANGE UP (ref 20.5–51.1)
MAGNESIUM SERPL-MCNC: 1.9 MG/DL — SIGNIFICANT CHANGE UP (ref 1.8–2.4)
MAGNESIUM SERPL-MCNC: 1.9 MG/DL — SIGNIFICANT CHANGE UP (ref 1.8–2.4)
MCHC RBC-ENTMCNC: 28.1 PG — SIGNIFICANT CHANGE UP (ref 27–31)
MCHC RBC-ENTMCNC: 28.1 PG — SIGNIFICANT CHANGE UP (ref 27–31)
MCHC RBC-ENTMCNC: 32 G/DL — SIGNIFICANT CHANGE UP (ref 32–37)
MCHC RBC-ENTMCNC: 32 G/DL — SIGNIFICANT CHANGE UP (ref 32–37)
MCV RBC AUTO: 87.8 FL — SIGNIFICANT CHANGE UP (ref 81–99)
MCV RBC AUTO: 87.8 FL — SIGNIFICANT CHANGE UP (ref 81–99)
MONOCYTES # BLD AUTO: 0.8 K/UL — HIGH (ref 0.1–0.6)
MONOCYTES # BLD AUTO: 0.8 K/UL — HIGH (ref 0.1–0.6)
MONOCYTES NFR BLD AUTO: 10.8 % — HIGH (ref 1.7–9.3)
MONOCYTES NFR BLD AUTO: 10.8 % — HIGH (ref 1.7–9.3)
NEUTROPHILS # BLD AUTO: 4.64 K/UL — SIGNIFICANT CHANGE UP (ref 1.4–6.5)
NEUTROPHILS # BLD AUTO: 4.64 K/UL — SIGNIFICANT CHANGE UP (ref 1.4–6.5)
NEUTROPHILS NFR BLD AUTO: 62.6 % — SIGNIFICANT CHANGE UP (ref 42.2–75.2)
NEUTROPHILS NFR BLD AUTO: 62.6 % — SIGNIFICANT CHANGE UP (ref 42.2–75.2)
NRBC # BLD: 0 /100 WBCS — SIGNIFICANT CHANGE UP (ref 0–0)
NRBC # BLD: 0 /100 WBCS — SIGNIFICANT CHANGE UP (ref 0–0)
PLATELET # BLD AUTO: 161 K/UL — SIGNIFICANT CHANGE UP (ref 130–400)
PLATELET # BLD AUTO: 161 K/UL — SIGNIFICANT CHANGE UP (ref 130–400)
PMV BLD: 10.7 FL — HIGH (ref 7.4–10.4)
PMV BLD: 10.7 FL — HIGH (ref 7.4–10.4)
POTASSIUM SERPL-MCNC: 3.7 MMOL/L — SIGNIFICANT CHANGE UP (ref 3.5–5)
POTASSIUM SERPL-MCNC: 3.7 MMOL/L — SIGNIFICANT CHANGE UP (ref 3.5–5)
POTASSIUM SERPL-SCNC: 3.7 MMOL/L — SIGNIFICANT CHANGE UP (ref 3.5–5)
POTASSIUM SERPL-SCNC: 3.7 MMOL/L — SIGNIFICANT CHANGE UP (ref 3.5–5)
PROT SERPL-MCNC: 4.8 G/DL — LOW (ref 6–8)
PROT SERPL-MCNC: 4.8 G/DL — LOW (ref 6–8)
RBC # BLD: 2.95 M/UL — LOW (ref 4.2–5.4)
RBC # BLD: 2.95 M/UL — LOW (ref 4.2–5.4)
RBC # FLD: 15.7 % — HIGH (ref 11.5–14.5)
RBC # FLD: 15.7 % — HIGH (ref 11.5–14.5)
SODIUM SERPL-SCNC: 146 MMOL/L — SIGNIFICANT CHANGE UP (ref 135–146)
SODIUM SERPL-SCNC: 146 MMOL/L — SIGNIFICANT CHANGE UP (ref 135–146)
WBC # BLD: 7.41 K/UL — SIGNIFICANT CHANGE UP (ref 4.8–10.8)
WBC # BLD: 7.41 K/UL — SIGNIFICANT CHANGE UP (ref 4.8–10.8)
WBC # FLD AUTO: 7.41 K/UL — SIGNIFICANT CHANGE UP (ref 4.8–10.8)
WBC # FLD AUTO: 7.41 K/UL — SIGNIFICANT CHANGE UP (ref 4.8–10.8)

## 2023-10-30 PROCEDURE — 99291 CRITICAL CARE FIRST HOUR: CPT

## 2023-10-30 PROCEDURE — 93971 EXTREMITY STUDY: CPT | Mod: 26,RT

## 2023-10-30 PROCEDURE — 71045 X-RAY EXAM CHEST 1 VIEW: CPT | Mod: 26

## 2023-10-30 RX ORDER — ENOXAPARIN SODIUM 100 MG/ML
70 INJECTION SUBCUTANEOUS EVERY 12 HOURS
Refills: 0 | Status: DISCONTINUED | OUTPATIENT
Start: 2023-10-30 | End: 2023-10-30

## 2023-10-30 RX ORDER — ACETAMINOPHEN 500 MG
650 TABLET ORAL EVERY 6 HOURS
Refills: 0 | Status: DISCONTINUED | OUTPATIENT
Start: 2023-10-30 | End: 2023-11-05

## 2023-10-30 RX ORDER — SODIUM CHLORIDE 9 MG/ML
500 INJECTION, SOLUTION INTRAVENOUS ONCE
Refills: 0 | Status: COMPLETED | OUTPATIENT
Start: 2023-10-30 | End: 2023-10-30

## 2023-10-30 RX ORDER — ACETAMINOPHEN 500 MG
650 TABLET ORAL ONCE
Refills: 0 | Status: COMPLETED | OUTPATIENT
Start: 2023-10-30 | End: 2023-10-30

## 2023-10-30 RX ORDER — METRONIDAZOLE 500 MG
500 TABLET ORAL EVERY 8 HOURS
Refills: 0 | Status: DISCONTINUED | OUTPATIENT
Start: 2023-10-30 | End: 2023-11-01

## 2023-10-30 RX ORDER — QUETIAPINE FUMARATE 200 MG/1
50 TABLET, FILM COATED ORAL AT BEDTIME
Refills: 0 | Status: DISCONTINUED | OUTPATIENT
Start: 2023-10-30 | End: 2023-11-05

## 2023-10-30 RX ORDER — CEFTRIAXONE 500 MG/1
1000 INJECTION, POWDER, FOR SOLUTION INTRAMUSCULAR; INTRAVENOUS EVERY 24 HOURS
Refills: 0 | Status: DISCONTINUED | OUTPATIENT
Start: 2023-10-30 | End: 2023-11-01

## 2023-10-30 RX ORDER — NOREPINEPHRINE BITARTRATE/D5W 8 MG/250ML
0.05 PLASTIC BAG, INJECTION (ML) INTRAVENOUS
Qty: 16 | Refills: 0 | Status: DISCONTINUED | OUTPATIENT
Start: 2023-10-30 | End: 2023-10-31

## 2023-10-30 RX ORDER — QUETIAPINE FUMARATE 200 MG/1
50 TABLET, FILM COATED ORAL ONCE
Refills: 0 | Status: COMPLETED | OUTPATIENT
Start: 2023-10-30 | End: 2023-10-30

## 2023-10-30 RX ORDER — MIDODRINE HYDROCHLORIDE 2.5 MG/1
10 TABLET ORAL EVERY 8 HOURS
Refills: 0 | Status: DISCONTINUED | OUTPATIENT
Start: 2023-10-30 | End: 2023-10-31

## 2023-10-30 RX ORDER — ENOXAPARIN SODIUM 100 MG/ML
40 INJECTION SUBCUTANEOUS EVERY 24 HOURS
Refills: 0 | Status: DISCONTINUED | OUTPATIENT
Start: 2023-10-30 | End: 2023-10-30

## 2023-10-30 RX ORDER — ENOXAPARIN SODIUM 100 MG/ML
40 INJECTION SUBCUTANEOUS EVERY 24 HOURS
Refills: 0 | Status: DISCONTINUED | OUTPATIENT
Start: 2023-10-30 | End: 2023-10-31

## 2023-10-30 RX ADMIN — Medication 650 MILLIGRAM(S): at 21:06

## 2023-10-30 RX ADMIN — PANTOPRAZOLE SODIUM 40 MILLIGRAM(S): 20 TABLET, DELAYED RELEASE ORAL at 05:06

## 2023-10-30 RX ADMIN — Medication 650 MILLIGRAM(S): at 18:49

## 2023-10-30 RX ADMIN — SENNA PLUS 2 TABLET(S): 8.6 TABLET ORAL at 21:06

## 2023-10-30 RX ADMIN — CEFTRIAXONE 100 MILLIGRAM(S): 500 INJECTION, POWDER, FOR SOLUTION INTRAMUSCULAR; INTRAVENOUS at 12:05

## 2023-10-30 RX ADMIN — Medication 1 SPRAY(S): at 05:07

## 2023-10-30 RX ADMIN — Medication 650 MILLIGRAM(S): at 23:09

## 2023-10-30 RX ADMIN — Medication 1: at 06:23

## 2023-10-30 RX ADMIN — Medication 3.52 MICROGRAM(S)/KG/MIN: at 08:57

## 2023-10-30 RX ADMIN — Medication 650 MILLIGRAM(S): at 18:19

## 2023-10-30 RX ADMIN — Medication 1 SPRAY(S): at 21:06

## 2023-10-30 RX ADMIN — MIDODRINE HYDROCHLORIDE 5 MILLIGRAM(S): 2.5 TABLET ORAL at 05:05

## 2023-10-30 RX ADMIN — SODIUM CHLORIDE 500 MILLILITER(S): 9 INJECTION, SOLUTION INTRAVENOUS at 09:55

## 2023-10-30 RX ADMIN — PIPERACILLIN AND TAZOBACTAM 25 GRAM(S): 4; .5 INJECTION, POWDER, LYOPHILIZED, FOR SOLUTION INTRAVENOUS at 05:07

## 2023-10-30 RX ADMIN — CHLORHEXIDINE GLUCONATE 1 APPLICATION(S): 213 SOLUTION TOPICAL at 05:06

## 2023-10-30 RX ADMIN — ENOXAPARIN SODIUM 40 MILLIGRAM(S): 100 INJECTION SUBCUTANEOUS at 12:04

## 2023-10-30 RX ADMIN — PANTOPRAZOLE SODIUM 40 MILLIGRAM(S): 20 TABLET, DELAYED RELEASE ORAL at 17:02

## 2023-10-30 RX ADMIN — QUETIAPINE FUMARATE 50 MILLIGRAM(S): 200 TABLET, FILM COATED ORAL at 21:05

## 2023-10-30 RX ADMIN — Medication 100 MILLIGRAM(S): at 21:07

## 2023-10-30 RX ADMIN — MIDODRINE HYDROCHLORIDE 10 MILLIGRAM(S): 2.5 TABLET ORAL at 21:06

## 2023-10-30 RX ADMIN — MIDODRINE HYDROCHLORIDE 10 MILLIGRAM(S): 2.5 TABLET ORAL at 13:12

## 2023-10-30 RX ADMIN — ENOXAPARIN SODIUM 40 MILLIGRAM(S): 100 INJECTION SUBCUTANEOUS at 21:06

## 2023-10-30 RX ADMIN — QUETIAPINE FUMARATE 50 MILLIGRAM(S): 200 TABLET, FILM COATED ORAL at 01:57

## 2023-10-30 RX ADMIN — Medication 100 MILLIGRAM(S): at 13:11

## 2023-10-30 NOTE — PROGRESS NOTE ADULT - ASSESSMENT
IMPRESSION:    Acute hypoxemic respiratory failure   UGIB    A. fib on xarelto   Element sepsis  HO CVA   Likely aspiration     PLAN:    CNS:  DC sedation. Seroquel for agitation/ QTc.     HEENT: Oral care.     PULMONARY: On room air. Aspiration precautions.     CARDIOVASCULAR: 500cc LR bolus. Wean off Levophed. Midodrine 10 mg TID.     GI: Protonix BID. Speech and swallow for diet.      RENAL: No smith. Replete electrolytes as needed.     INFECTIOUS DISEASE: Urine culture: Klebsiella and proteus. Rocephin and Flagyl for now; DC Zosyn.     HEMATOLOGICAL:  Xarelto held; s/p TXA. Prophylactic lovenox for now. Hg stable for the last 2 days.     ENDOCRINE:  Follow up FS.  Insulin protocol if needed.    MUSCULOSKELETAL: Out of bed.      Full code.     Lines: Left IJ. Keep in CCU until off Levophed.

## 2023-10-30 NOTE — PROGRESS NOTE ADULT - ASSESSMENT
IMPRESSION: Patient is a 89 y/o F with diabetes, CVA, afib (on Xarelto), HTN, presented for witnessed bright red blood vomiting. She was intubated for airway protection and admitted to CCU under pulmonary service.    PLAN:    #hematemesis  - witnessed episode of vomiting BRB   - bedside EGD done 10/25: normal esophagus, no bleeding, large amount of blood clots in stomach, three different areas of erythema concerning for dieulafoy lesions; s/p four endoclips   -c/w protonix 40mg IV q12h       CNS: DC precedex and fentanyl. DC seroquel. Monitor off sedation.      HEENT: Oral care    PULMONARY:    - HOB @ 45 degrees  - Mechanically ventilated, failed SBT x 2 on 10/27/23    CARDIOVASCULAR:   - Requiring norepinephrine, currently 0.0676 mcg/kg/min  - Not overloaded on exam    GI:   - Bedside EGD done on 10/25: normal esophagus, no bleeding. Stomach: large amount of blood clots. Three different areas of erythema concerning for dieulafoy lesions. Four endoclips were placed. Large amount of coffee ground secretions were seen in the duodenal bulb and second part of duodenum, no active bleeding or ulcer.  - Transfuse to keep Hb > 8, continue IV Protonix 40 BID, can start OG tube feeds, can resume anticoag with very close monitoring for bleeding. Hb improved to 9.3 on 10/28 without further transfusion.    RENAL:    - Follow up lytes.  Correct as needed.     INFECTIOUS DISEASE:   - BCx NGTD, UCx >100,000 GNR prelim  - Currently on Zosyn    HEMATOLOGICAL:    - Holding anticoag till H/H stabilize, improved to 9.3 on 10/28    ENDOCRINE:    - Follow up FS.  - Insulin sliding scale if needed    MUSCULOSKELETAL:   - Currently not able to ambulate or go OOBTC due to sedation and mech vent. IMPRESSION: Patient is a 89 y/o F with diabetes, CVA, afib (on Xarelto), HTN, presented for witnessed bright red blood vomiting. She was intubated for airway protection and admitted to CCU under pulmonary service.    PLAN:    #element sepsis   #likely aspiration   - BCx NGTD, UCx growing Kleb pneumonia and proteus   - CXR w/ possible R infiltrate   - DC zoysn, switch to rocephin and flagyl   - start weaning levophed (currently on 0.015mcg)  - give 500cc bolus of LR and start D5LR @50cc if not cleared by SS   - start midodrine 10mg if cleared by SS (pending eval)     #UGIB  - witnessed episode of vomiting BRB   - bedside EGD done 10/25: normal esophagus, no bleeding, large amount of blood clots in stomach, three different areas of erythema concerning for dieulafoy lesions; s/p four endoclips   - s/p 1U PRBC 10/25  - c/w protonix 40mg IV q12h   - resume lovenox prophylactic dose; follow up GI to see if cleared for therapeutic dose   - Hb stable around 8.2-8.3  - transfuse to keep Hb >8    #acute hypoxemic respiratory failure   - intubated 10/25   - failed SBT x2 on 10/27/23  - extubated on 10/29, saturating well on RA      #afib on xarelto  #hx of CVA    - holding home AC until cleared by GI     #DMT2  - monitor FS   - ISS if needed     #Misc   DVT ppx: Lovenox 40mg   GI ppx: Protonix 40mg IV BID  Diet: NGT  Activity: IAT   Pending: BP improvement of pressors      IMPRESSION: Patient is a 87 y/o F with diabetes, CVA, afib (on Xarelto), HTN, presented for witnessed bright red blood vomiting. She was intubated for airway protection and admitted to CCU under pulmonary service.    PLAN:    #element sepsis   #likely aspiration   - BCx NGTD, UCx growing Kleb pneumonia and proteus   - CXR w/ possible R infiltrate   - DC zoysn, switch to rocephin and flagyl   - start weaning levophed (currently on 0.015mcg)  - give 500cc bolus of LR and start D5LR @50cc if not cleared by SS   - increase midodrine to 10mg q8h    #UGIB  - witnessed episode of vomiting BRB   - bedside EGD done 10/25: normal esophagus, no bleeding, large amount of blood clots in stomach, three different areas of erythema concerning for dieulafoy lesions; s/p four endoclips   - s/p 1U PRBC 10/25  - c/w protonix 40mg IV q12h   - resume lovenox prophylactic dose; follow up GI to see if cleared for therapeutic dose   - Hb stable around 8.2-8.3  - transfuse to keep Hb >8    #acute hypoxemic respiratory failure   - intubated 10/25   - failed SBT x2 on 10/27/23  - extubated on 10/29, saturating well on RA    - pending SS eval   - fentanyl and precedex DC'd, c/w seroquel 50mg QHS PRN for agitation     #afib on xarelto  #hx of CVA    - holding home AC until cleared by GI     #DMT2  - monitor FS   - ISS if needed     #Misc   DVT ppx: Lovenox 40mg   GI ppx: Protonix 40mg IV BID  Diet: NGT  Activity: IAT   Pending: BP improvement of pressors      IMPRESSION: Patient is a 89 y/o F with diabetes, CVA, afib (on Xarelto), HTN, presented for witnessed bright red blood vomiting. She was intubated for airway protection and admitted to CCU under pulmonary service.    PLAN:    #element sepsis   #likely aspiration   - BCx NGTD, UCx growing Kleb pneumonia and proteus   - CXR w/ possible R infiltrate   - DC zoysn, switch to rocephin and flagyl   - start weaning levophed (currently on 0.015mcg)  - give 500cc bolus of LR and start D5LR @50cc if not cleared by SS   - increase midodrine to 10mg q8h    #UGIB  - witnessed episode of vomiting BRB   - bedside EGD done 10/25: normal esophagus, no bleeding, large amount of blood clots in stomach, three different areas of erythema concerning for dieulafoy lesions; s/p four endoclips   - s/p 1U PRBC 10/25  - c/w protonix 40mg IV q12h   - resume lovenox prophylactic dose; follow up GI to see if cleared for therapeutic dose   - Hb stable around 8.2-8.3  - transfuse to keep Hb >8    #acute hypoxemic respiratory failure   - intubated 10/25   - failed SBT x2 on 10/27/23  - extubated on 10/29, saturating well on RA    - pending SS eval   - fentanyl and precedex DC'd, c/w seroquel 50mg QHS PRN for agitation (monitor daily QTc)    #afib on xarelto  #hx of CVA    - holding home AC until cleared by GI     #DMT2  - monitor FS   - ISS if needed     #Misc   DVT ppx: Lovenox 40mg   GI ppx: Protonix 40mg IV BID  Diet: NGT  Activity: IAT   Pending: BP improvement of pressors      IMPRESSION: Patient is a 87 y/o F with diabetes, CVA, afib (on Xarelto), HTN, presented for witnessed bright red blood vomiting. She was intubated for airway protection and admitted to CCU under pulmonary service.    PLAN:    #element sepsis   #likely aspiration   - BCx NGTD, UCx growing Kleb pneumonia and proteus   - CXR w/ possible R infiltrate   - DC zoysn, switch to rocephin and flagyl   - start weaning levophed (currently on 0.015mcg)  - give 500cc bolus of LR and start D5LR @50cc if not cleared by SS   - increase midodrine to 10mg q8h    #UGIB  - witnessed episode of vomiting BRB   - bedside EGD done 10/25: normal esophagus, no bleeding, large amount of blood clots in stomach, three different areas of erythema concerning for dieulafoy lesions; s/p four endoclips   - s/p 1U PRBC 10/25  - c/w protonix 40mg IV q12h   - resume lovenox prophylactic dose; follow up GI to see if cleared for therapeutic dose   - Hb stable around 8.2-8.3  - transfuse to keep Hb >8    #acute hypoxemic respiratory failure   - intubated 10/25   - failed SBT x2 on 10/27/23  - extubated on 10/29, saturating well on RA    - pending SS eval   - fentanyl and precedex DC'd, c/w seroquel 50mg QHS PRN for agitation (monitor daily QTc)    #afib on xarelto  #hx of CVA    - holding home AC until cleared by GI     #DMT2  - monitor FS   - ISS if needed     #Misc   DVT ppx: Lovenox 40mg   GI ppx: Protonix 40mg IV BID  Diet: NGT  Activity: IAT   Pending: BP improvement off pressors

## 2023-10-30 NOTE — SWALLOW BEDSIDE ASSESSMENT ADULT - PHARYNGEAL PHASE
+audible swallow/Delayed cough post oral intake/Delayed throat clear post oral intake/Multiple swallows +audible swallow/Within functional limits

## 2023-10-30 NOTE — PROGRESS NOTE ADULT - SUBJECTIVE AND OBJECTIVE BOX
Patient is a 88y old  Female who presents with a chief complaint of GIB (29 Oct 2023 12:41)        Over Night Events:    Extubated yesterday  Afebrile   On room air         ROS:  See HPI    PHYSICAL EXAM    ICU Vital Signs Last 24 Hrs  T(C): 35.1 (30 Oct 2023 08:00), Max: 36.7 (30 Oct 2023 00:00)  T(F): 95.2 (30 Oct 2023 08:00), Max: 98 (30 Oct 2023 00:00)  HR: 91 (30 Oct 2023 08:00) (69 - 128)  BP: 91/67 (30 Oct 2023 08:00) (60/37 - 155/69)  BP(mean): 75 (30 Oct 2023 08:00) (45 - 103)  ABP: --  ABP(mean): --  RR: 20 (30 Oct 2023 08:00) (9 - 42)  SpO2: 100% (30 Oct 2023 08:00) (98% - 100%)    O2 Parameters below as of 30 Oct 2023 08:00  Patient On (Oxygen Delivery Method): room air            General: NAD  HEENT: CLIFF             Lymphatic system: No cervical LN   Lungs: Bilateral BS, coarse, room air   Cardiovascular: Regular   Gastrointestinal: Soft, Positive BS  Extremities: No clubbing.  Moves extremities.  Full Range of motion   Skin: Warm, intact  Neurological: Left sided weakness, not making sense this morning       10-29-23 @ 07:01  -  10-30-23 @ 07:00  --------------------------------------------------------  IN:    Dexmedetomidine: 360.4 mL    Enteral Tube Flush: 60 mL    FentaNYL: 19 mL    Lactated Ringers: 675 mL    Norepinephrine: 16.8 mL  Total IN: 1131.2 mL    OUT:    Indwelling Catheter - Urethral (mL): 1285 mL    Voided (mL): 450 mL  Total OUT: 1735 mL    Total NET: -603.8 mL      10-30-23 @ 07:01  -  10-30-23 @ 08:38  --------------------------------------------------------  IN:    Dexmedetomidine: 17.8 mL    Norepinephrine: 1 mL  Total IN: 18.8 mL    OUT:  Total OUT: 0 mL    Total NET: 18.8 mL          LABS:                            8.3    7.41  )-----------( 161      ( 30 Oct 2023 04:30 )             25.9                                               10-30    146  |  111<H>  |  12  ----------------------------<  192<H>  3.7   |  26  |  0.6<L>    Ca    8.2<L>      30 Oct 2023 04:30  Mg     1.9     10-30    TPro  4.8<L>  /  Alb  2.6<L>  /  TBili  0.3  /  DBili  x   /  AST  15  /  ALT  29  /  AlkPhos  47  10-30      PT/INR - ( 29 Oct 2023 23:35 )   PT: 14.10 sec;   INR: 1.23 ratio         PTT - ( 29 Oct 2023 23:35 )  PTT:25.3 sec                                       Urinalysis Basic - ( 30 Oct 2023 04:30 )    Color: x / Appearance: x / SG: x / pH: x  Gluc: 192 mg/dL / Ketone: x  / Bili: x / Urobili: x   Blood: x / Protein: x / Nitrite: x   Leuk Esterase: x / RBC: x / WBC x   Sq Epi: x / Non Sq Epi: x / Bacteria: x                                                  LIVER FUNCTIONS - ( 30 Oct 2023 04:30 )  Alb: 2.6 g/dL / Pro: 4.8 g/dL / ALK PHOS: 47 U/L / ALT: 29 U/L / AST: 15 U/L / GGT: x                                                                                               Mode: CPAP with PS  FiO2: 40  PEEP: 8  PS: 6  MAP: 11  PIP: 15                                      ABG - ( 29 Oct 2023 15:19 )  pH, Arterial: 7.38  pH, Blood: x     /  pCO2: 46    /  pO2: 163   / HCO3: 27    / Base Excess: 1.6   /  SaO2: 100.0               MEDICATIONS  (STANDING):  benzocaine 20% Spray 1 Spray(s) Topical three times a day  chlorhexidine 2% Cloths 1 Application(s) Topical <User Schedule>  dexMEDEtomidine Infusion 0.05 MICROgram(s)/kG/Hr (0.89 mL/Hr) IV Continuous <Continuous>  dextrose 5%. 1000 milliLiter(s) (100 mL/Hr) IV Continuous <Continuous>  dextrose 5%. 1000 milliLiter(s) (50 mL/Hr) IV Continuous <Continuous>  dextrose 50% Injectable 12.5 Gram(s) IV Push once  dextrose 50% Injectable 25 Gram(s) IV Push once  dextrose 50% Injectable 25 Gram(s) IV Push once  fentaNYL   Infusion. 0.5 MICROgram(s)/kG/Hr (3.75 mL/Hr) IV Continuous <Continuous>  glucagon  Injectable 1 milliGRAM(s) IntraMuscular once  insulin lispro (ADMELOG) corrective regimen sliding scale   SubCutaneous three times a day before meals  lactated ringers. 1000 milliLiter(s) (75 mL/Hr) IV Continuous <Continuous>  midodrine. 5 milliGRAM(s) Oral every 8 hours  norepinephrine Infusion 0.05 MICROgram(s)/kG/Min (3.52 mL/Hr) IV Continuous <Continuous>  pantoprazole  Injectable 40 milliGRAM(s) IV Push every 12 hours  piperacillin/tazobactam IVPB.. 3.375 Gram(s) IV Intermittent every 8 hours  QUEtiapine 50 milliGRAM(s) Oral daily  senna 2 Tablet(s) Oral at bedtime    MEDICATIONS  (PRN):  dextrose Oral Gel 15 Gram(s) Oral once PRN Blood Glucose LESS THAN 70 milliGRAM(s)/deciliter      Xrays:  Possible right basilar infiltrate

## 2023-10-30 NOTE — CHART NOTE - NSCHARTNOTEFT_GEN_A_CORE
GI NUTRITION SUPPORT TEAM  -  PROGRESS NOTE     Interval Events:        REVIEW OF SYSTEMS:  Negative except as noted above.     VITALS:  T(F): 96 (10-30 @ 12:00), Max: 96 (10-30 @ 12:00)  HR: 105 (10-30 @ 12:00) (69 - 113)  BP: 129/89 (10-30 @ 12:00) (91/67 - 134/84)  RR: 19 (10-30 @ 12:00) (9 - 34)  SpO2: 94% (10-30 @ 12:00) (94% - 100%)      HEIGHT/WEIGHT/BMI:   Height (cm): 165.1 (10-25)  Weight (kg): 71 (10-25)  BMI (kg/m2): 26 (10-25)    I/Os:     10-29-23 @ 07:01  -  10-30-23 @ 07:00  --------------------------------------------------------  IN:    Dexmedetomidine: 360.4 mL    Enteral Tube Flush: 60 mL    FentaNYL: 19 mL    Lactated Ringers: 675 mL    Norepinephrine: 16.8 mL  Total IN: 1131.2 mL    OUT:    Indwelling Catheter - Urethral (mL): 1285 mL    Voided (mL): 450 mL  Total OUT: 1735 mL    Total NET: -603.8 mL      MEDICATIONS:   benzocaine 20% Spray 1 Spray(s) Topical three times a day  cefTRIAXone   IVPB 1000 milliGRAM(s) IV Intermittent every 24 hours  chlorhexidine 2% Cloths 1 Application(s) Topical <User Schedule>  enoxaparin Injectable 40 milliGRAM(s) SubCutaneous every 24 hours  glucagon  Injectable 1 milliGRAM(s) IntraMuscular once  insulin lispro (ADMELOG) corrective regimen sliding scale   SubCutaneous three times a day before meals  lactated ringers. 1000 milliLiter(s) (75 mL/Hr) IV Continuous <Continuous>  metroNIDAZOLE  IVPB 500 milliGRAM(s) IV Intermittent every 8 hours  midodrine. 10 milliGRAM(s) Oral every 8 hours  norepinephrine Infusion 0.053 MICROgram(s)/kG/Min (3.52 mL/Hr) IV Continuous <Continuous>  pantoprazole  Injectable 40 milliGRAM(s) IV Push every 12 hours  QUEtiapine 50 milliGRAM(s) Oral at bedtime  senna 2 Tablet(s) Oral at bedtime    LABS:                         8.3    7.41  )-----------( 161      ( 30 Oct 2023 04:30 )             25.9     146  |  111<H>  |  12  ----------------------------<  192<H>          (10-30-23 @ 04:30)  3.7   |  26  |  0.6<L>    Ca    8.2<L>          (10-30-23 @ 04:30)  Mg     1.9         (10-30-23 @ 04:30)    TPro  4.8<L>  /  Alb  2.6<L>  /  TBili  0.3  /  DBili  x   /  AST  15  /  ALT  29  /  AlkPhos  47       10-30-23 @ 04:30      Blood Glucose (Past 24 hours):  93 mg/dL (10-30 @ 11:14)  188 mg/dL (10-30 @ 06:20)  140 mg/dL (10-29 @ 16:24)    DIET:   Diet, Pureed:   Mildly Thick Liquids (MILDTHICKLIQS) (10-30-23 @ 11:52) [Active]        ASSESSMENT  88 year old female patient known to have DM, HTN, DL, CVA, and atrial fibrillation on Xarelto who was brought to the ED on 10/25 for evaluation of episodes of hematemesis, found to be hypotensive and tachycardic, admitted for further management. Stay complicated by recurrence of hematemesis requiring intubation for airway protection.    - Hematemesis w/ Hemorrhagic shock secondary to upper GI bleed  - Likely Secondary to Bleeding Gastric Dieulafoy Lesions Status Post 4 Endoclips 10/25  - Acute on Chronic Anemia  - hypokalemia       PLAN  - add in phos to next blood draw   - PO diet as tolerated, consistency per speech. Add CHO consistent heart healthy dietary modifications  - glycemic control   - will follow GI NUTRITION SUPPORT TEAM  -  PROGRESS NOTE     Interval Events:    Extubated yesterday  Awake, alert  Passed speech eval and started on pureed diet  PO intake poor today, dislikes pureed food    REVIEW OF SYSTEMS:  Negative except as noted above.     VITALS:  T(F): 96 (10-30 @ 12:00), Max: 96 (10-30 @ 12:00)  HR: 105 (10-30 @ 12:00) (69 - 113)  BP: 129/89 (10-30 @ 12:00) (91/67 - 134/84)  RR: 19 (10-30 @ 12:00) (9 - 34)  SpO2: 94% (10-30 @ 12:00) (94% - 100%)      HEIGHT/WEIGHT/BMI:   Height (cm): 165.1 (10-25)  Weight (kg): 71 (10-25)  BMI (kg/m2): 26 (10-25)    I/Os:     10-29-23 @ 07:01  -  10-30-23 @ 07:00  --------------------------------------------------------  IN:    Dexmedetomidine: 360.4 mL    Enteral Tube Flush: 60 mL    FentaNYL: 19 mL    Lactated Ringers: 675 mL    Norepinephrine: 16.8 mL  Total IN: 1131.2 mL    OUT:    Indwelling Catheter - Urethral (mL): 1285 mL    Voided (mL): 450 mL  Total OUT: 1735 mL    Total NET: -603.8 mL      MEDICATIONS:   benzocaine 20% Spray 1 Spray(s) Topical three times a day  cefTRIAXone   IVPB 1000 milliGRAM(s) IV Intermittent every 24 hours  chlorhexidine 2% Cloths 1 Application(s) Topical <User Schedule>  enoxaparin Injectable 40 milliGRAM(s) SubCutaneous every 24 hours  glucagon  Injectable 1 milliGRAM(s) IntraMuscular once  insulin lispro (ADMELOG) corrective regimen sliding scale   SubCutaneous three times a day before meals  lactated ringers. 1000 milliLiter(s) (75 mL/Hr) IV Continuous <Continuous>  metroNIDAZOLE  IVPB 500 milliGRAM(s) IV Intermittent every 8 hours  midodrine. 10 milliGRAM(s) Oral every 8 hours  norepinephrine Infusion 0.053 MICROgram(s)/kG/Min (3.52 mL/Hr) IV Continuous <Continuous>  pantoprazole  Injectable 40 milliGRAM(s) IV Push every 12 hours  QUEtiapine 50 milliGRAM(s) Oral at bedtime  senna 2 Tablet(s) Oral at bedtime    LABS:                         8.3    7.41  )-----------( 161      ( 30 Oct 2023 04:30 )             25.9     146  |  111<H>  |  12  ----------------------------<  192<H>          (10-30-23 @ 04:30)  3.7   |  26  |  0.6<L>    Ca    8.2<L>          (10-30-23 @ 04:30)  Mg     1.9         (10-30-23 @ 04:30)    TPro  4.8<L>  /  Alb  2.6<L>  /  TBili  0.3  /  DBili  x   /  AST  15  /  ALT  29  /  AlkPhos  47       10-30-23 @ 04:30      Blood Glucose (Past 24 hours):  93 mg/dL (10-30 @ 11:14)  188 mg/dL (10-30 @ 06:20)  140 mg/dL (10-29 @ 16:24)    DIET:   Diet, Pureed:   Mildly Thick Liquids (MILDTHICKLIQS) (10-30-23 @ 11:52) [Active]      ASSESSMENT  88 year old female patient known to have DM, HTN, DL, CVA, and atrial fibrillation on Xarelto who was brought to the ED on 10/25 for evaluation of episodes of hematemesis, found to be hypotensive and tachycardic, admitted for further management. Stay complicated by recurrence of hematemesis requiring intubation for airway protection.    - Hematemesis w/ Hemorrhagic shock secondary to upper GI bleed  - Likely Secondary to Bleeding Gastric Dieulafoy Lesions Status Post 4 Endoclips 10/25  - Acute on Chronic Anemia  - hypokalemia       PLAN  - add in phos to next blood draw   - PO diet as tolerated, consistency per speech. Add CHO consistent heart healthy dietary modifications  - add magic cup 4oz q12hrs   - glycemic control   - will follow

## 2023-10-30 NOTE — PROGRESS NOTE ADULT - SUBJECTIVE AND OBJECTIVE BOX
24H events:    Patient is a 88y old Female who presents with a chief complaint of GIB (30 Oct 2023 08:38)    Primary diagnosis of Upper GI bleed    Today is hospital day 5d.   Extubated yesterday, saturating well on RA.   Required extra dose seroquel 50mg overnight for agitation.     Code Status:    Family communication:  Contact date:  Name of person contacted:  Relationship to patient:  Communication details:  What matters most:    PAST MEDICAL & SURGICAL HISTORY  Diabetes    Hypertension    Atrial fibrillation    Cerebrovascular accident (CVA)      SOCIAL HISTORY:  Social History:      ALLERGIES:  No Known Allergies    MEDICATIONS:  STANDING MEDICATIONS  benzocaine 20% Spray 1 Spray(s) Topical three times a day  cefTRIAXone   IVPB 1000 milliGRAM(s) IV Intermittent every 24 hours  chlorhexidine 2% Cloths 1 Application(s) Topical <User Schedule>  enoxaparin Injectable 40 milliGRAM(s) SubCutaneous every 24 hours  glucagon  Injectable 1 milliGRAM(s) IntraMuscular once  insulin lispro (ADMELOG) corrective regimen sliding scale   SubCutaneous three times a day before meals  lactated ringers Bolus 500 milliLiter(s) IV Bolus once  lactated ringers. 1000 milliLiter(s) IV Continuous <Continuous>  metroNIDAZOLE  IVPB 500 milliGRAM(s) IV Intermittent every 8 hours  midodrine. 10 milliGRAM(s) Oral every 8 hours  norepinephrine Infusion 0.053 MICROgram(s)/kG/Min IV Continuous <Continuous>  pantoprazole  Injectable 40 milliGRAM(s) IV Push every 12 hours  QUEtiapine 50 milliGRAM(s) Oral at bedtime  senna 2 Tablet(s) Oral at bedtime    PRN MEDICATIONS    VITALS:   T(F): 95.2  HR: 110  BP: 94/51  RR: 15  SpO2: 100%    PHYSICAL EXAM:  GENERAL:   (  ) NAD, lying in bed comfortably     (  ) obtunded     (  ) lethargic     (  ) somnolent    HEAD:   (  ) Atraumatic     (  ) hematoma     (  ) laceration (specify location:       )     NECK:  (  ) Supple     (  ) neck stiffness     (  ) nuchal rigidity     (  )  no JVD     (  ) JVD present ( -- cm)    HEART:  Rate -->     (  ) normal rate     (  ) bradycardic     (  ) tachycardic  Rhythm -->     (  ) regular     (  ) regularly irregular     (  ) irregularly irregular  Murmurs -->     (  ) normal s1s2     (  ) systolic murmur     (  ) diastolic murmur     (  ) continuous murmur      (  ) S3 present     (  ) S4 present    LUNGS:   (  )Unlabored respirations     (  ) tachypnea  (  ) B/L air entry     (  ) decreased breath sounds in:  (location     )    (  ) no adventitious sound     (  ) crackles     (  ) wheezing      (  ) rhonchi      (specify location:       )  (  ) chest wall tenderness (specify location:       )    ABDOMEN:   (  ) Soft     (  ) tense   |   (  ) nondistended     (  ) distended   |   (  ) +BS     (  ) hypoactive bowel sounds     (  ) hyperactive bowel sounds  (  ) nontender     (  ) RUQ tenderness     (  ) RLQ tenderness     (  ) LLQ tenderness     (  ) epigastric tenderness     (  ) diffuse tenderness  (  ) Splenomegaly      (  ) Hepatomegaly      (  ) Jaundice     (  ) ecchymosis     EXTREMITIES:  (  ) Normal     (  ) Rash     (  ) ecchymosis     (  ) varicose veins      (  ) pitting edema     (  ) non-pitting edema   (  ) ulceration     (  ) gangrene:     (location:     )    NERVOUS SYSTEM:    (  ) A&Ox3     (  ) confused     (  ) lethargic  CN II-XII:     (  ) Intact     (  ) deficits found     (Specify:     )   Upper extremities:     (  ) no sensorimotor deficits     (  ) weakness     (  ) loss of proprioception/vibration     (  ) loss of touch/temperature (specify:    )  Lower extremities:     (  ) no sensorimotor deficits     (  ) weakness     (  ) loss of proprioception/vibration     (  ) loss of touch/temperature (specify:    )    SKIN:   (  ) No rashes or lesions     (  ) maculopapular rash     (  ) pustules     (  ) vesicles     (  ) ulcer     (  ) ecchymosis     (specify location:     )    AMPAC score:    (  ) Indwelling De Guzman Catheter:   Date insterted:    Reason (  ) Critical illness     (  ) urinary retention    (  ) Accurate Ins/Outs Monitoring     (  ) CMO patient    (  ) Central Line:   Date inserted:  Location: (  ) Right IJ     (  ) Left IJ     (  ) Right Fem     (  ) Left Fem    (  ) SPC        (  ) pigtail       (  ) PEG tube       (  ) colostomy       (  ) jejunostomy  (  ) U-Dall    LABS:                        8.3    7.41  )-----------( 161      ( 30 Oct 2023 04:30 )             25.9     10-30    146  |  111<H>  |  12  ----------------------------<  192<H>  3.7   |  26  |  0.6<L>    Ca    8.2<L>      30 Oct 2023 04:30  Mg     1.9     10-30    TPro  4.8<L>  /  Alb  2.6<L>  /  TBili  0.3  /  DBili  x   /  AST  15  /  ALT  29  /  AlkPhos  47  10-30    PT/INR - ( 29 Oct 2023 23:35 )   PT: 14.10 sec;   INR: 1.23 ratio         PTT - ( 29 Oct 2023 23:35 )  PTT:25.3 sec  Urinalysis Basic - ( 30 Oct 2023 04:30 )    Color: x / Appearance: x / SG: x / pH: x  Gluc: 192 mg/dL / Ketone: x  / Bili: x / Urobili: x   Blood: x / Protein: x / Nitrite: x   Leuk Esterase: x / RBC: x / WBC x   Sq Epi: x / Non Sq Epi: x / Bacteria: x      ABG - ( 29 Oct 2023 15:19 )  pH, Arterial: 7.38  pH, Blood: x     /  pCO2: 46    /  pO2: 163   / HCO3: 27    / Base Excess: 1.6   /  SaO2: 100.0                     RADIOLOGY:           24H events:    Patient is a 88y old Female who presents with a chief complaint of GIB (30 Oct 2023 08:38)    Primary diagnosis of Upper GI bleed    Today is hospital day 5d.   Extubated yesterday, saturating well on RA.   Required dose Seroquel 50mg overnight for agitation.     Code Status: Full      PAST MEDICAL & SURGICAL HISTORY  Diabetes    Hypertension    Atrial fibrillation    Cerebrovascular accident (CVA)      SOCIAL HISTORY:  Social History:      ALLERGIES:  No Known Allergies    MEDICATIONS:  STANDING MEDICATIONS  benzocaine 20% Spray 1 Spray(s) Topical three times a day  cefTRIAXone   IVPB 1000 milliGRAM(s) IV Intermittent every 24 hours  chlorhexidine 2% Cloths 1 Application(s) Topical <User Schedule>  enoxaparin Injectable 40 milliGRAM(s) SubCutaneous every 24 hours  glucagon  Injectable 1 milliGRAM(s) IntraMuscular once  insulin lispro (ADMELOG) corrective regimen sliding scale   SubCutaneous three times a day before meals  lactated ringers Bolus 500 milliLiter(s) IV Bolus once  lactated ringers. 1000 milliLiter(s) IV Continuous <Continuous>  metroNIDAZOLE  IVPB 500 milliGRAM(s) IV Intermittent every 8 hours  midodrine. 10 milliGRAM(s) Oral every 8 hours  norepinephrine Infusion 0.053 MICROgram(s)/kG/Min IV Continuous <Continuous>  pantoprazole  Injectable 40 milliGRAM(s) IV Push every 12 hours  QUEtiapine 50 milliGRAM(s) Oral at bedtime  senna 2 Tablet(s) Oral at bedtime    PRN MEDICATIONS    VITALS:   T(F): 95.2  HR: 110  BP: 94/51  RR: 15  SpO2: 100%    PHYSICAL EXAM:    GENERAL: NAD, lying in bed comfortably   HEENT: PERRLA, EOMI  LYMPHATICS: No cervical LAD   CHEST/LUNG: Unlabored respirations, b/l air entry, no adventitious breath sounds; on RA   CARDIO: Regular rate and rhythm  ABDOMEN: Soft, nontender, nondistended, +BS  EXTREMITIES: No clubbing, cyanosis, or edema   NEURO: Lethargic   SKIN: No rashes or lesions       LABS:                        8.3    7.41  )-----------( 161      ( 30 Oct 2023 04:30 )             25.9     10-30    146  |  111<H>  |  12  ----------------------------<  192<H>  3.7   |  26  |  0.6<L>    Ca    8.2<L>      30 Oct 2023 04:30  Mg     1.9     10-30    TPro  4.8<L>  /  Alb  2.6<L>  /  TBili  0.3  /  DBili  x   /  AST  15  /  ALT  29  /  AlkPhos  47  10-30    PT/INR - ( 29 Oct 2023 23:35 )   PT: 14.10 sec;   INR: 1.23 ratio         PTT - ( 29 Oct 2023 23:35 )  PTT:25.3 sec  Urinalysis Basic - ( 30 Oct 2023 04:30 )    Color: x / Appearance: x / SG: x / pH: x  Gluc: 192 mg/dL / Ketone: x  / Bili: x / Urobili: x   Blood: x / Protein: x / Nitrite: x   Leuk Esterase: x / RBC: x / WBC x   Sq Epi: x / Non Sq Epi: x / Bacteria: x      ABG - ( 29 Oct 2023 15:19 )  pH, Arterial: 7.38  pH, Blood: x     /  pCO2: 46    /  pO2: 163   / HCO3: 27    / Base Excess: 1.6   /  SaO2: 100.0       RADIOLOGY:    < from: Xray Chest 1 View- PORTABLE-Routine (10.30.23 @ 07:09) >    FINDINGS:    Support devices: Left IJ central venous catheter is in stable position.    Cardiac/mediastinum/hilum: Stable.    Lung parenchyma/Pleura: No focal parenchymal opacities, pleural   effusions, or pneumothorax.    Skeleton/soft tissues: Stable.      IMPRESSION:    No focal parenchymal opacities, pleural effusions, or pneumothorax.    --- End of Report ---

## 2023-10-31 LAB
ALBUMIN SERPL ELPH-MCNC: 2.8 G/DL — LOW (ref 3.5–5.2)
ALBUMIN SERPL ELPH-MCNC: 2.8 G/DL — LOW (ref 3.5–5.2)
ALP SERPL-CCNC: 51 U/L — SIGNIFICANT CHANGE UP (ref 30–115)
ALP SERPL-CCNC: 51 U/L — SIGNIFICANT CHANGE UP (ref 30–115)
ALT FLD-CCNC: 22 U/L — SIGNIFICANT CHANGE UP (ref 0–41)
ALT FLD-CCNC: 22 U/L — SIGNIFICANT CHANGE UP (ref 0–41)
ANION GAP SERPL CALC-SCNC: 9 MMOL/L — SIGNIFICANT CHANGE UP (ref 7–14)
ANION GAP SERPL CALC-SCNC: 9 MMOL/L — SIGNIFICANT CHANGE UP (ref 7–14)
AST SERPL-CCNC: 13 U/L — SIGNIFICANT CHANGE UP (ref 0–41)
AST SERPL-CCNC: 13 U/L — SIGNIFICANT CHANGE UP (ref 0–41)
BASOPHILS # BLD AUTO: 0.03 K/UL — SIGNIFICANT CHANGE UP (ref 0–0.2)
BASOPHILS # BLD AUTO: 0.03 K/UL — SIGNIFICANT CHANGE UP (ref 0–0.2)
BASOPHILS # BLD AUTO: 0.05 K/UL — SIGNIFICANT CHANGE UP (ref 0–0.2)
BASOPHILS # BLD AUTO: 0.05 K/UL — SIGNIFICANT CHANGE UP (ref 0–0.2)
BASOPHILS NFR BLD AUTO: 0.3 % — SIGNIFICANT CHANGE UP (ref 0–1)
BASOPHILS NFR BLD AUTO: 0.3 % — SIGNIFICANT CHANGE UP (ref 0–1)
BASOPHILS NFR BLD AUTO: 0.5 % — SIGNIFICANT CHANGE UP (ref 0–1)
BASOPHILS NFR BLD AUTO: 0.5 % — SIGNIFICANT CHANGE UP (ref 0–1)
BILIRUB SERPL-MCNC: <0.2 MG/DL — SIGNIFICANT CHANGE UP (ref 0.2–1.2)
BILIRUB SERPL-MCNC: <0.2 MG/DL — SIGNIFICANT CHANGE UP (ref 0.2–1.2)
BUN SERPL-MCNC: 12 MG/DL — SIGNIFICANT CHANGE UP (ref 10–20)
BUN SERPL-MCNC: 12 MG/DL — SIGNIFICANT CHANGE UP (ref 10–20)
CALCIUM SERPL-MCNC: 8.5 MG/DL — SIGNIFICANT CHANGE UP (ref 8.4–10.4)
CALCIUM SERPL-MCNC: 8.5 MG/DL — SIGNIFICANT CHANGE UP (ref 8.4–10.4)
CHLORIDE SERPL-SCNC: 112 MMOL/L — HIGH (ref 98–110)
CHLORIDE SERPL-SCNC: 112 MMOL/L — HIGH (ref 98–110)
CO2 SERPL-SCNC: 27 MMOL/L — SIGNIFICANT CHANGE UP (ref 17–32)
CO2 SERPL-SCNC: 27 MMOL/L — SIGNIFICANT CHANGE UP (ref 17–32)
CREAT SERPL-MCNC: 0.7 MG/DL — SIGNIFICANT CHANGE UP (ref 0.7–1.5)
CREAT SERPL-MCNC: 0.7 MG/DL — SIGNIFICANT CHANGE UP (ref 0.7–1.5)
CULTURE RESULTS: SIGNIFICANT CHANGE UP
CULTURE RESULTS: SIGNIFICANT CHANGE UP
EGFR: 84 ML/MIN/1.73M2 — SIGNIFICANT CHANGE UP
EGFR: 84 ML/MIN/1.73M2 — SIGNIFICANT CHANGE UP
EOSINOPHIL # BLD AUTO: 0.15 K/UL — SIGNIFICANT CHANGE UP (ref 0–0.7)
EOSINOPHIL # BLD AUTO: 0.15 K/UL — SIGNIFICANT CHANGE UP (ref 0–0.7)
EOSINOPHIL # BLD AUTO: 0.22 K/UL — SIGNIFICANT CHANGE UP (ref 0–0.7)
EOSINOPHIL # BLD AUTO: 0.22 K/UL — SIGNIFICANT CHANGE UP (ref 0–0.7)
EOSINOPHIL NFR BLD AUTO: 1.6 % — SIGNIFICANT CHANGE UP (ref 0–8)
EOSINOPHIL NFR BLD AUTO: 1.6 % — SIGNIFICANT CHANGE UP (ref 0–8)
EOSINOPHIL NFR BLD AUTO: 2.4 % — SIGNIFICANT CHANGE UP (ref 0–8)
EOSINOPHIL NFR BLD AUTO: 2.4 % — SIGNIFICANT CHANGE UP (ref 0–8)
GLUCOSE BLDC GLUCOMTR-MCNC: 150 MG/DL — HIGH (ref 70–99)
GLUCOSE BLDC GLUCOMTR-MCNC: 150 MG/DL — HIGH (ref 70–99)
GLUCOSE BLDC GLUCOMTR-MCNC: 165 MG/DL — HIGH (ref 70–99)
GLUCOSE BLDC GLUCOMTR-MCNC: 165 MG/DL — HIGH (ref 70–99)
GLUCOSE BLDC GLUCOMTR-MCNC: 173 MG/DL — HIGH (ref 70–99)
GLUCOSE BLDC GLUCOMTR-MCNC: 173 MG/DL — HIGH (ref 70–99)
GLUCOSE SERPL-MCNC: 162 MG/DL — HIGH (ref 70–99)
GLUCOSE SERPL-MCNC: 162 MG/DL — HIGH (ref 70–99)
HCT VFR BLD CALC: 26.3 % — LOW (ref 37–47)
HCT VFR BLD CALC: 26.3 % — LOW (ref 37–47)
HCT VFR BLD CALC: 28.1 % — LOW (ref 37–47)
HCT VFR BLD CALC: 28.1 % — LOW (ref 37–47)
HGB BLD-MCNC: 8.4 G/DL — LOW (ref 12–16)
HGB BLD-MCNC: 8.4 G/DL — LOW (ref 12–16)
HGB BLD-MCNC: 9 G/DL — LOW (ref 12–16)
HGB BLD-MCNC: 9 G/DL — LOW (ref 12–16)
IMM GRANULOCYTES NFR BLD AUTO: 0.3 % — SIGNIFICANT CHANGE UP (ref 0.1–0.3)
LYMPHOCYTES # BLD AUTO: 1.65 K/UL — SIGNIFICANT CHANGE UP (ref 1.2–3.4)
LYMPHOCYTES # BLD AUTO: 1.65 K/UL — SIGNIFICANT CHANGE UP (ref 1.2–3.4)
LYMPHOCYTES # BLD AUTO: 17.4 % — LOW (ref 20.5–51.1)
LYMPHOCYTES # BLD AUTO: 17.4 % — LOW (ref 20.5–51.1)
LYMPHOCYTES # BLD AUTO: 2.24 K/UL — SIGNIFICANT CHANGE UP (ref 1.2–3.4)
LYMPHOCYTES # BLD AUTO: 2.24 K/UL — SIGNIFICANT CHANGE UP (ref 1.2–3.4)
LYMPHOCYTES # BLD AUTO: 24.1 % — SIGNIFICANT CHANGE UP (ref 20.5–51.1)
LYMPHOCYTES # BLD AUTO: 24.1 % — SIGNIFICANT CHANGE UP (ref 20.5–51.1)
MAGNESIUM SERPL-MCNC: 1.8 MG/DL — SIGNIFICANT CHANGE UP (ref 1.8–2.4)
MAGNESIUM SERPL-MCNC: 1.8 MG/DL — SIGNIFICANT CHANGE UP (ref 1.8–2.4)
MCHC RBC-ENTMCNC: 27.9 PG — SIGNIFICANT CHANGE UP (ref 27–31)
MCHC RBC-ENTMCNC: 27.9 PG — SIGNIFICANT CHANGE UP (ref 27–31)
MCHC RBC-ENTMCNC: 28 PG — SIGNIFICANT CHANGE UP (ref 27–31)
MCHC RBC-ENTMCNC: 28 PG — SIGNIFICANT CHANGE UP (ref 27–31)
MCHC RBC-ENTMCNC: 31.9 G/DL — LOW (ref 32–37)
MCHC RBC-ENTMCNC: 31.9 G/DL — LOW (ref 32–37)
MCHC RBC-ENTMCNC: 32 G/DL — SIGNIFICANT CHANGE UP (ref 32–37)
MCHC RBC-ENTMCNC: 32 G/DL — SIGNIFICANT CHANGE UP (ref 32–37)
MCV RBC AUTO: 87.4 FL — SIGNIFICANT CHANGE UP (ref 81–99)
MCV RBC AUTO: 87.4 FL — SIGNIFICANT CHANGE UP (ref 81–99)
MCV RBC AUTO: 87.5 FL — SIGNIFICANT CHANGE UP (ref 81–99)
MCV RBC AUTO: 87.5 FL — SIGNIFICANT CHANGE UP (ref 81–99)
MONOCYTES # BLD AUTO: 0.94 K/UL — HIGH (ref 0.1–0.6)
MONOCYTES # BLD AUTO: 0.94 K/UL — HIGH (ref 0.1–0.6)
MONOCYTES # BLD AUTO: 1.09 K/UL — HIGH (ref 0.1–0.6)
MONOCYTES # BLD AUTO: 1.09 K/UL — HIGH (ref 0.1–0.6)
MONOCYTES NFR BLD AUTO: 11.7 % — HIGH (ref 1.7–9.3)
MONOCYTES NFR BLD AUTO: 11.7 % — HIGH (ref 1.7–9.3)
MONOCYTES NFR BLD AUTO: 9.9 % — HIGH (ref 1.7–9.3)
MONOCYTES NFR BLD AUTO: 9.9 % — HIGH (ref 1.7–9.3)
NEUTROPHILS # BLD AUTO: 5.68 K/UL — SIGNIFICANT CHANGE UP (ref 1.4–6.5)
NEUTROPHILS # BLD AUTO: 5.68 K/UL — SIGNIFICANT CHANGE UP (ref 1.4–6.5)
NEUTROPHILS # BLD AUTO: 6.7 K/UL — HIGH (ref 1.4–6.5)
NEUTROPHILS # BLD AUTO: 6.7 K/UL — HIGH (ref 1.4–6.5)
NEUTROPHILS NFR BLD AUTO: 61 % — SIGNIFICANT CHANGE UP (ref 42.2–75.2)
NEUTROPHILS NFR BLD AUTO: 61 % — SIGNIFICANT CHANGE UP (ref 42.2–75.2)
NEUTROPHILS NFR BLD AUTO: 70.5 % — SIGNIFICANT CHANGE UP (ref 42.2–75.2)
NEUTROPHILS NFR BLD AUTO: 70.5 % — SIGNIFICANT CHANGE UP (ref 42.2–75.2)
NRBC # BLD: 0 /100 WBCS — SIGNIFICANT CHANGE UP (ref 0–0)
PHOSPHATE SERPL-MCNC: 3.1 MG/DL — SIGNIFICANT CHANGE UP (ref 2.1–4.9)
PHOSPHATE SERPL-MCNC: 3.1 MG/DL — SIGNIFICANT CHANGE UP (ref 2.1–4.9)
PLATELET # BLD AUTO: 226 K/UL — SIGNIFICANT CHANGE UP (ref 130–400)
PLATELET # BLD AUTO: 226 K/UL — SIGNIFICANT CHANGE UP (ref 130–400)
PLATELET # BLD AUTO: 270 K/UL — SIGNIFICANT CHANGE UP (ref 130–400)
PLATELET # BLD AUTO: 270 K/UL — SIGNIFICANT CHANGE UP (ref 130–400)
PMV BLD: 10.6 FL — HIGH (ref 7.4–10.4)
PMV BLD: 10.6 FL — HIGH (ref 7.4–10.4)
PMV BLD: 10.9 FL — HIGH (ref 7.4–10.4)
PMV BLD: 10.9 FL — HIGH (ref 7.4–10.4)
POTASSIUM SERPL-MCNC: 3.4 MMOL/L — LOW (ref 3.5–5)
POTASSIUM SERPL-MCNC: 3.4 MMOL/L — LOW (ref 3.5–5)
POTASSIUM SERPL-SCNC: 3.4 MMOL/L — LOW (ref 3.5–5)
POTASSIUM SERPL-SCNC: 3.4 MMOL/L — LOW (ref 3.5–5)
PROT SERPL-MCNC: 5 G/DL — LOW (ref 6–8)
PROT SERPL-MCNC: 5 G/DL — LOW (ref 6–8)
RBC # BLD: 3.01 M/UL — LOW (ref 4.2–5.4)
RBC # BLD: 3.01 M/UL — LOW (ref 4.2–5.4)
RBC # BLD: 3.21 M/UL — LOW (ref 4.2–5.4)
RBC # BLD: 3.21 M/UL — LOW (ref 4.2–5.4)
RBC # FLD: 15.9 % — HIGH (ref 11.5–14.5)
RBC # FLD: 15.9 % — HIGH (ref 11.5–14.5)
RBC # FLD: 16 % — HIGH (ref 11.5–14.5)
RBC # FLD: 16 % — HIGH (ref 11.5–14.5)
SODIUM SERPL-SCNC: 148 MMOL/L — HIGH (ref 135–146)
SODIUM SERPL-SCNC: 148 MMOL/L — HIGH (ref 135–146)
SPECIMEN SOURCE: SIGNIFICANT CHANGE UP
SPECIMEN SOURCE: SIGNIFICANT CHANGE UP
WBC # BLD: 9.31 K/UL — SIGNIFICANT CHANGE UP (ref 4.8–10.8)
WBC # BLD: 9.31 K/UL — SIGNIFICANT CHANGE UP (ref 4.8–10.8)
WBC # BLD: 9.5 K/UL — SIGNIFICANT CHANGE UP (ref 4.8–10.8)
WBC # BLD: 9.5 K/UL — SIGNIFICANT CHANGE UP (ref 4.8–10.8)
WBC # FLD AUTO: 9.31 K/UL — SIGNIFICANT CHANGE UP (ref 4.8–10.8)
WBC # FLD AUTO: 9.31 K/UL — SIGNIFICANT CHANGE UP (ref 4.8–10.8)
WBC # FLD AUTO: 9.5 K/UL — SIGNIFICANT CHANGE UP (ref 4.8–10.8)
WBC # FLD AUTO: 9.5 K/UL — SIGNIFICANT CHANGE UP (ref 4.8–10.8)

## 2023-10-31 PROCEDURE — 74018 RADEX ABDOMEN 1 VIEW: CPT | Mod: 26

## 2023-10-31 PROCEDURE — 99233 SBSQ HOSP IP/OBS HIGH 50: CPT

## 2023-10-31 RX ORDER — METOPROLOL TARTRATE 50 MG
12.5 TABLET ORAL
Refills: 0 | Status: DISCONTINUED | OUTPATIENT
Start: 2023-10-31 | End: 2023-11-01

## 2023-10-31 RX ORDER — POTASSIUM CHLORIDE 20 MEQ
20 PACKET (EA) ORAL
Refills: 0 | Status: COMPLETED | OUTPATIENT
Start: 2023-10-31 | End: 2023-10-31

## 2023-10-31 RX ORDER — PANTOPRAZOLE SODIUM 20 MG/1
40 TABLET, DELAYED RELEASE ORAL
Refills: 0 | Status: DISCONTINUED | OUTPATIENT
Start: 2023-10-31 | End: 2023-11-05

## 2023-10-31 RX ORDER — PANTOPRAZOLE SODIUM 20 MG/1
40 TABLET, DELAYED RELEASE ORAL EVERY 12 HOURS
Refills: 0 | Status: DISCONTINUED | OUTPATIENT
Start: 2023-10-31 | End: 2023-10-31

## 2023-10-31 RX ORDER — PANTOPRAZOLE SODIUM 20 MG/1
40 TABLET, DELAYED RELEASE ORAL
Refills: 0 | Status: DISCONTINUED | OUTPATIENT
Start: 2023-10-31 | End: 2023-10-31

## 2023-10-31 RX ORDER — MAGNESIUM SULFATE 500 MG/ML
2 VIAL (ML) INJECTION ONCE
Refills: 0 | Status: COMPLETED | OUTPATIENT
Start: 2023-10-31 | End: 2023-10-31

## 2023-10-31 RX ORDER — ENOXAPARIN SODIUM 100 MG/ML
70 INJECTION SUBCUTANEOUS EVERY 12 HOURS
Refills: 0 | Status: DISCONTINUED | OUTPATIENT
Start: 2023-10-31 | End: 2023-11-02

## 2023-10-31 RX ORDER — POLYETHYLENE GLYCOL 3350 17 G/17G
17 POWDER, FOR SOLUTION ORAL
Refills: 0 | Status: DISCONTINUED | OUTPATIENT
Start: 2023-10-31 | End: 2023-11-05

## 2023-10-31 RX ORDER — LACTULOSE 10 G/15ML
20 SOLUTION ORAL EVERY 6 HOURS
Refills: 0 | Status: DISCONTINUED | OUTPATIENT
Start: 2023-10-31 | End: 2023-10-31

## 2023-10-31 RX ORDER — LACTULOSE 10 G/15ML
20 SOLUTION ORAL EVERY 6 HOURS
Refills: 0 | Status: DISCONTINUED | OUTPATIENT
Start: 2023-10-31 | End: 2023-11-05

## 2023-10-31 RX ADMIN — Medication 50 MILLIEQUIVALENT(S): at 09:07

## 2023-10-31 RX ADMIN — Medication 100 MILLIGRAM(S): at 13:47

## 2023-10-31 RX ADMIN — Medication 1: at 11:17

## 2023-10-31 RX ADMIN — Medication 50 MILLIEQUIVALENT(S): at 07:39

## 2023-10-31 RX ADMIN — Medication 25 GRAM(S): at 09:07

## 2023-10-31 RX ADMIN — SENNA PLUS 2 TABLET(S): 8.6 TABLET ORAL at 21:09

## 2023-10-31 RX ADMIN — CHLORHEXIDINE GLUCONATE 1 APPLICATION(S): 213 SOLUTION TOPICAL at 05:14

## 2023-10-31 RX ADMIN — QUETIAPINE FUMARATE 50 MILLIGRAM(S): 200 TABLET, FILM COATED ORAL at 21:09

## 2023-10-31 RX ADMIN — Medication 1 SPRAY(S): at 05:15

## 2023-10-31 RX ADMIN — MIDODRINE HYDROCHLORIDE 10 MILLIGRAM(S): 2.5 TABLET ORAL at 05:14

## 2023-10-31 RX ADMIN — ENOXAPARIN SODIUM 70 MILLIGRAM(S): 100 INJECTION SUBCUTANEOUS at 17:43

## 2023-10-31 RX ADMIN — Medication 100 MILLIGRAM(S): at 05:14

## 2023-10-31 RX ADMIN — PANTOPRAZOLE SODIUM 40 MILLIGRAM(S): 20 TABLET, DELAYED RELEASE ORAL at 05:14

## 2023-10-31 RX ADMIN — Medication 1: at 07:18

## 2023-10-31 RX ADMIN — POLYETHYLENE GLYCOL 3350 17 GRAM(S): 17 POWDER, FOR SOLUTION ORAL at 17:44

## 2023-10-31 RX ADMIN — PANTOPRAZOLE SODIUM 40 MILLIGRAM(S): 20 TABLET, DELAYED RELEASE ORAL at 17:43

## 2023-10-31 RX ADMIN — Medication 1 SPRAY(S): at 21:09

## 2023-10-31 RX ADMIN — ENOXAPARIN SODIUM 70 MILLIGRAM(S): 100 INJECTION SUBCUTANEOUS at 09:07

## 2023-10-31 RX ADMIN — Medication 12.5 MILLIGRAM(S): at 17:44

## 2023-10-31 RX ADMIN — Medication 100 MILLIGRAM(S): at 21:10

## 2023-10-31 RX ADMIN — Medication 50 MILLIEQUIVALENT(S): at 08:28

## 2023-10-31 RX ADMIN — CEFTRIAXONE 100 MILLIGRAM(S): 500 INJECTION, POWDER, FOR SOLUTION INTRAMUSCULAR; INTRAVENOUS at 11:39

## 2023-10-31 RX ADMIN — LACTULOSE 20 GRAM(S): 10 SOLUTION ORAL at 17:44

## 2023-10-31 NOTE — PROGRESS NOTE ADULT - SUBJECTIVE AND OBJECTIVE BOX
CRISSY JAMES  88y  Female  HPI:    88-year-old female with a past medical history significant for diabetes, CVA, atrial fibrillation on Xarelto, HTN,  who presents due to concern for upper GI bleed. Per patient she had epigastric burning sensation this morning when she woke up. This was associated with nausea and multiple episodes of vomiting. Per daughter, patient vomited a cupful of bright red blood with clots (three times).  Patient in route was hemodynamically stable however when presented to the ED patient was noted to be hypotensive and tachycardic.  Patient had a witnessed episode of bright red blood vomiting.  At that point patient became more lethargic.  Patient's daughter at bedside .  Patient and daughter both would like to be kept full code.  At that point made a decision with family aware to intubate patient due to concern for airway protection.  Patient given TXA ceftriaxone PPI bolus PPI drip in ED.  Patient intubated. Received fluids and started on pressors. GI following and plan to do EGD.        (25 Oct 2023 13:46)    MEDICATIONS  (STANDING):  benzocaine 20% Spray 1 Spray(s) Topical three times a day  cefTRIAXone   IVPB 1000 milliGRAM(s) IV Intermittent every 24 hours  chlorhexidine 2% Cloths 1 Application(s) Topical <User Schedule>  enoxaparin Injectable 70 milliGRAM(s) SubCutaneous every 12 hours  insulin lispro (ADMELOG) corrective regimen sliding scale   SubCutaneous three times a day before meals  lactulose Syrup 20 Gram(s) Oral every 6 hours  metoprolol tartrate 12.5 milliGRAM(s) Oral two times a day  metroNIDAZOLE  IVPB 500 milliGRAM(s) IV Intermittent every 8 hours  pantoprazole    Tablet 40 milliGRAM(s) Oral two times a day  polyethylene glycol 3350 17 Gram(s) Oral two times a day  QUEtiapine 50 milliGRAM(s) Oral at bedtime  senna 2 Tablet(s) Oral at bedtime    MEDICATIONS  (PRN):  acetaminophen     Tablet .. 650 milliGRAM(s) Oral every 6 hours PRN Moderate Pain (4 - 6)    INTERVAL EVENTS: Patient seen today, chart reviewed. Patient known to our service from [facility]. Patient downgraded today. PO intake poor, states food taste sour.    T(C): 37.1 (10-31-23 @ 12:00), Max: 37.6 (10-30-23 @ 20:00)  HR: 99 (10-31-23 @ 12:00) (82 - 117)  BP: 136/72 (10-31-23 @ 12:00) (108/54 - 171/70)  RR: 19 (10-31-23 @ 12:00) (14 - 22)  SpO2: 99% (10-31-23 @ 12:00) (95% - 100%)  Wt(kg): --Vital Signs Last 24 Hrs  T(C): 37.1 (31 Oct 2023 12:00), Max: 37.6 (30 Oct 2023 20:00)  T(F): 98.8 (31 Oct 2023 12:00), Max: 99.7 (30 Oct 2023 20:00)  HR: 99 (31 Oct 2023 12:00) (82 - 117)  BP: 136/72 (31 Oct 2023 12:00) (108/54 - 171/70)  BP(mean): 98 (31 Oct 2023 12:00) (77 - 102)  RR: 19 (31 Oct 2023 12:00) (14 - 22)  SpO2: 99% (31 Oct 2023 12:00) (95% - 100%)    Parameters below as of 31 Oct 2023 12:00  Patient On (Oxygen Delivery Method): room air        PHYSICAL EXAM:  GENERAL:   NECK: Supple, No JVD, Normal thyroid  CHEST/LUNG: Clear; No crackles or wheezing  HEART: S1, S2, Regular rate and rhythm;   ABDOMEN: Soft, Nontender, Nondistended; Bowel sounds present  EXTREMITIES: No clubbing, cyanosis, or edema  SKIN: No rashes or lesions    LABS:  Labs:                        8.4    9.31  )-----------( 226      ( 31 Oct 2023 04:52 )             26.3             10-31    148<H>  |  112<H>  |  12  ----------------------------<  162<H>  3.4<L>   |  27  |  0.7    Ca    8.5      31 Oct 2023 04:52  Phos  3.1     10-31  Mg     1.8     10-31    TPro  5.0<L>  /  Alb  2.8<L>  /  TBili  <0.2  /  DBili  x   /  AST  13  /  ALT  22  /  AlkPhos  51  10-31    LIVER FUNCTIONS - ( 31 Oct 2023 04:52 )  Alb: 2.8 g/dL / Pro: 5.0 g/dL / ALK PHOS: 51 U/L / ALT: 22 U/L / AST: 13 U/L / GGT: x                 PT/INR - ( 29 Oct 2023 23:35 )   PT: 14.10 sec;   INR: 1.23 ratio         PTT - ( 29 Oct 2023 23:35 )  PTT:25.3 sec      ABG - ( 29 Oct 2023 15:19 )  pH, Arterial: 7.38  pH, Blood: x     /  pCO2: 46    /  pO2: 163   / HCO3: 27    / Base Excess: 1.6   /  SaO2: 100.0             Urinalysis Basic - ( 31 Oct 2023 04:52 )    Color: x / Appearance: x / SG: x / pH: x  Gluc: 162 mg/dL / Ketone: x  / Bili: x / Urobili: x   Blood: x / Protein: x / Nitrite: x   Leuk Esterase: x / RBC: x / WBC x   Sq Epi: x / Non Sq Epi: x / Bacteria: x              RADIOLOGY & ADDITIONAL TESTS:    < from: Xray Chest 1 View- PORTABLE-Routine (10.30.23 @ 07:09) >    PROCEDURE DATE:  10/30/2023          INTERPRETATION:  CLINICAL HISTORY: CCU monitoring.    COMPARISON: 10/29/2023.    TECHNIQUE: Portable frontal chest radiograph.Adequate positioning.    FINDINGS:    Support devices: Left IJ central venous catheter is in stable position.    Cardiac/mediastinum/hilum: Stable.    Lung parenchyma/Pleura: No focal parenchymal opacities, pleural   effusions, or pneumothorax.    Skeleton/soft tissues: Stable.      IMPRESSION:    No focal parenchymal opacities, pleural effusions, or pneumothorax.    --- End of Report ---        < end of copied text >     CRISSY JAMES  88y  Female  HPI:    88-year-old female with a past medical history significant for diabetes, CVA, atrial fibrillation on Xarelto, HTN,  who presents due to concern for upper GI bleed. Per patient she had epigastric burning sensation this morning when she woke up. This was associated with nausea and multiple episodes of vomiting. Per daughter, patient vomited a cupful of bright red blood with clots (three times).  Patient in route was hemodynamically stable however when presented to the ED patient was noted to be hypotensive and tachycardic.  Patient had a witnessed episode of bright red blood vomiting.  At that point patient became more lethargic.  Patient's daughter at bedside .  Patient and daughter both would like to be kept full code.  At that point made a decision with family aware to intubate patient due to concern for airway protection.  Patient given TXA ceftriaxone PPI bolus PPI drip in ED.  Patient intubated. Received fluids and started on pressors. GI following and plan to do EGD.        (25 Oct 2023 13:46)    MEDICATIONS  (STANDING):  benzocaine 20% Spray 1 Spray(s) Topical three times a day  cefTRIAXone   IVPB 1000 milliGRAM(s) IV Intermittent every 24 hours  chlorhexidine 2% Cloths 1 Application(s) Topical <User Schedule>  enoxaparin Injectable 70 milliGRAM(s) SubCutaneous every 12 hours  insulin lispro (ADMELOG) corrective regimen sliding scale   SubCutaneous three times a day before meals  lactulose Syrup 20 Gram(s) Oral every 6 hours  metoprolol tartrate 12.5 milliGRAM(s) Oral two times a day  metroNIDAZOLE  IVPB 500 milliGRAM(s) IV Intermittent every 8 hours  pantoprazole    Tablet 40 milliGRAM(s) Oral two times a day  polyethylene glycol 3350 17 Gram(s) Oral two times a day  QUEtiapine 50 milliGRAM(s) Oral at bedtime  senna 2 Tablet(s) Oral at bedtime    MEDICATIONS  (PRN):  acetaminophen     Tablet .. 650 milliGRAM(s) Oral every 6 hours PRN Moderate Pain (4 - 6)    INTERVAL EVENTS: Patient seen today, chart reviewed. Patient known to our service from [facility]. Patient downgraded today. PO intake poor, states food taste sour.    T(C): 37.1 (10-31-23 @ 12:00), Max: 37.6 (10-30-23 @ 20:00)  HR: 99 (10-31-23 @ 12:00) (82 - 117)  BP: 136/72 (10-31-23 @ 12:00) (108/54 - 171/70)  RR: 19 (10-31-23 @ 12:00) (14 - 22)  SpO2: 99% (10-31-23 @ 12:00) (95% - 100%)  Wt(kg): --Vital Signs Last 24 Hrs  T(C): 37.1 (31 Oct 2023 12:00), Max: 37.6 (30 Oct 2023 20:00)  T(F): 98.8 (31 Oct 2023 12:00), Max: 99.7 (30 Oct 2023 20:00)  HR: 99 (31 Oct 2023 12:00) (82 - 117)  BP: 136/72 (31 Oct 2023 12:00) (108/54 - 171/70)  BP(mean): 98 (31 Oct 2023 12:00) (77 - 102)  RR: 19 (31 Oct 2023 12:00) (14 - 22)  SpO2: 99% (31 Oct 2023 12:00) (95% - 100%)    Parameters below as of 31 Oct 2023 12:00  Patient On (Oxygen Delivery Method): room air        PHYSICAL EXAM:  GENERAL: Awake, Hoarse  NECK: Supple, No JVD  CHEST/LUNG: Clear; Decreased effort  HEART: S1, S2, Regular  ABDOMEN: Soft, Nondistended; Bowel sounds present  EXTREMITIES: Trace edema      LABS:                        8.4    9.31  )-----------( 226      ( 31 Oct 2023 04:52 )             26.3             10-31    148<H>  |  112<H>  |  12  ----------------------------<  162<H>  3.4<L>   |  27  |  0.7    Ca    8.5      31 Oct 2023 04:52  Phos  3.1     10-31  Mg     1.8     10-31    TPro  5.0<L>  /  Alb  2.8<L>  /  TBili  <0.2  /  DBili  x   /  AST  13  /  ALT  22  /  AlkPhos  51  10-31    LIVER FUNCTIONS - ( 31 Oct 2023 04:52 )  Alb: 2.8 g/dL / Pro: 5.0 g/dL / ALK PHOS: 51 U/L / ALT: 22 U/L / AST: 13 U/L / GGT: x                   PT/INR - ( 29 Oct 2023 23:35 )   PT: 14.10 sec;   INR: 1.23 ratio         PTT - ( 29 Oct 2023 23:35 )  PTT:25.3 sec      ABG - ( 29 Oct 2023 15:19 )  pH, Arterial: 7.38  pH, Blood: x     /  pCO2: 46    /  pO2: 163   / HCO3: 27    / Base Excess: 1.6   /  SaO2: 100.0             Urinalysis Basic - ( 31 Oct 2023 04:52 )    Color: x / Appearance: x / SG: x / pH: x  Gluc: 162 mg/dL / Ketone: x  / Bili: x / Urobili: x   Blood: x / Protein: x / Nitrite: x   Leuk Esterase: x / RBC: x / WBC x   Sq Epi: x / Non Sq Epi: x / Bacteria: x              RADIOLOGY & ADDITIONAL TESTS:    < from: Xray Chest 1 View- PORTABLE-Routine (10.30.23 @ 07:09) >    PROCEDURE DATE:  10/30/2023          INTERPRETATION:  CLINICAL HISTORY: CCU monitoring.    COMPARISON: 10/29/2023.    TECHNIQUE: Portable frontal chest radiograph.Adequate positioning.    FINDINGS:    Support devices: Left IJ central venous catheter is in stable position.    Cardiac/mediastinum/hilum: Stable.    Lung parenchyma/Pleura: No focal parenchymal opacities, pleural   effusions, or pneumothorax.    Skeleton/soft tissues: Stable.      IMPRESSION:    No focal parenchymal opacities, pleural effusions, or pneumothorax.    --- End of Report ---        < end of copied text >

## 2023-10-31 NOTE — PROGRESS NOTE ADULT - ASSESSMENT
IMPRESSION:    Acute hypoxemic respiratory failure   UGIB    A. fib on xarelto   Element sepsis  HO CVA   Likely aspiration     PLAN:    CNS:  Seroquel for agitation/ QTc.     HEENT: Oral care.     PULMONARY: On room air. Aspiration precautions.     CARDIOVASCULAR: DC Levophed and midodrine. Metoprolol 12.5 mg BID.     GI: Protonix BID. Diet per speech and swallow. Gi follow up.    RENAL: No smith. Replete electrolytes as needed.     INFECTIOUS DISEASE: Urine culture: Klebsiella and proteus. Finish 7 day course of bax.     HEMATOLOGICAL:  Hgb stable for the past 3 days; switch lovenox to therapeutic.     ENDOCRINE:  Follow up FS.  Insulin protocol if needed.    MUSCULOSKELETAL: Seen by PT, unable to ambulate.     Full code.     Lines: DC left IJ TLC, no smith.     Medical floor.

## 2023-10-31 NOTE — PROGRESS NOTE ADULT - ASSESSMENT
Assessment and Plan  Case of an 88 year old female patient known to have DM, HTN, DL, CVA, and atrial fibrillation on Xarelto who was brought to the ED on 10/25 for evaluation of episodes of hematemesis, found to be hypotensive and tachycardic, admitted for further management. Stay complicated by recurrence of hematemesis requiring intubation for airway protection. We are consulted for concern of upper GI bleeding.      Hematemesis w/ Hemorrhagic shock secondary to upper GI bleed  Likely Secondary to Bleeding Gastric Dieulafoy Lesions Status Post 4 Endoclips 10/25  Acute Symptomatic Anemia  - weaned from pressors and the ventilator  - s/p 1 unit pRBC   - s/p Tranexamic acid  - Last dose of Xarelto (10/24)  - currently on Lovenox  - Family History: negative for GI malignancies  - Status post EGD 10/25: Normal mucosa in the whole esophagus. Erosive gastritis. Erythematous oozing lesions in the stomach body concerning for Dieulafoy lesions (endoclips x 4). A large amount of coffee ground material and blood was seen in the duodenal bulb and second part of duodenum. After irrigation, cleaning, and carefully examining under water, there was no evidence of active bleeding or ulcer. .  - Keep Hb >8. Active type and screen, 2 x 18 gauge IVs  - IV Protonix transitioned to 40mg PO BID  - Swallowing eval noted, diet started with thickened liquids > patient complaining of sour taste  - ENT evaluation noted  - consider EP eval for left atrial appendage closure eval  - avoid any NSAIDs  - If active bleeding with hemodynamic instability please obtain CTA and inform GI STAT    Acute Hypoxic Respiratory Failure  Component of Sepsis secondary to Aspiration Pneumonia  - weaned from vent on 10/29  - IV Zosyn transitioned to Rocephin and Flagyl  - currently stable on RA    A Fib with AVR  - Xarelto on hold  - on therapeutic Lovenox dose    Feed with assistance  Obtain rehab evaluation    Patient written transfer to the floor

## 2023-10-31 NOTE — PHYSICAL THERAPY INITIAL EVALUATION ADULT - IMPAIRED TRANSFERS: BED/CHAIR, REHAB EVAL
impaired balance/cognition/impaired coordination/decreased flexibility/impaired motor control/pain/decreased strength

## 2023-10-31 NOTE — PHYSICAL THERAPY INITIAL EVALUATION ADULT - GENERAL OBSERVATIONS, REHAB EVAL
7:55-8:20. chart reviewed. Pt received semi-apodaca at B/S, confused, oriented x1, slurred speech, able to follow simple instructions. + monitoring, +primafit, + IV, on room air, SpO2 98%-100%, VSS denies pain, B/L UE restraint, L sided weakness, L ankle inversion deformity, B/L hand flexion contractures (R>L), R LE 2/5. Pt dependent with overall functional mobility. Pt demonstrated baseline mobility. Pt is not candidate for PT at this time. Further PT referral PRN. L LE tone 3/5.

## 2023-10-31 NOTE — PHYSICAL THERAPY INITIAL EVALUATION ADULT - IMPAIRED TRANSFERS: SIT/STAND, REHAB EVAL
impaired balance/cognition/impaired coordination/decreased flexibility/impaired motor control/abnormal muscle tone/decreased strength

## 2023-10-31 NOTE — PHYSICAL THERAPY INITIAL EVALUATION ADULT - PERTINENT HX OF CURRENT PROBLEM, REHAB EVAL
Patient is a 89 y/o F with diabetes, CVA, afib (on Xarelto), HTN, presented for witnessed bright red blood vomiting. She was intubated for airway protection and admitted to CCU under pulmonary service.

## 2023-10-31 NOTE — PROGRESS NOTE ADULT - SUBJECTIVE AND OBJECTIVE BOX
Patient is a 88y old  Female who presents with a chief complaint of GIB (30 Oct 2023 09:23)        Over Night Events:    No issues   Afberile         ROS:  See HPI    PHYSICAL EXAM    ICU Vital Signs Last 24 Hrs  T(C): 36.9 (31 Oct 2023 08:00), Max: 37.6 (30 Oct 2023 20:00)  T(F): 98.4 (31 Oct 2023 08:00), Max: 99.7 (30 Oct 2023 20:00)  HR: 105 (31 Oct 2023 08:00) (82 - 113)  BP: 129/60 (31 Oct 2023 08:00) (94/51 - 171/70)  BP(mean): 86 (31 Oct 2023 08:00) (68 - 103)  ABP: --  ABP(mean): --  RR: 17 (31 Oct 2023 08:00) (15 - 34)  SpO2: 99% (31 Oct 2023 08:00) (94% - 100%)    O2 Parameters below as of 31 Oct 2023 08:00  Patient On (Oxygen Delivery Method): room air            General: NAD  HEENT: CLIFF             Lymphatic system: No cervical LN   Lungs: Bilateral BS; coarse  Cardiovascular: Regular   Gastrointestinal: Soft, Positive BS  Extremities: No clubbing.  Moves extremities.  Full Range of motion   Skin: Warm, intact  Neurological: No motor or sensory deficit       10-30-23 @ 07:01  -  10-31-23 @ 07:00  --------------------------------------------------------  IN:    Dexmedetomidine: 17.8 mL    IV PiggyBack: 150 mL    Lactated Ringers Bolus: 500 mL    Norepinephrine: 1 mL    Norepinephrine: 3 mL    Oral Fluid: 150 mL  Total IN: 821.8 mL    OUT:    Voided (mL): 750 mL  Total OUT: 750 mL    Total NET: 71.8 mL          LABS:                            8.4    9.31  )-----------( 226      ( 31 Oct 2023 04:52 )             26.3                                               10-31    148<H>  |  112<H>  |  12  ----------------------------<  162<H>  3.4<L>   |  27  |  0.7    Ca    8.5      31 Oct 2023 04:52  Phos  3.1     10-31  Mg     1.8     10-31    TPro  5.0<L>  /  Alb  2.8<L>  /  TBili  <0.2  /  DBili  x   /  AST  13  /  ALT  22  /  AlkPhos  51  10-31      PT/INR - ( 29 Oct 2023 23:35 )   PT: 14.10 sec;   INR: 1.23 ratio         PTT - ( 29 Oct 2023 23:35 )  PTT:25.3 sec                                       Urinalysis Basic - ( 31 Oct 2023 04:52 )    Color: x / Appearance: x / SG: x / pH: x  Gluc: 162 mg/dL / Ketone: x  / Bili: x / Urobili: x   Blood: x / Protein: x / Nitrite: x   Leuk Esterase: x / RBC: x / WBC x   Sq Epi: x / Non Sq Epi: x / Bacteria: x                                                  LIVER FUNCTIONS - ( 31 Oct 2023 04:52 )  Alb: 2.8 g/dL / Pro: 5.0 g/dL / ALK PHOS: 51 U/L / ALT: 22 U/L / AST: 13 U/L / GGT: x                                                                                                                                   ABG - ( 29 Oct 2023 15:19 )  pH, Arterial: 7.38  pH, Blood: x     /  pCO2: 46    /  pO2: 163   / HCO3: 27    / Base Excess: 1.6   /  SaO2: 100.0               MEDICATIONS  (STANDING):  benzocaine 20% Spray 1 Spray(s) Topical three times a day  cefTRIAXone   IVPB 1000 milliGRAM(s) IV Intermittent every 24 hours  chlorhexidine 2% Cloths 1 Application(s) Topical <User Schedule>  glucagon  Injectable 1 milliGRAM(s) IntraMuscular once  insulin lispro (ADMELOG) corrective regimen sliding scale   SubCutaneous three times a day before meals  lactated ringers. 1000 milliLiter(s) (75 mL/Hr) IV Continuous <Continuous>  metroNIDAZOLE  IVPB 500 milliGRAM(s) IV Intermittent every 8 hours  midodrine. 10 milliGRAM(s) Oral every 8 hours  norepinephrine Infusion 0.053 MICROgram(s)/kG/Min (3.52 mL/Hr) IV Continuous <Continuous>  pantoprazole  Injectable 40 milliGRAM(s) IV Push every 12 hours  potassium chloride  20 mEq/100 mL IVPB 20 milliEquivalent(s) IV Intermittent every 2 hours  QUEtiapine 50 milliGRAM(s) Oral at bedtime  senna 2 Tablet(s) Oral at bedtime    MEDICATIONS  (PRN):  acetaminophen     Tablet .. 650 milliGRAM(s) Oral every 6 hours PRN Moderate Pain (4 - 6)      Xrays:                                                                                     ECHO

## 2023-10-31 NOTE — CHART NOTE - NSCHARTNOTEFT_GEN_A_CORE
Transfer from: MICU  Transfer to: General med floor     HPI:  88-year-old female with a past medical history significant for diabetes, CVA, atrial fibrillation on Xarelto, HTN,  who presents due to concern for upper GI bleed. Per patient she had epigastric burning sensation this morning when she woke up. This was associated with nausea and multiple episodes of vomiting. Per daughter, patient vomited a cupful of bright red blood with clots (three times). Patient in route was hemodynamically stable however when presented to the ED patient was noted to be hypotensive and tachycardic.  Patient had a witnessed episode of bright red blood vomiting.  At that point patient became more lethargic.  Patient's daughter at bedside .  Patient and daughter both would like to be kept full code.  At that point made a decision with family aware to intubate patient due to concern for airway protection.      ED course:   Patient given TXA ceftriaxone PPI bolus PPI drip in ED.  Patient intubated. Received fluids and started on pressors.     Hospital course:   Seen by GI, bedside EGD on 10/25 demonstrated normal esophagus, no bleeding, large amount of blood clots in stomach. Three different areas of erythema concerning for dieulafoy lesions. Four endoclips placed. Large amount of coffee ground secretions seen in duodenal bulb and second part of duodenum, no active bleeding or ulcer. S/p 1U PRBC on 10/25. Continued on PPI IV BID. Seen by ENT to r/o posterior nasal bleed, no packing done as no signs of active bleeding. UCx growing Kleb pneumonia and proteus, started on Zosyn. Passed SAT but failed SBT x2 on 10/27. Extubated 10/29. Restarted on prophylactic lovenox on 10/30 with stable Hgb. Switched zoysn to CTX and flagyl, completed 7-day course on 10/31. Started on therapeutic lovenox on 10/31. Weaned off pressors and midodrine. Passed w/ S&S. Duplex of JARAD w/ Transfer from: MICU  Transfer to: General med floor     HPI:  88-year-old female with a past medical history significant for diabetes, CVA, atrial fibrillation on Xarelto, HTN,  who presents due to concern for upper GI bleed. Per patient she had epigastric burning sensation this morning when she woke up. This was associated with nausea and multiple episodes of vomiting. Per daughter, patient vomited a cupful of bright red blood with clots (three times). Patient in route was hemodynamically stable however when presented to the ED patient was noted to be hypotensive and tachycardic.  Patient had a witnessed episode of bright red blood vomiting.  At that point patient became more lethargic.  Patient's daughter at bedside .  Patient and daughter both would like to be kept full code.  At that point made a decision with family aware to intubate patient due to concern for airway protection.      ED course:   Patient given TXA ceftriaxone PPI bolus PPI drip in ED.  Patient intubated. Received fluids and started on pressors.     Hospital course:   Seen by GI, bedside EGD on 10/25 demonstrated normal esophagus, no bleeding, large amount of blood clots in stomach. Three different areas of erythema concerning for dieulafoy lesions. Four endoclips placed. Large amount of coffee ground secretions seen in duodenal bulb and second part of duodenum, no active bleeding or ulcer. S/p 1U PRBC on 10/25. Continued on PPI IV BID. Seen by ENT to r/o posterior nasal bleed, no packing done as no signs of active bleeding. UCx growing Kleb pneumonia and proteus, started on Zosyn. Passed SAT but failed SBT x2 on 10/27. Extubated 10/29. Restarted on prophylactic lovenox on 10/30 with stable Hgb. Switched zoysn to CTX and flagyl, completed 7-day course on 10/31. Started on therapeutic lovenox on 10/31. Weaned off pressors and midodrine. Passed w/ S&S. Duplex of RUE demonstrated cephalic vein SVT, no acute intervention necessary, pt on AC. Transitioned to PO PPI. Pt noted to be tachycardic to 130s on tele monitor, hx of afib, started on metoprolol 12.5mg BID. Stable for downgrade to floor.     -Follow-up   - 4pm CBC (monitor if Hb remains stable following initiation of therapeutic lovenox dose)    PLAN:    #element sepsis   #likely aspiration   - BCx NGTD, UCx growing Kleb pneumonia and proteus   - CXR w/ possible R infiltrate   - DC zoysn, switch to rocephin and flagyl (end 10/31 for total 7-days of abx)  - weaned off levophed and midodrine     #UGIB  - witnessed episode of vomiting BRB   - bedside EGD done 10/25: normal esophagus, no bleeding, large amount of blood clots in stomach, three different areas of erythema concerning for dieulafoy lesions; s/p four endoclips   - s/p 1U PRBC 10/25  - c/w protonix 40mg PO BID  - start therapeutic lovenox dose   - Hb stable around 8.2-8.3  - transfuse to keep Hb >8    #acute hypoxemic respiratory failure   - intubated 10/25   - failed SBT x2 on 10/27/23  - extubated on 10/29, saturating well on RA    - cleared by SS   - fentanyl and precedex DC'd, c/w seroquel 50mg QHS PRN for agitation (monitor daily QTc)    #afib on xarelto  #hx of CVA    - holding home AC, consider restarting if Hb remains stable on therapeutic lovenox dose   - start metoprolol 12.5mg BID     #DMT2  - monitor FS   - ISS if needed     #Misc   DVT ppx: Lovenox 40mg   GI ppx: Protonix 40mg PO BID   Diet: pureed diet, mildly thick liquids   Activity: IAT   Dispo: gen med floor Transfer from: MICU  Transfer to: General med floor     HPI:  88-year-old female with a past medical history significant for diabetes, CVA, atrial fibrillation on Xarelto, HTN,  who presents due to concern for upper GI bleed. Per patient she had epigastric burning sensation this morning when she woke up. This was associated with nausea and multiple episodes of vomiting. Per daughter, patient vomited a cupful of bright red blood with clots (three times). Patient in route was hemodynamically stable however when presented to the ED patient was noted to be hypotensive and tachycardic.  Patient had a witnessed episode of bright red blood vomiting.  At that point patient became more lethargic.  Patient's daughter at bedside .  Patient and daughter both would like to be kept full code.  At that point made a decision with family aware to intubate patient due to concern for airway protection.      ED course:   Patient given TXA ceftriaxone PPI bolus PPI drip in ED.  Patient intubated. Received fluids and started on pressors.     Hospital course:   Seen by GI, bedside EGD on 10/25 demonstrated normal esophagus, no bleeding, large amount of blood clots in stomach. Three different areas of erythema concerning for dieulafoy lesions. Four endoclips placed. Large amount of coffee ground secretions seen in duodenal bulb and second part of duodenum, no active bleeding or ulcer. S/p 1U PRBC on 10/25. Continued on PPI IV BID. Seen by ENT to r/o posterior nasal bleed, no packing done as no signs of active bleeding. UCx growing Kleb pneumonia and proteus, started on Zosyn. Passed SAT but failed SBT x2 on 10/27. Extubated 10/29. Restarted on prophylactic lovenox on 10/30 with stable Hgb. Switched zoysn to CTX and flagyl, completed 7-day course on 10/31. Started on therapeutic lovenox on 10/31. Weaned off pressors and midodrine. Passed w/ S&S. Duplex of RUE demonstrated cephalic vein SVT, no acute intervention necessary, pt on AC. Pt noted to be tachycardic to 130s on tele monitor, hx of afib, started on metoprolol 12.5mg BID. Stable for downgrade to floor.     -Follow-up   - 4pm CBC (monitor if Hb remains stable following initiation of therapeutic lovenox dose)    PLAN:    #element sepsis   #likely aspiration   - BCx NGTD, UCx growing Kleb pneumonia and proteus   - CXR w/ possible R infiltrate   - DC zoysn, switch to rocephin and flagyl (end 10/31 for total 7-days of abx)  - weaned off levophed and midodrine     #UGIB  - witnessed episode of vomiting BRB   - bedside EGD done 10/25: normal esophagus, no bleeding, large amount of blood clots in stomach, three different areas of erythema concerning for dieulafoy lesions; s/p four endoclips   - s/p 1U PRBC 10/25  - c/w protonix 40mg IV BID; switch to PO once patient can advance diet   - start therapeutic lovenox dose   - Hb stable around 8.2-8.3  - transfuse to keep Hb >8    #acute hypoxemic respiratory failure   - intubated 10/25   - failed SBT x2 on 10/27/23  - extubated on 10/29, saturating well on RA    - cleared by SS   - fentanyl and precedex DC'd, c/w seroquel 50mg QHS PRN for agitation (monitor daily QTc)    #afib on xarelto  #hx of CVA    - holding home AC, consider restarting if Hb remains stable on therapeutic lovenox dose   - start metoprolol 12.5mg BID     #DMT2  - monitor FS   - ISS if needed     #Misc   DVT ppx: Lovenox 40mg   GI ppx: Protonix 40mg PO BID   Diet: pureed diet, mildly thick liquids   Activity: IAT   Dispo: gen med floor

## 2023-11-01 LAB
ALBUMIN SERPL ELPH-MCNC: 3.8 G/DL — SIGNIFICANT CHANGE UP (ref 3.5–5.2)
ALBUMIN SERPL ELPH-MCNC: 3.8 G/DL — SIGNIFICANT CHANGE UP (ref 3.5–5.2)
ALP SERPL-CCNC: 67 U/L — SIGNIFICANT CHANGE UP (ref 30–115)
ALP SERPL-CCNC: 67 U/L — SIGNIFICANT CHANGE UP (ref 30–115)
ALT FLD-CCNC: 20 U/L — SIGNIFICANT CHANGE UP (ref 0–41)
ALT FLD-CCNC: 20 U/L — SIGNIFICANT CHANGE UP (ref 0–41)
ANION GAP SERPL CALC-SCNC: 16 MMOL/L — HIGH (ref 7–14)
ANION GAP SERPL CALC-SCNC: 16 MMOL/L — HIGH (ref 7–14)
AST SERPL-CCNC: 15 U/L — SIGNIFICANT CHANGE UP (ref 0–41)
AST SERPL-CCNC: 15 U/L — SIGNIFICANT CHANGE UP (ref 0–41)
BASOPHILS # BLD AUTO: 0.06 K/UL — SIGNIFICANT CHANGE UP (ref 0–0.2)
BASOPHILS # BLD AUTO: 0.06 K/UL — SIGNIFICANT CHANGE UP (ref 0–0.2)
BASOPHILS NFR BLD AUTO: 0.5 % — SIGNIFICANT CHANGE UP (ref 0–1)
BASOPHILS NFR BLD AUTO: 0.5 % — SIGNIFICANT CHANGE UP (ref 0–1)
BILIRUB SERPL-MCNC: 0.4 MG/DL — SIGNIFICANT CHANGE UP (ref 0.2–1.2)
BILIRUB SERPL-MCNC: 0.4 MG/DL — SIGNIFICANT CHANGE UP (ref 0.2–1.2)
BUN SERPL-MCNC: 6 MG/DL — LOW (ref 10–20)
BUN SERPL-MCNC: 6 MG/DL — LOW (ref 10–20)
CALCIUM SERPL-MCNC: 9.3 MG/DL — SIGNIFICANT CHANGE UP (ref 8.4–10.4)
CALCIUM SERPL-MCNC: 9.3 MG/DL — SIGNIFICANT CHANGE UP (ref 8.4–10.4)
CHLORIDE SERPL-SCNC: 104 MMOL/L — SIGNIFICANT CHANGE UP (ref 98–110)
CHLORIDE SERPL-SCNC: 104 MMOL/L — SIGNIFICANT CHANGE UP (ref 98–110)
CO2 SERPL-SCNC: 26 MMOL/L — SIGNIFICANT CHANGE UP (ref 17–32)
CO2 SERPL-SCNC: 26 MMOL/L — SIGNIFICANT CHANGE UP (ref 17–32)
CREAT SERPL-MCNC: 0.7 MG/DL — SIGNIFICANT CHANGE UP (ref 0.7–1.5)
CREAT SERPL-MCNC: 0.7 MG/DL — SIGNIFICANT CHANGE UP (ref 0.7–1.5)
EGFR: 84 ML/MIN/1.73M2 — SIGNIFICANT CHANGE UP
EGFR: 84 ML/MIN/1.73M2 — SIGNIFICANT CHANGE UP
EOSINOPHIL # BLD AUTO: 0.04 K/UL — SIGNIFICANT CHANGE UP (ref 0–0.7)
EOSINOPHIL # BLD AUTO: 0.04 K/UL — SIGNIFICANT CHANGE UP (ref 0–0.7)
EOSINOPHIL NFR BLD AUTO: 0.3 % — SIGNIFICANT CHANGE UP (ref 0–8)
EOSINOPHIL NFR BLD AUTO: 0.3 % — SIGNIFICANT CHANGE UP (ref 0–8)
GLUCOSE BLDC GLUCOMTR-MCNC: 179 MG/DL — HIGH (ref 70–99)
GLUCOSE BLDC GLUCOMTR-MCNC: 179 MG/DL — HIGH (ref 70–99)
GLUCOSE BLDC GLUCOMTR-MCNC: 185 MG/DL — HIGH (ref 70–99)
GLUCOSE BLDC GLUCOMTR-MCNC: 185 MG/DL — HIGH (ref 70–99)
GLUCOSE BLDC GLUCOMTR-MCNC: 197 MG/DL — HIGH (ref 70–99)
GLUCOSE BLDC GLUCOMTR-MCNC: 197 MG/DL — HIGH (ref 70–99)
GLUCOSE BLDC GLUCOMTR-MCNC: 213 MG/DL — HIGH (ref 70–99)
GLUCOSE BLDC GLUCOMTR-MCNC: 213 MG/DL — HIGH (ref 70–99)
GLUCOSE SERPL-MCNC: 211 MG/DL — HIGH (ref 70–99)
GLUCOSE SERPL-MCNC: 211 MG/DL — HIGH (ref 70–99)
HCT VFR BLD CALC: 35.6 % — LOW (ref 37–47)
HCT VFR BLD CALC: 35.6 % — LOW (ref 37–47)
HGB BLD-MCNC: 11.1 G/DL — LOW (ref 12–16)
HGB BLD-MCNC: 11.1 G/DL — LOW (ref 12–16)
IMM GRANULOCYTES NFR BLD AUTO: 0.2 % — SIGNIFICANT CHANGE UP (ref 0.1–0.3)
IMM GRANULOCYTES NFR BLD AUTO: 0.2 % — SIGNIFICANT CHANGE UP (ref 0.1–0.3)
LYMPHOCYTES # BLD AUTO: 1.72 K/UL — SIGNIFICANT CHANGE UP (ref 1.2–3.4)
LYMPHOCYTES # BLD AUTO: 1.72 K/UL — SIGNIFICANT CHANGE UP (ref 1.2–3.4)
LYMPHOCYTES # BLD AUTO: 13.7 % — LOW (ref 20.5–51.1)
LYMPHOCYTES # BLD AUTO: 13.7 % — LOW (ref 20.5–51.1)
MAGNESIUM SERPL-MCNC: 2 MG/DL — SIGNIFICANT CHANGE UP (ref 1.8–2.4)
MAGNESIUM SERPL-MCNC: 2 MG/DL — SIGNIFICANT CHANGE UP (ref 1.8–2.4)
MCHC RBC-ENTMCNC: 27.9 PG — SIGNIFICANT CHANGE UP (ref 27–31)
MCHC RBC-ENTMCNC: 27.9 PG — SIGNIFICANT CHANGE UP (ref 27–31)
MCHC RBC-ENTMCNC: 31.2 G/DL — LOW (ref 32–37)
MCHC RBC-ENTMCNC: 31.2 G/DL — LOW (ref 32–37)
MCV RBC AUTO: 89.4 FL — SIGNIFICANT CHANGE UP (ref 81–99)
MCV RBC AUTO: 89.4 FL — SIGNIFICANT CHANGE UP (ref 81–99)
MONOCYTES # BLD AUTO: 1.18 K/UL — HIGH (ref 0.1–0.6)
MONOCYTES # BLD AUTO: 1.18 K/UL — HIGH (ref 0.1–0.6)
MONOCYTES NFR BLD AUTO: 9.4 % — HIGH (ref 1.7–9.3)
MONOCYTES NFR BLD AUTO: 9.4 % — HIGH (ref 1.7–9.3)
NEUTROPHILS # BLD AUTO: 9.57 K/UL — HIGH (ref 1.4–6.5)
NEUTROPHILS # BLD AUTO: 9.57 K/UL — HIGH (ref 1.4–6.5)
NEUTROPHILS NFR BLD AUTO: 75.9 % — HIGH (ref 42.2–75.2)
NEUTROPHILS NFR BLD AUTO: 75.9 % — HIGH (ref 42.2–75.2)
NRBC # BLD: 0 /100 WBCS — SIGNIFICANT CHANGE UP (ref 0–0)
NRBC # BLD: 0 /100 WBCS — SIGNIFICANT CHANGE UP (ref 0–0)
PHOSPHATE SERPL-MCNC: 2.3 MG/DL — SIGNIFICANT CHANGE UP (ref 2.1–4.9)
PHOSPHATE SERPL-MCNC: 2.3 MG/DL — SIGNIFICANT CHANGE UP (ref 2.1–4.9)
PLATELET # BLD AUTO: 354 K/UL — SIGNIFICANT CHANGE UP (ref 130–400)
PLATELET # BLD AUTO: 354 K/UL — SIGNIFICANT CHANGE UP (ref 130–400)
PMV BLD: 10.6 FL — HIGH (ref 7.4–10.4)
PMV BLD: 10.6 FL — HIGH (ref 7.4–10.4)
POTASSIUM SERPL-MCNC: 3.9 MMOL/L — SIGNIFICANT CHANGE UP (ref 3.5–5)
POTASSIUM SERPL-MCNC: 3.9 MMOL/L — SIGNIFICANT CHANGE UP (ref 3.5–5)
POTASSIUM SERPL-SCNC: 3.9 MMOL/L — SIGNIFICANT CHANGE UP (ref 3.5–5)
POTASSIUM SERPL-SCNC: 3.9 MMOL/L — SIGNIFICANT CHANGE UP (ref 3.5–5)
PROT SERPL-MCNC: 6.9 G/DL — SIGNIFICANT CHANGE UP (ref 6–8)
PROT SERPL-MCNC: 6.9 G/DL — SIGNIFICANT CHANGE UP (ref 6–8)
RBC # BLD: 3.98 M/UL — LOW (ref 4.2–5.4)
RBC # BLD: 3.98 M/UL — LOW (ref 4.2–5.4)
RBC # FLD: 16.2 % — HIGH (ref 11.5–14.5)
RBC # FLD: 16.2 % — HIGH (ref 11.5–14.5)
SODIUM SERPL-SCNC: 146 MMOL/L — SIGNIFICANT CHANGE UP (ref 135–146)
SODIUM SERPL-SCNC: 146 MMOL/L — SIGNIFICANT CHANGE UP (ref 135–146)
WBC # BLD: 12.6 K/UL — HIGH (ref 4.8–10.8)
WBC # BLD: 12.6 K/UL — HIGH (ref 4.8–10.8)
WBC # FLD AUTO: 12.6 K/UL — HIGH (ref 4.8–10.8)
WBC # FLD AUTO: 12.6 K/UL — HIGH (ref 4.8–10.8)

## 2023-11-01 PROCEDURE — 93010 ELECTROCARDIOGRAM REPORT: CPT

## 2023-11-01 PROCEDURE — 74018 RADEX ABDOMEN 1 VIEW: CPT | Mod: 26

## 2023-11-01 PROCEDURE — 99232 SBSQ HOSP IP/OBS MODERATE 35: CPT | Mod: GC

## 2023-11-01 PROCEDURE — 99223 1ST HOSP IP/OBS HIGH 75: CPT

## 2023-11-01 RX ORDER — METOPROLOL TARTRATE 50 MG
50 TABLET ORAL
Refills: 0 | Status: DISCONTINUED | OUTPATIENT
Start: 2023-11-01 | End: 2023-11-02

## 2023-11-01 RX ORDER — LOSARTAN POTASSIUM 100 MG/1
25 TABLET, FILM COATED ORAL DAILY
Refills: 0 | Status: DISCONTINUED | OUTPATIENT
Start: 2023-11-01 | End: 2023-11-05

## 2023-11-01 RX ORDER — NIFEDIPINE 30 MG
60 TABLET, EXTENDED RELEASE 24 HR ORAL ONCE
Refills: 0 | Status: COMPLETED | OUTPATIENT
Start: 2023-11-01 | End: 2023-11-01

## 2023-11-01 RX ADMIN — Medication 60 MILLIGRAM(S): at 08:49

## 2023-11-01 RX ADMIN — POLYETHYLENE GLYCOL 3350 17 GRAM(S): 17 POWDER, FOR SOLUTION ORAL at 06:25

## 2023-11-01 RX ADMIN — Medication 1: at 18:38

## 2023-11-01 RX ADMIN — PANTOPRAZOLE SODIUM 40 MILLIGRAM(S): 20 TABLET, DELAYED RELEASE ORAL at 18:39

## 2023-11-01 RX ADMIN — Medication 100 MILLIGRAM(S): at 06:24

## 2023-11-01 RX ADMIN — SENNA PLUS 2 TABLET(S): 8.6 TABLET ORAL at 21:17

## 2023-11-01 RX ADMIN — Medication 50 MILLIGRAM(S): at 18:39

## 2023-11-01 RX ADMIN — LACTULOSE 20 GRAM(S): 10 SOLUTION ORAL at 06:24

## 2023-11-01 RX ADMIN — Medication 12.5 MILLIGRAM(S): at 06:24

## 2023-11-01 RX ADMIN — CHLORHEXIDINE GLUCONATE 1 APPLICATION(S): 213 SOLUTION TOPICAL at 06:15

## 2023-11-01 RX ADMIN — LACTULOSE 20 GRAM(S): 10 SOLUTION ORAL at 00:20

## 2023-11-01 RX ADMIN — Medication 2: at 08:48

## 2023-11-01 RX ADMIN — ENOXAPARIN SODIUM 70 MILLIGRAM(S): 100 INJECTION SUBCUTANEOUS at 06:24

## 2023-11-01 RX ADMIN — QUETIAPINE FUMARATE 50 MILLIGRAM(S): 200 TABLET, FILM COATED ORAL at 21:16

## 2023-11-01 RX ADMIN — PANTOPRAZOLE SODIUM 40 MILLIGRAM(S): 20 TABLET, DELAYED RELEASE ORAL at 06:24

## 2023-11-01 RX ADMIN — LACTULOSE 20 GRAM(S): 10 SOLUTION ORAL at 18:39

## 2023-11-01 RX ADMIN — ENOXAPARIN SODIUM 70 MILLIGRAM(S): 100 INJECTION SUBCUTANEOUS at 18:38

## 2023-11-01 RX ADMIN — Medication 1 SPRAY(S): at 06:16

## 2023-11-01 NOTE — PROGRESS NOTE ADULT - SUBJECTIVE AND OBJECTIVE BOX
Chart reviewed, patient examined. Pertinent results reviewed.  Case discussed with HO; specialist f/u reviewed  HD#8; post EGD D#7; out of CCU D#1  JAMES WOODWARD    HPI:    88-year-old female with a past medical history significant for diabetes, CVA, atrial fibrillation on Xarelto, HTN,  who [facility]. Per patient she had epigastric burning sensation the morning on DOA. This was associated with nausea and multiple episodes of vomiting. Per daughter, patient vomited a cupful of bright red blood with clots (three times).  Patient in route was hemodynamically stable however when presented to the ED patient was noted to be hypotensive and tachycardic.  Patient had a witnessed episode of bright red blood vomiting.  At that point patient became more lethargic.  Patient's daughter at bedside .  Patient and daughter both would like to be kept full code.  At that point made a decision with family aware to intubate patient due to concern for airway protection.  Patient given TXA ceftriaxone PPI bolus PPI drip in ED.  Patient intubated. Received fluids and started on pressors. GI following did emergent EGD w multiple successful clips of gastric erosions.   She was also noted to have a UTI which was Rx'd.      INTERVAL EVENTS: Patient seen today, chart reviewed. Patient known to our service from [facility]. Patient downgraded yesterday PO intake poor, states food taste sour.    MEDICATIONS  (STANDING):  benzocaine 20% Spray 1 Spray(s) Topical three times a day  chlorhexidine 2% Cloths 1 Application(s) Topical <User Schedule>  enoxaparin Injectable 70 milliGRAM(s) SubCutaneous every 12 hours  insulin lispro (ADMELOG) corrective regimen sliding scale   SubCutaneous three times a day before meals  lactulose Syrup 20 Gram(s) Oral every 6 hours  losartan 25 milliGRAM(s) Oral daily  metoprolol tartrate 50 milliGRAM(s) Oral two times a day  pantoprazole   Suspension 40 milliGRAM(s) Oral two times a day  polyethylene glycol 3350 17 Gram(s) Oral two times a day  QUEtiapine 50 milliGRAM(s) Oral at bedtime  senna 2 Tablet(s) Oral at bedtime    MEDICATIONS  (PRN):  acetaminophen     Tablet .. 650 milliGRAM(s) Oral every 6 hours PRN Moderate Pain (4 - 6)      Vital Signs Last 24 Hrs  T(C): 36.1 (01 Nov 2023 04:00), Max: 37.2 (31 Oct 2023 16:00)  T(F): 97 (01 Nov 2023 04:00), Max: 99 (31 Oct 2023 16:00)  HR: 119 (01 Nov 2023 04:00) (94 - 119)  BP: 180/102 (01 Nov 2023 04:00) (136/72 - 180/102)  BP(mean): 109 (31 Oct 2023 20:00) (86 - 123)  RR: 27 (31 Oct 2023 20:00) (15 - 27)  SpO2: 100% (01 Nov 2023 04:00) (98% - 100%)    Parameters below as of 31 Oct 2023 18:00  Patient On (Oxygen Delivery Method): room air      PHYSICAL EXAM:  GENERAL: [address];  Hoarse voice+; year-'1919'  NECK: Supple, No JVD  CHEST/LUNG: Clear  HEART: RR w ectopy; no MRG  ABDOMEN: obese, Soft, Nondistended; Bowel sounds present  EXTREMITIES: Trace edema; contractures in L side.   : primafit      LABS:                               11.1   12.60 )-----------( 354      ( 01 Nov 2023 04:30 )             35.6                  8.4    9.31  )-----------( 226      ( 31 Oct 2023 04:52 )             26.3     11-01    146  |  104  |  6<L>  ----------------------------<  211<H>  3.9   |  26  |  0.7    Ca    9.3      01 Nov 2023 04:30  Phos  2.3     11-01  Mg     2.0     11-01    TPro  6.9  /  Alb  3.8  /  TBili  0.4  /  DBili  x   /  AST  15  /  ALT  20  /  AlkPhos  67  11-01              10-31    148<H>  |  112<H>  |  12  ----------------------------<  162<H>  3.4<L>   |  27  |  0.7    Ca    8.5      31 Oct 2023 04:52  Phos  3.1     10-31  Mg     1.8     10-31    TPro  5.0<L>  /  Alb  2.8<L>  /  TBili  <0.2  /  DBili  x   /  AST  13  /  ALT  22  /  AlkPhos  51  10-31    LIVER FUNCTIONS - ( 31 Oct 2023 04:52 )  Alb: 2.8 g/dL / Pro: 5.0 g/dL / ALK PHOS: 51 U/L / ALT: 22 U/L / AST: 13 U/L / GGT: x                   PT/INR - ( 29 Oct 2023 23:35 )   PT: 14.10 sec;   INR: 1.23 ratio         PTT - ( 29 Oct 2023 23:35 )  PTT:25.3 sec      ABG - ( 29 Oct 2023 15:19 )  pH, Arterial: 7.38  pH, Blood: x     /  pCO2: 46    /  pO2: 163   / HCO3: 27    / Base Excess: 1.6   /  SaO2: 100.0             Urinalysis Basic - ( 31 Oct 2023 04:52 )    Color: x / Appearance: x / SG: x / pH: x  Gluc: 162 mg/dL / Ketone: x  / Bili: x / Urobili: x   Blood: x / Protein: x / Nitrite: x   Leuk Esterase: x / RBC: x / WBC x   Sq Epi: x / Non Sq Epi: x / Bacteria: x              RADIOLOGY & ADDITIONAL TESTS:    < from: Xray Chest 1 View- PORTABLE-Routine (10.30.23 @ 07:09) >    PROCEDURE DATE:  10/30/2023          INTERPRETATION:  CLINICAL HISTORY: CCU monitoring.    COMPARISON: 10/29/2023.    TECHNIQUE: Portable frontal chest radiograph.Adequate positioning.    FINDINGS:    Support devices: Left IJ central venous catheter is in stable position.    Cardiac/mediastinum/hilum: Stable.    Lung parenchyma/Pleura: No focal parenchymal opacities, pleural   effusions, or pneumothorax.    Skeleton/soft tissues: Stable.      IMPRESSION:    No focal parenchymal opacities, pleural effusions, or pneumothorax.    --- End of Report ---        < end of copied text >      < from: TTE Echo Complete w/o Contrast w/ Doppler (10.26.23 @ 09:22) >  ACC: [id number] EXAM:  ECHO TTE WO CON COMP W DOPP                          PROCEDURE DATE:  10/26/2023          INTERPRETATION:   [address] [address]                Phone: [phone].   TRANSTHORACIC ECHOCARDIOGRAM REPORT        Patient Name:   JAMES WOODWARD Accession #: [id number]  Medical Rec #:  [id number]     Height:      65.0 in 165.1 cm  YOB: 1935     Weight:      156.0 lb 70.76 kg  Patient Age:    88 years      BSA:         1.78 m²  Patient Gender: F             BP:          135/65 mmHg      Date of Exam:        10/26/2023 9:22:37 AM  Referring Physician: LAURA BOO  Sonographer:         Lissy Garibay  Reading Physician:Charbel Betancourt.    Procedure:     2D Echo/Doppler/Color Doppler Complete and Echocardiogram   with                 Definity Contrast.  Indications:   R06.00 - Dyspnea, unspecified  Diagnosis:     R06.00 - Dyspnea, unspecified  Study Details: Technicallylimited study. Study quality was adversely   affected                 due to body habitus.        Summary:   1. This was a technically difficult and limited study:   2. Hyperdynamic global left ventricular systolic function.   3. LV Ejection Fractionby Watkins's Method with a biplane EF of 74 %.   4. The left ventricular diastolic function could not be assessed in this   study.   5. Normal right ventricular size and function.   6. There is no evidence of pericardial effusion.   7. Sclerotic aortic valve with decreased opening.   8. Mild tricuspid regurgitation.   9. Estimated pulmonary artery systolic pressure is 47.0 mmHg assuming a   right atrial pressure of 15 mmHg, which is consistent with mild pulmonary   hypertension.    PHYSICIAN INTERPRETATION:  Left Ventricle: Global LV systolic function was hyperdynamic. The left   ventricular diastolic function could not be assessed in this study.  There is no LV apical thrombus.  Right Ventricle: Normal right ventricular size and function.  Left Atrium: Left atrial size not well visualized.  Right Atrium: Right atrial size not well visualized.  Pericardium: There is no evidence of pericardial effusion.  Tricuspid Valve: Mild tricuspid regurgitation is visualized. Estimated   pulmonary artery systolic pressure is 47.0 mmHg assuming a right atrial   pressure of 15 mmHg, which is consistent with mild pulmonary hypertension.  Aortic Valve: The aortic valve is trileaflet. Sclerotic aortic valve with   decreased opening. No evidence of aortic valve regurgitation is seen.   There is no hemodynamically significant aortic stenosis.  Pulmonic Valve: The pulmonic valve was not well visualized.  Aorta: The aorta was not well visualized.  The ascending aorta was not well visualized.      < end of copied text >      EGD reviewed

## 2023-11-01 NOTE — CONSULT NOTE ADULT - SUBJECTIVE AND OBJECTIVE BOX
Outpt cardiologist:    Reason for Consult:     HISTORY OF PRESENT ILLNESS:    88-year-old female with a past medical history significant for diabetes, CVA, atrial fibrillation on Xarelto, HTN,  who presents due to concern for upper GI bleed. Per patient she had epigastric burning sensation this morning when she woke up. This was associated with nausea and multiple episodes of vomiting. Per daughter, patient vomited a cupful of bright red blood with clots (three times).  Patient in route was hemodynamically stable however when presented to the ED patient was noted to be hypotensive and tachycardic.  Patient had a witnessed episode of bright red blood vomiting.  At that point patient became more lethargic.  Patient's daughter at bedside .  Patient and daughter both would like to be kept full code.  At that point made a decision with family aware to intubate patient due to concern for airway protection.  Patient given TXA ceftriaxone PPI bolus PPI drip in ED.  Patient intubated. Received fluids and started on pressors. GI following and plan to do EGD.        (25 Oct 2023 13:46)      Cardiology Fellow Notes    Significant Cardiovascular History:    Notable Comorbidities:     HPI:    Interval Events:     Previous Cardiac Workup    Risk Factors:      PAST MEDICAL & SURGICAL HISTORY  Diabetes    Hypertension    Atrial fibrillation    Cerebrovascular accident (CVA)        FAMILY HISTORY:  FAMILY HISTORY:      SOCIAL HISTORY:  Social History:      ALLERGIES:  No Known Allergies      MEDICATIONS:  benzocaine 20% Spray 1 Spray(s) Topical three times a day  chlorhexidine 2% Cloths 1 Application(s) Topical <User Schedule>  enoxaparin Injectable 70 milliGRAM(s) SubCutaneous every 12 hours  insulin lispro (ADMELOG) corrective regimen sliding scale   SubCutaneous three times a day before meals  lactulose Syrup 20 Gram(s) Oral every 6 hours  losartan 25 milliGRAM(s) Oral daily  metoprolol tartrate 50 milliGRAM(s) Oral two times a day  pantoprazole   Suspension 40 milliGRAM(s) Oral two times a day  polyethylene glycol 3350 17 Gram(s) Oral two times a day  QUEtiapine 50 milliGRAM(s) Oral at bedtime  senna 2 Tablet(s) Oral at bedtime    PRN:  acetaminophen     Tablet .. 650 milliGRAM(s) Oral every 6 hours PRN      HOME MEDICATIONS:  Home Medications:  aluminum hydroxide/magnesium hydroxide/simethicone 200 mg-225 mg-20 mg/5 mL oral suspension: 20 milliliter(s) orally 2 times a day as needed for dyspepsia (25 Oct 2023 16:04)  BuSpar 5 mg oral tablet: 1 tab(s) orally 2 times a day (25 Oct 2023 16:04)  Depakote 125 mg oral delayed release tablet: 1 tab(s) orally 3 times a day (25 Oct 2023 16:04)  famotidine 20 mg oral tablet: 1 tab(s) orally 2 times a day (25 Oct 2023 16:04)  LORazepam 0.5 mg oral tablet: 0.5 tab(s) orally 2 times a day (25 Oct 2023 16:04)  metFORMIN 500 mg oral tablet: 1 tab(s) orally 2 times a day (25 Oct 2023 15:58)  MiraLax oral powder for reconstitution: 17 each orally once a day (25 Oct 2023 16:05)  Neurontin 100 mg oral capsule: 2 cap(s) orally 3 times a day (25 Oct 2023 15:57)  Senna Plus 50 mg-8.6 mg oral tablet: 2 tab(s) orally once a day (at bedtime) (25 Oct 2023 15:57)  traMADol 50 mg oral tablet: 1 tab(s) orally 2 times a day (25 Oct 2023 16:04)  Tylenol 325 mg oral tablet: 2 tab(s) orally every 6 hours as needed for  mild pain (25 Oct 2023 16:04)  Vitamin D3 1250 mcg (50,000 intl units) oral capsule: 1 cap(s) orally once a month (25 Oct 2023 16:04)  Xarelto 10 mg oral tablet: 1 tab(s) orally once a day (25 Oct 2023 15:57)      VITALS:   T(F): 97 (11-01 @ 04:00), Max: 99.7 (10-30 @ 20:00)  HR: 119 (11-01 @ 04:00) (69 - 128)  BP: 180/102 (11-01 @ 04:00) (60/37 - 180/102)  BP(mean): 109 (10-31 @ 20:00) (45 - 123)  RR: 27 (10-31 @ 20:00) (9 - 42)  SpO2: 100% (11-01 @ 04:00) (94% - 100%)    I&O's Summary    31 Oct 2023 07:01  -  01 Nov 2023 07:00  --------------------------------------------------------  IN: 550 mL / OUT: 1650 mL / NET: -1100 mL        REVIEW OF SYSTEMS:  CONSTITUTIONAL: No weakness, fevers or chills  HEENT: No visual changes, neck/ear pain  RESPIRATORY: No cough, sob  CARDIOVASCULAR: See HPI  GASTROINTESTINAL: No abdominal pain. No nausea, vomiting, diarrhea   GENITOURINARY: No dysuria, frequency or hematuria  NEUROLOGICAL: No new focal deficits  SKIN: No new rashes    PHYSICAL EXAM:  *General: Not in distress.  Non-toxic appearing.   *HEENT: EOMI  *Cardio: regular, S1, S2, no murmur  *Pulm: B/L BS.  No wheezing / crackles / rales  *Abdomen: Soft, non-tender, non-distended. Normoactive bowel sounds  *Extremities: No edema b/l le  *Neuro: A&O x3. No focal deficits    LABS:                        11.1   12.60 )-----------( 354      ( 01 Nov 2023 04:30 )             35.6     11-01    146  |  104  |  6<L>  ----------------------------<  211<H>  3.9   |  26  |  0.7    Ca    9.3      01 Nov 2023 04:30  Phos  2.3     11-01  Mg     2.0     11-01    TPro  6.9  /  Alb  3.8  /  TBili  0.4  /  DBili  x   /  AST  15  /  ALT  20  /  AlkPhos  67  11-01              Troponin trend:            IMAGING:  - CXR:  - TTE:  - CT:   - CCTA:  - STRESS TEST:  - CATHETERIZATION:  - MRI:   - Ultrasound:     ECG:      TELEMETRY EVENTS: Reason for Consult: ELADIO    HISTORY OF PRESENT ILLNESS:    88-year-old female with a past medical history significant for diabetes, CVA, atrial fibrillation on Xarelto, HTN,  who presents due to concern for upper GI bleed. Per patient she had epigastric burning sensation this morning when she woke up. This was associated with nausea and multiple episodes of vomiting. Per daughter, patient vomited a cupful of bright red blood with clots (three times).  Patient in route was hemodynamically stable however when presented to the ED patient was noted to be hypotensive and tachycardic.  Patient had a witnessed episode of bright red blood vomiting.  At that point patient became more lethargic.  Patient's daughter at bedside .  Patient and daughter both would like to be kept full code.  At that point made a decision with family aware to intubate patient due to concern for airway protection.  Patient given TXA ceftriaxone PPI bolus PPI drip in ED.  Patient intubated. Received fluids and started on pressors. GI following and plan to do EGD.        (25 Oct 2023 13:46)      Cardiology Fellow Notes    Significant Cardiovascular History:  - A fib on Xarelto   - CVA    Notable Comorbidities:   - DM  - HTN    HPI:  - Presented with GI bleed complicated by AHRF suspected secondary to aspiration pneumonia   She was intubated. EGD done showed clots and dieulafoy lesions which were cautherized   She was then extubated   Received 1 PRBC total     Cleared to resume AC by GI     PAST MEDICAL & SURGICAL HISTORY  Diabetes    Hypertension    Atrial fibrillation    Cerebrovascular accident (CVA)        FAMILY HISTORY:  FAMILY HISTORY:      SOCIAL HISTORY:  Social History:      ALLERGIES:  No Known Allergies      MEDICATIONS:  benzocaine 20% Spray 1 Spray(s) Topical three times a day  chlorhexidine 2% Cloths 1 Application(s) Topical <User Schedule>  enoxaparin Injectable 70 milliGRAM(s) SubCutaneous every 12 hours  insulin lispro (ADMELOG) corrective regimen sliding scale   SubCutaneous three times a day before meals  lactulose Syrup 20 Gram(s) Oral every 6 hours  losartan 25 milliGRAM(s) Oral daily  metoprolol tartrate 50 milliGRAM(s) Oral two times a day  pantoprazole   Suspension 40 milliGRAM(s) Oral two times a day  polyethylene glycol 3350 17 Gram(s) Oral two times a day  QUEtiapine 50 milliGRAM(s) Oral at bedtime  senna 2 Tablet(s) Oral at bedtime    PRN:  acetaminophen     Tablet .. 650 milliGRAM(s) Oral every 6 hours PRN      HOME MEDICATIONS:  Home Medications:  aluminum hydroxide/magnesium hydroxide/simethicone 200 mg-225 mg-20 mg/5 mL oral suspension: 20 milliliter(s) orally 2 times a day as needed for dyspepsia (25 Oct 2023 16:04)  BuSpar 5 mg oral tablet: 1 tab(s) orally 2 times a day (25 Oct 2023 16:04)  Depakote 125 mg oral delayed release tablet: 1 tab(s) orally 3 times a day (25 Oct 2023 16:04)  famotidine 20 mg oral tablet: 1 tab(s) orally 2 times a day (25 Oct 2023 16:04)  LORazepam 0.5 mg oral tablet: 0.5 tab(s) orally 2 times a day (25 Oct 2023 16:04)  metFORMIN 500 mg oral tablet: 1 tab(s) orally 2 times a day (25 Oct 2023 15:58)  MiraLax oral powder for reconstitution: 17 each orally once a day (25 Oct 2023 16:05)  Neurontin 100 mg oral capsule: 2 cap(s) orally 3 times a day (25 Oct 2023 15:57)  Senna Plus 50 mg-8.6 mg oral tablet: 2 tab(s) orally once a day (at bedtime) (25 Oct 2023 15:57)  traMADol 50 mg oral tablet: 1 tab(s) orally 2 times a day (25 Oct 2023 16:04)  Tylenol 325 mg oral tablet: 2 tab(s) orally every 6 hours as needed for  mild pain (25 Oct 2023 16:04)  Vitamin D3 1250 mcg (50,000 intl units) oral capsule: 1 cap(s) orally once a month (25 Oct 2023 16:04)  Xarelto 10 mg oral tablet: 1 tab(s) orally once a day (25 Oct 2023 15:57)      VITALS:   T(F): 97 (11-01 @ 04:00), Max: 99.7 (10-30 @ 20:00)  HR: 119 (11-01 @ 04:00) (69 - 128)  BP: 180/102 (11-01 @ 04:00) (60/37 - 180/102)  BP(mean): 109 (10-31 @ 20:00) (45 - 123)  RR: 27 (10-31 @ 20:00) (9 - 42)  SpO2: 100% (11-01 @ 04:00) (94% - 100%)    I&O's Summary    31 Oct 2023 07:01  -  01 Nov 2023 07:00  --------------------------------------------------------  IN: 550 mL / OUT: 1650 mL / NET: -1100 mL        REVIEW OF SYSTEMS:  CONSTITUTIONAL: No weakness, fevers or chills  HEENT: No visual changes, neck/ear pain  RESPIRATORY: No cough, sob  CARDIOVASCULAR: See HPI  GASTROINTESTINAL: No abdominal pain. No nausea, vomiting, diarrhea   GENITOURINARY: No dysuria, frequency or hematuria  NEUROLOGICAL: No new focal deficits  SKIN: No new rashes    PHYSICAL EXAM:  *General: Not in distress.  Non-toxic appearing.   *Cardio: regular, S1, S2, no murmur  *Pulm: B/L BS.  No wheezing / crackles / rales  *Abdomen: Soft, non-tender, non-distended. Normoactive bowel sounds  *Extremities: No edema b/l le. Contracted hands.   *Neuro: A&O x3. No focal deficits    LABS:                        11.1   12.60 )-----------( 354      ( 01 Nov 2023 04:30 )             35.6     11-01    146  |  104  |  6<L>  ----------------------------<  211<H>  3.9   |  26  |  0.7    Ca    9.3      01 Nov 2023 04:30  Phos  2.3     11-01  Mg     2.0     11-01    TPro  6.9  /  Alb  3.8  /  TBili  0.4  /  DBili  x   /  AST  15  /  ALT  20  /  AlkPhos  67  11-01    Troponin trend:      IMAGING:  - TTE:  < from: TTE Echo Complete w/o Contrast w/ Doppler (10.26.23 @ 09:22) >  Summary:   1. This was a technically difficult and limited study:   2. Hyperdynamic global left ventricular systolic function.   3. LV Ejection Fractionby Watkins's Method with a biplane EF of 74 %.   4. The left ventricular diastolic function could not be assessed in this   study.   5. Normal right ventricular size and function.   6. There is no evidence of pericardial effusion.   7. Sclerotic aortic valve with decreased opening.   8. Mild tricuspid regurgitation.   9. Estimated pulmonary artery systolic pressure is 47.0 mmHg assuming a   right atrial pressure of 15 mmHg, which is consistent with mild pulmonary   hypertension.

## 2023-11-01 NOTE — PROGRESS NOTE ADULT - ASSESSMENT
Assessment and Plan  Case of an 88 year old female patient known to have DM, HTN, DL, CVA, and atrial fibrillation on Xarelto who was brought to the ED on 10/25 for evaluation of episodes of hematemesis, found to be hypotensive and tachycardic, admitted for further management. Stay complicated by recurrence of hematemesis requiring intubation for airway protection. We are consulted for concern of upper GI bleeding.      Hematemesis w/ Hemorrhagic shock secondary to upper GI bleed  Likely Secondary to Bleeding Gastric Dieulafoy Lesions Status Post 4 Endoclips 10/25  Acute Symptomatic Anemia  - weaned from pressors and the ventilator  - s/p 1 unit pRBC   - s/p Tranexamic acid  - Last dose of Xarelto (10/24)  - currently on Lovenox  - Family History: negative for GI malignancies  - Status post EGD 10/25: Normal mucosa in the whole esophagus. Erosive gastritis. Erythematous oozing lesions in the stomach body concerning for Dieulafoy lesions (endoclips x 4). A large amount of coffee ground material and blood was seen in the duodenal bulb and second part of duodenum. After irrigation, cleaning, and carefully examining under water, there was no evidence of active bleeding or ulcer. .  - Keep Hb >8. Active type and screen, 2 x 18 gauge IVs  - IV Protonix transitioned to 40mg PO BID  - Swallowing eval noted, diet started with thickened liquids > patient complaining of sour taste  - ENT evaluation noted  - consider EP eval for left atrial appendage closure eval- consult is pending;  - avoid any NSAIDs  - If active bleeding with hemodynamic instability please obtain CTA and inform GI STAT  - for restrteing full AC- get GI clearance.    Acute Hypoxic Respiratory Failure  Component of Sepsis secondary to Aspiration Pneumonia  - weaned from vent on 10/29  - IV Zosyn transitioned to Rocephin and Flagyl  - currently stable on RA; stable  - see hypernatremia- correct w water or small amounts of d5w.     A Fib with AVR  - Xarelto on hold  - on therapeutic Lovenox dose- get GI to clear  - EP for watchman eval'n.    Feed with assistance  Obtain rehab evaluation    Patient now on the floor; DC planning after EP sees

## 2023-11-01 NOTE — CONSULT NOTE ADULT - ASSESSMENT
Impression   # A fib now in NSR   # GI bleed s/p EGD and cauterization     Recommendations   - Resume AC when cleared by GI   - Continue PPI with AC   - Continue rate control   - She would benefit from LAAO which can be done as outpatient     Incomplete note. This a preliminary plan. Will need to discuss with attending.   Signature: Heather MORALES, 2nd year cardiovascular diseases fellow   Impression   # A fib now in NSR   # GI bleed s/p EGD and cauterization   # Dementia and worsening functional status per the daughter     Recommendations   - Resume AC when cleared by GI and monitor for bleeding   - Continue PPI with AC   - Continue rate control   - If on AC no recurrent bleeding, will hold off on LAAO procedure given age and poor functional status, as risks may outweigh the benefits     Incomplete note. This a preliminary plan. Will need to discuss with attending.   Signature: Heather MORALES, 2nd year cardiovascular diseases fellow   Impression   # A fib now in NSR   # GI bleed s/p EGD and cauterization   # Dementia and worsening functional status per the daughter     Recommendations   - Resume AC when cleared by GI and monitor for bleeding   - Continue PPI with AC   - Continue rate control   - Will evaluate for LAAO procedure on outpatient basis     Discussed with attending.   Signature: Heather MORALES, 2nd year cardiovascular diseases fellow

## 2023-11-01 NOTE — PROGRESS NOTE ADULT - SUBJECTIVE AND OBJECTIVE BOX
SUBJECTIVE:  HPI:    88-year-old female with a past medical history significant for diabetes, CVA, atrial fibrillation on Xarelto, HTN,  who presents due to concern for upper GI bleed. Per patient she had epigastric burning sensation this morning when she woke up. This was associated with nausea and multiple episodes of vomiting. Per daughter, patient vomited a cupful of bright red blood with clots (three times).  Patient in route was hemodynamically stable however when presented to the ED patient was noted to be hypotensive and tachycardic.  Patient had a witnessed episode of bright red blood vomiting.  At that point patient became more lethargic.  Patient's daughter at bedside .  Patient and daughter both would like to be kept full code.  At that point made a decision with family aware to intubate patient due to concern for airway protection.  Patient given TXA ceftriaxone PPI bolus PPI drip in ED.  Patient intubated. Received fluids and started on pressors. GI following and plan to do EGD.        (25 Oct 2023 13:46)      Patient is a 88y old Female who presents with a chief complaint of GIB (01 Nov 2023 11:34)    Currently admitted to medicine with the primary diagnosis of Upper GI bleed       Today is hospital day 7d.     PAST MEDICAL & SURGICAL HISTORY  Diabetes    Hypertension    Atrial fibrillation    Cerebrovascular accident (CVA)        ALLERGIES:  No Known Allergies    MEDICATIONS:  ACTIVE MEDICATIONS  acetaminophen     Tablet .. 650 milliGRAM(s) Oral every 6 hours PRN  benzocaine 20% Spray 1 Spray(s) Topical three times a day  chlorhexidine 2% Cloths 1 Application(s) Topical <User Schedule>  enoxaparin Injectable 70 milliGRAM(s) SubCutaneous every 12 hours  insulin lispro (ADMELOG) corrective regimen sliding scale   SubCutaneous three times a day before meals  lactulose Syrup 20 Gram(s) Oral every 6 hours  losartan 25 milliGRAM(s) Oral daily  metoprolol tartrate 50 milliGRAM(s) Oral two times a day  pantoprazole   Suspension 40 milliGRAM(s) Oral two times a day  polyethylene glycol 3350 17 Gram(s) Oral two times a day  QUEtiapine 50 milliGRAM(s) Oral at bedtime  senna 2 Tablet(s) Oral at bedtime      VITALS:   T(F): 98  HR: 111  BP: 142/76  RR: 27  SpO2: 100%    LABS:                        11.1   12.60 )-----------( 354      ( 01 Nov 2023 04:30 )             35.6     11-01    146  |  104  |  6<L>  ----------------------------<  211<H>  3.9   |  26  |  0.7    Ca    9.3      01 Nov 2023 04:30  Phos  2.3     11-01  Mg     2.0     11-01    TPro  6.9  /  Alb  3.8  /  TBili  0.4  /  DBili  x   /  AST  15  /  ALT  20  /  AlkPhos  67  11-01      Urinalysis Basic - ( 01 Nov 2023 04:30 )    Color: x / Appearance: x / SG: x / pH: x  Gluc: 211 mg/dL / Ketone: x  / Bili: x / Urobili: x   Blood: x / Protein: x / Nitrite: x   Leuk Esterase: x / RBC: x / WBC x   Sq Epi: x / Non Sq Epi: x / Bacteria: x                    PHYSICAL EXAM:  GEN: No acute distress  LUNGS: Clear to auscultation bilaterally   HEART: S1/S2 present.    ABD: Soft, non-tender, non-distended.   EXT: No pedal edema  NEURO: AAOX3

## 2023-11-01 NOTE — CONSULT NOTE ADULT - ATTENDING COMMENTS
Agree with above.  Patient seen and examined, history taken from daughter as patient was intubated and sedated.  #Hemorrhagic shock secondary to upper GI bleed  ICU care, IV PPI drip for every 12 hours, hold Xarelto, supportive care per primary team, Hb q8h and transfuse PRN to keep Hb > 8.  Recommended PRBC transfusion with at least 1 unit and fluid resuscitation prior to planned EGD today.  Give erythromycin 250 milligram IV once now and Reglan 10 mg IV 30 mins before the procedure.  Further recommendations to follow EGD.    Time-based billing (NON-critical care).   75 minutes spent on total encounter; more than 50% of the visit was spent counseling and / or coordinating care by the attending physician.  The necessity of the time spent during the encounter on this date of service was due to: Coordination of care.
Ms. Lan was seen and examined in the ICU today, the patient presented with hematemesis on Eliquis as an outpatient.  For this, the patient was intubated in the emergency department due to failure to protect her airway.  The patient appears to have had no further bleeding since that time, however her hemoglobin remained stable and the patient is now in shock requiring moderately high doses of Levophed.  Recommend transitioning sedation away from propofol once EGD is completed, and transfusing as needed to decrease her Levophed requirements.  After GI procedure is complete, we will also obtain CT of the abdomen/pelvis with IV contrast to evaluate for possible bowel ischemia.  Repeat ABG to assess for vent change requirements.  Maintain 2x large bore IV access, PPI BID, keep NPO, maintain active type & screen, transfuse to maintain Hgb > 7.  Check CBC every 8 hours for now.  Resuscitation with crystalloid fluid as needed.  I agree with the fellow note, with the exceptions listed in my attestation above.  The remainder of impression and plan per fellow note.
agree with above

## 2023-11-01 NOTE — PROGRESS NOTE ADULT - ATTENDING COMMENTS
I edited the note.   Time-based billing (NON-critical care).   50 minutes spent on total encounter; more than 50% of the visit was spent counseling and / or coordinating care by the attending physician.  The necessity of the time spent during the encounter on this date of service was due to: Coordination of care.
I edited the note.   Time-based billing (NON-critical care).   50 minutes spent on total encounter; more than 50% of the visit was spent counseling and / or coordinating care by the attending physician.  The necessity of the time spent during the encounter on this date of service was due to: Coordination of care.
Discussed with resident. Pt had a BM overnight. Hb stable. Transfuse if Hb <7. Pt has afib and on therapeutic lovenox. CHADSVASC 6 so EP recommended by GI for watchman evaluation which they suggested as OP. Increased lopressor to 25mg bid for better HR control. repeat EKG since pt is on seroquel and qtc is 470. Abx completed. No further hematemesis and currently on ppi bid. Pt is AOx1 at baseline with plan to go to [place].

## 2023-11-01 NOTE — PROGRESS NOTE ADULT - ASSESSMENT
#UGIB  - witnessed episode of vomiting BRB   - bedside EGD done 10/25: normal esophagus, no bleeding, large amount of blood clots in stomach, three different areas of erythema concerning for dieulafoy lesions; s/p four endoclips   - s/p 1U PRBC 10/25  - c/w protonix 40mg PO BID   - start therapeutic lovenox dose   - Hb stable around 11  - transfuse to keep Hb >8    Plan:  -monitor Hb, on therapeutic lovenox cleared by GI    #Sepsis (resolved)  #likely urosepsis  - BCx NGTD, UCx growing Kleb pneumonia and proteus   - CXR w/ possible R infiltrate   - DC zoysn, switch to rocephin and flagyl (end 10/31 for total 7-days of abx)  - weaned off levophed and midodrine   Plan:  Monitor vitals      #afib on xarelto  #hx of CVA    - lovenox  - started metoprolol 12.5mg BID, rate uncontrolled  Plan:  increase metoprolol to 50mg BID  EP: risk>benefit for watchman unless patient rebleeds on AC    #HTN  - not on home meds, started losartan     #Constipation  -BM on 11/1    #acute hypoxemic respiratory failure (resolved)  - intubated 10/25   - failed SBT x2 on 10/27/23  - extubated on 10/29, saturating well on RA    - cleared by SS   - fentanyl and precedex DC'd, c/w seroquel 50mg QHS PRN for agitation (monitor daily QTc)      #DMT2  - monitor FS   - ISS if needed     Pending: monitor Hb, heart rate control, blood pressure control    #Misc     GI ppx: Protonix 40mg PO BID   Diet: pureed diet, mildly thick liquids   Activity: IAT   Dispo: gen med floor.

## 2023-11-01 NOTE — CHART NOTE - NSCHARTNOTEFT_GEN_A_CORE
GI NUTRITION SUPPORT TEAM  -  PROGRESS NOTE     Interval Events:    Awake, alert, confused, speech garbled  Lunch at bedside, untouched. Attempted to feed but refused and refused for RN earlier as well  No further GI bleeding  +BM    REVIEW OF SYSTEMS:  Negative except as noted above.     VITALS:  T(F): 98 (11-01 @ 12:39), Max: 98 (11-01 @ 12:39)  HR: 111 (11-01 @ 12:39) (111 - 119)  BP: 142/76 (11-01 @ 12:39) (142/76 - 180/102)  RR: --  SpO2: 100% (11-01 @ 04:00) (100% - 100%)    HEIGHT/WEIGHT/BMI:   Height (cm): 165.1 (10-25)  Weight (kg): 71 (10-25)  BMI (kg/m2): 26 (10-25)    I/Os:     10-31-23 @ 07:01  -  11-01-23 @ 07:00  --------------------------------------------------------  IN:    IV PiggyBack: 400 mL    Oral Fluid: 150 mL  Total IN: 550 mL    OUT:    Voided (mL): 1650 mL  Total OUT: 1650 mL    Total NET: -1100 mL    MEDICATIONS:   acetaminophen     Tablet .. 650 milliGRAM(s) Oral every 6 hours PRN  benzocaine 20% Spray 1 Spray(s) Topical three times a day  chlorhexidine 2% Cloths 1 Application(s) Topical <User Schedule>  enoxaparin Injectable 70 milliGRAM(s) SubCutaneous every 12 hours  insulin lispro (ADMELOG) corrective regimen sliding scale   SubCutaneous three times a day before meals  lactulose Syrup 20 Gram(s) Oral every 6 hours  losartan 25 milliGRAM(s) Oral daily  metoprolol tartrate 50 milliGRAM(s) Oral two times a day  pantoprazole   Suspension 40 milliGRAM(s) Oral two times a day  polyethylene glycol 3350 17 Gram(s) Oral two times a day  QUEtiapine 50 milliGRAM(s) Oral at bedtime  senna 2 Tablet(s) Oral at bedtime    LABS:                         11.1   12.60 )-----------( 354      ( 01 Nov 2023 04:30 )             35.6     146  |  104  |  6<L>  ----------------------------<  211<H>          (11-01-23 @ 04:30)  3.9   |  26  |  0.7    Ca    9.3          (11-01-23 @ 04:30)  Phos  2.3         (11-01-23 @ 04:30)  Mg     2.0         (11-01-23 @ 04:30)    TPro  6.9  /  Alb  3.8  /  TBili  0.4  /  DBili  x   /  AST  15  /  ALT  20  /  AlkPhos  67       11-01-23 @ 04:30      Blood Glucose (Past 24 hours):  185 mg/dL (11-01 @ 12:56)  213 mg/dL (11-01 @ 08:10)  150 mg/dL (10-31 @ 17:24)    DIET:   Diet, Pureed:   Mildly Thick Liquids (MILDTHICKLIQS) (10-30-23 @ 11:52) [Active]      ASSESSMENT  88 year old female patient known to have DM, HTN, DL, CVA, and atrial fibrillation on Xarelto who was brought to the ED on 10/25 for evaluation of episodes of hematemesis, found to be hypotensive and tachycardic, admitted for further management. Stay complicated by recurrence of hematemesis requiring intubation for airway protection.    - Hematemesis w/ Hemorrhagic shock secondary to upper GI bleed  - Likely Secondary to Bleeding Gastric Dieulafoy Lesions Status Post 4 Endoclips 10/25  - Acute on Chronic Anemia  - hypokalemia       PLAN  - PO diet as tolerated, consistency per speech. Add CHO consistent heart healthy dietary modifications  - add magic cup 4oz q12hrs   - consider placing NGT for feeding, hydration, meds if cont to refuse to eat  - consider appetite stimulant  - glycemic control   - will follow

## 2023-11-02 LAB
GLUCOSE BLDC GLUCOMTR-MCNC: 151 MG/DL — HIGH (ref 70–99)
GLUCOSE BLDC GLUCOMTR-MCNC: 151 MG/DL — HIGH (ref 70–99)
GLUCOSE BLDC GLUCOMTR-MCNC: 197 MG/DL — HIGH (ref 70–99)
GLUCOSE BLDC GLUCOMTR-MCNC: 197 MG/DL — HIGH (ref 70–99)
GLUCOSE BLDC GLUCOMTR-MCNC: 238 MG/DL — HIGH (ref 70–99)
GLUCOSE BLDC GLUCOMTR-MCNC: 238 MG/DL — HIGH (ref 70–99)
GLUCOSE BLDC GLUCOMTR-MCNC: 262 MG/DL — HIGH (ref 70–99)
GLUCOSE BLDC GLUCOMTR-MCNC: 262 MG/DL — HIGH (ref 70–99)

## 2023-11-02 RX ORDER — METOPROLOL TARTRATE 50 MG
12.5 TABLET ORAL
Refills: 0 | Status: DISCONTINUED | OUTPATIENT
Start: 2023-11-02 | End: 2023-11-05

## 2023-11-02 RX ORDER — SODIUM CHLORIDE 9 MG/ML
1000 INJECTION, SOLUTION INTRAVENOUS
Refills: 0 | Status: DISCONTINUED | OUTPATIENT
Start: 2023-11-02 | End: 2023-11-05

## 2023-11-02 RX ORDER — RIVAROXABAN 15 MG-20MG
20 KIT ORAL
Refills: 0 | Status: DISCONTINUED | OUTPATIENT
Start: 2023-11-02 | End: 2023-11-04

## 2023-11-02 RX ADMIN — Medication 650 MILLIGRAM(S): at 10:00

## 2023-11-02 RX ADMIN — Medication 50 MILLIGRAM(S): at 06:46

## 2023-11-02 RX ADMIN — CHLORHEXIDINE GLUCONATE 1 APPLICATION(S): 213 SOLUTION TOPICAL at 06:55

## 2023-11-02 RX ADMIN — LACTULOSE 20 GRAM(S): 10 SOLUTION ORAL at 06:46

## 2023-11-02 RX ADMIN — LOSARTAN POTASSIUM 25 MILLIGRAM(S): 100 TABLET, FILM COATED ORAL at 06:55

## 2023-11-02 RX ADMIN — Medication 1: at 08:30

## 2023-11-02 RX ADMIN — PANTOPRAZOLE SODIUM 40 MILLIGRAM(S): 20 TABLET, DELAYED RELEASE ORAL at 06:47

## 2023-11-02 RX ADMIN — Medication 650 MILLIGRAM(S): at 22:32

## 2023-11-02 RX ADMIN — Medication 1: at 17:03

## 2023-11-02 RX ADMIN — Medication 650 MILLIGRAM(S): at 21:24

## 2023-11-02 RX ADMIN — Medication 2: at 11:52

## 2023-11-02 RX ADMIN — SODIUM CHLORIDE 65 MILLILITER(S): 9 INJECTION, SOLUTION INTRAVENOUS at 10:58

## 2023-11-02 RX ADMIN — Medication 650 MILLIGRAM(S): at 10:34

## 2023-11-02 RX ADMIN — SENNA PLUS 2 TABLET(S): 8.6 TABLET ORAL at 21:15

## 2023-11-02 RX ADMIN — QUETIAPINE FUMARATE 50 MILLIGRAM(S): 200 TABLET, FILM COATED ORAL at 21:15

## 2023-11-02 RX ADMIN — POLYETHYLENE GLYCOL 3350 17 GRAM(S): 17 POWDER, FOR SOLUTION ORAL at 06:46

## 2023-11-02 RX ADMIN — ENOXAPARIN SODIUM 70 MILLIGRAM(S): 100 INJECTION SUBCUTANEOUS at 06:46

## 2023-11-02 RX ADMIN — LACTULOSE 20 GRAM(S): 10 SOLUTION ORAL at 11:49

## 2023-11-02 RX ADMIN — Medication 1 SPRAY(S): at 21:14

## 2023-11-02 NOTE — PROGRESS NOTE ADULT - SUBJECTIVE AND OBJECTIVE BOX
CRISSY JAMES  88y  Female  HPI:    88-year-old female with a past medical history significant for diabetes, CVA, atrial fibrillation on Xarelto, HTN,  who presents due to concern for upper GI bleed. Per patient she had epigastric burning sensation this morning when she woke up. This was associated with nausea and multiple episodes of vomiting. Per daughter, patient vomited a cupful of bright red blood with clots (three times).  Patient in route was hemodynamically stable however when presented to the ED patient was noted to be hypotensive and tachycardic.  Patient had a witnessed episode of bright red blood vomiting.  At that point patient became more lethargic.  Patient's daughter at bedside .  Patient and daughter both would like to be kept full code.  At that point made a decision with family aware to intubate patient due to concern for airway protection.  Patient given TXA ceftriaxone PPI bolus PPI drip in ED.  Patient intubated. Received fluids and started on pressors. GI following and plan to do EGD.        (25 Oct 2023 13:46)    MEDICATIONS  (STANDING):  benzocaine 20% Spray 1 Spray(s) Topical three times a day  chlorhexidine 2% Cloths 1 Application(s) Topical <User Schedule>  insulin lispro (ADMELOG) corrective regimen sliding scale   SubCutaneous three times a day before meals  lactated ringers. 1000 milliLiter(s) (65 mL/Hr) IV Continuous <Continuous>  lactulose Syrup 20 Gram(s) Oral every 6 hours  losartan 25 milliGRAM(s) Oral daily  metoprolol tartrate 12.5 milliGRAM(s) Oral two times a day  pantoprazole   Suspension 40 milliGRAM(s) Oral two times a day  polyethylene glycol 3350 17 Gram(s) Oral two times a day  QUEtiapine 50 milliGRAM(s) Oral at bedtime  rivaroxaban 20 milliGRAM(s) Oral with dinner  senna 2 Tablet(s) Oral at bedtime    MEDICATIONS  (PRN):  acetaminophen     Tablet .. 650 milliGRAM(s) Oral every 6 hours PRN Moderate Pain (4 - 6)    INTERVAL EVENTS: Patient seen today without distress, still with poor PO intake, complains of headache    T(C): 36.4 (11-02-23 @ 06:00), Max: 36.6 (11-01-23 @ 21:48)  HR: 121 (11-02-23 @ 10:47) (99 - 121)  BP: 119/64 (11-02-23 @ 10:47) (119/64 - 164/100)  RR: 18 (11-01-23 @ 21:48) (18 - 18)  SpO2: --  Wt(kg): --Vital Signs Last 24 Hrs  T(C): 36.4 (02 Nov 2023 06:00), Max: 36.6 (01 Nov 2023 21:48)  T(F): 97.5 (02 Nov 2023 06:00), Max: 97.8 (01 Nov 2023 21:48)  HR: 121 (02 Nov 2023 10:47) (99 - 121)  BP: 119/64 (02 Nov 2023 10:47) (119/64 - 164/100)  BP(mean): --  RR: 18 (01 Nov 2023 21:48) (18 - 18)  SpO2: --        PHYSICAL EXAM:  GENERAL: NAD  NECK: Supple, No JVD  CHEST/LUNG: Clear  HEART: S1, S2, Tachycardic  ABDOMEN: Soft, Nontender, Nondistended; Bowel sounds present  EXTREMITIES: No clubbing, cyanosis, or edema  SKIN: No rashes or lesions    LABS:  Labs:                        11.1   12.60 )-----------( 354      ( 01 Nov 2023 04:30 )             35.6             11-01    146  |  104  |  6<L>  ----------------------------<  211<H>  3.9   |  26  |  0.7    Ca    9.3      01 Nov 2023 04:30  Phos  2.3     11-01  Mg     2.0     11-01    TPro  6.9  /  Alb  3.8  /  TBili  0.4  /  DBili  x   /  AST  15  /  ALT  20  /  AlkPhos  67  11-01    LIVER FUNCTIONS - ( 01 Nov 2023 04:30 )  Alb: 3.8 g/dL / Pro: 6.9 g/dL / ALK PHOS: 67 U/L / ALT: 20 U/L / AST: 15 U/L / GGT: x                         Urinalysis Basic - ( 01 Nov 2023 04:30 )    Color: x / Appearance: x / SG: x / pH: x  Gluc: 211 mg/dL / Ketone: x  / Bili: x / Urobili: x   Blood: x / Protein: x / Nitrite: x   Leuk Esterase: x / RBC: x / WBC x   Sq Epi: x / Non Sq Epi: x / Bacteria: x              RADIOLOGY & ADDITIONAL TESTS:     CRISSY JAMES  88y  Female  HPI:    88-year-old female with a past medical history significant for diabetes, CVA, atrial fibrillation on Xarelto, HTN,  who presents due to concern for upper GI bleed. Per patient she had epigastric burning sensation this morning when she woke up. This was associated with nausea and multiple episodes of vomiting. Per daughter, patient vomited a cupful of bright red blood with clots (three times).  Patient in route was hemodynamically stable however when presented to the ED patient was noted to be hypotensive and tachycardic.  Patient had a witnessed episode of bright red blood vomiting.  At that point patient became more lethargic.  Patient's daughter at bedside .  Patient and daughter both would like to be kept full code.  At that point made a decision with family aware to intubate patient due to concern for airway protection.  Patient given TXA ceftriaxone PPI bolus PPI drip in ED.  Patient intubated. Received fluids and started on pressors. GI following and plan to do EGD.        (25 Oct 2023 13:46)    MEDICATIONS  (STANDING):  benzocaine 20% Spray 1 Spray(s) Topical three times a day  chlorhexidine 2% Cloths 1 Application(s) Topical <User Schedule>  insulin lispro (ADMELOG) corrective regimen sliding scale   SubCutaneous three times a day before meals  lactated ringers. 1000 milliLiter(s) (65 mL/Hr) IV Continuous <Continuous>  lactulose Syrup 20 Gram(s) Oral every 6 hours  losartan 25 milliGRAM(s) Oral daily  metoprolol tartrate 12.5 milliGRAM(s) Oral two times a day  pantoprazole   Suspension 40 milliGRAM(s) Oral two times a day  polyethylene glycol 3350 17 Gram(s) Oral two times a day  QUEtiapine 50 milliGRAM(s) Oral at bedtime  rivaroxaban 20 milliGRAM(s) Oral with dinner  senna 2 Tablet(s) Oral at bedtime    MEDICATIONS  (PRN):  acetaminophen     Tablet .. 650 milliGRAM(s) Oral every 6 hours PRN Moderate Pain (4 - 6)    INTERVAL EVENTS: Patient seen today without distress, still with poor PO intake, complains of headache    T(C): 36.4 (11-02-23 @ 06:00), Max: 36.6 (11-01-23 @ 21:48)  HR: 121 (11-02-23 @ 10:47) (99 - 121)  BP: 119/64 (11-02-23 @ 10:47) (119/64 - 164/100)  RR: 18 (11-01-23 @ 21:48) (18 - 18)  SpO2: --  Wt(kg): --Vital Signs Last 24 Hrs  T(C): 36.4 (02 Nov 2023 06:00), Max: 36.6 (01 Nov 2023 21:48)  T(F): 97.5 (02 Nov 2023 06:00), Max: 97.8 (01 Nov 2023 21:48)  HR: 121 (02 Nov 2023 10:47) (99 - 121)  BP: 119/64 (02 Nov 2023 10:47) (119/64 - 164/100)  BP(mean): --  RR: 18 (01 Nov 2023 21:48) (18 - 18)  SpO2: --        PHYSICAL EXAM:  GENERAL: NAD  NECK: Supple, No JVD  CHEST/LUNG: Clear  HEART: S1, S2, Tachycardic  ABDOMEN: Soft, Nontender, Nondistended; Bowel sounds present  EXTREMITIES: No clubbing, cyanosis, or edema  SKIN: No rashes or lesions    LABS:                          11.1   12.60 )-----------( 354      ( 01 Nov 2023 04:30 )             35.6             11-01    146  |  104  |  6<L>  ----------------------------<  211<H>  3.9   |  26  |  0.7    Ca    9.3      01 Nov 2023 04:30  Phos  2.3     11-01  Mg     2.0     11-01    TPro  6.9  /  Alb  3.8  /  TBili  0.4  /  DBili  x   /  AST  15  /  ALT  20  /  AlkPhos  67  11-01    LIVER FUNCTIONS - ( 01 Nov 2023 04:30 )  Alb: 3.8 g/dL / Pro: 6.9 g/dL / ALK PHOS: 67 U/L / ALT: 20 U/L / AST: 15 U/L / GGT: x                         Urinalysis Basic - ( 01 Nov 2023 04:30 )    Color: x / Appearance: x / SG: x / pH: x  Gluc: 211 mg/dL / Ketone: x  / Bili: x / Urobili: x   Blood: x / Protein: x / Nitrite: x   Leuk Esterase: x / RBC: x / WBC x   Sq Epi: x / Non Sq Epi: x / Bacteria: x              RADIOLOGY & ADDITIONAL TESTS:

## 2023-11-02 NOTE — PROGRESS NOTE ADULT - ASSESSMENT
Case of an 88 year old female patient known to have DM, HTN, DL, CVA, and atrial fibrillation on Xarelto who was brought to the ED on 10/25 for evaluation of episodes of hematemesis, found to be hypotensive and tachycardic, admitted for further management. Stay complicated by recurrence of hematemesis requiring intubation for airway protection. We are consulted for concern of upper GI bleeding.      #UGIB  - witnessed episode of vomiting BRB   - bedside EGD done 10/25: normal esophagus, no bleeding, large amount of blood clots in stomach, three different areas of erythema concerning for dieulafoy lesions; s/p four endoclips   - s/p 1U PRBC 10/25  - c/w protonix 40mg PO BID   - start therapeutic lovenox dose   - Hb stable around 11  - transfuse to keep Hb >8    Plan:  -monitor Hb, on therapeutic lovenox cleared by GI    #Decreased oral intake  -started LR  -S and S: pureed with mild thick liquids    #Sepsis (resolved)  #likely urosepsis  - BCx NGTD, UCx growing Kleb pneumonia and proteus   - CXR w/ possible R infiltrate   - DC zoysn, switch to rocephin and flagyl (end 10/31 for total 7-days of abx)  - weaned off levophed and midodrine   Plan:  Monitor vitals      #afib on xarelto  #hx of CVA    - lovenox  - started metoprolol 12.5mg BID, rate uncontrolled  Plan:  IV fluids as patient is clinically dehydrated  EP: risk>benefit for watchman unless patient rebleeds on AC    #HTN  - not on home meds, started losartan     #Constipation  -BM on 11/1    #acute hypoxemic respiratory failure (resolved)  - intubated 10/25   - failed SBT x2 on 10/27/23  - extubated on 10/29, saturating well on RA    - cleared by SS   - fentanyl and precedex DC'd, c/w seroquel 50mg QHS PRN for agitation (monitor daily QTc)      #DMT2  - monitor FS   - ISS if needed     Pending: monitor Hb, heart rate control, blood pressure control    #Misc     GI ppx: Protonix 40mg PO BID   Diet: pureed diet, mildly thick liquids   Activity: IAT   Dispo: gen med floor.

## 2023-11-02 NOTE — PROGRESS NOTE ADULT - ASSESSMENT
Assessment and Plan  Case of an 88 year old female patient known to have DM, HTN, DL, CVA, and atrial fibrillation on Xarelto who was brought to the ED on 10/25 for evaluation of episodes of hematemesis, found to be hypotensive and tachycardic, admitted for further management. Stay complicated by recurrence of hematemesis requiring intubation for airway protection. We are consulted for concern of upper GI bleeding.      Hematemesis w/ Hemorrhagic shock secondary to upper GI bleed  Likely Secondary to Bleeding Gastric Dieulafoy Lesions Status Post 4 Endoclips 10/25  Acute Symptomatic Anemia  - weaned from pressors and the ventilator  - s/p 1 unit pRBC   - s/p Tranexamic acid  - Last dose of Xarelto (10/24)  - currently on Lovenox  - Family History: negative for GI malignancies  - Status post EGD 10/25: Normal mucosa in the whole esophagus. Erosive gastritis. Erythematous oozing lesions in the stomach body concerning for Dieulafoy lesions (endoclips x 4). A large amount of coffee ground material and blood was seen in the duodenal bulb and second part of duodenum. After irrigation, cleaning, and carefully examining under water, there was no evidence of active bleeding or ulcer. .  - Keep Hb >8. Active type and screen, 2 x 18 gauge IVs  - IV Protonix transitioned to 40mg PO BID  - Swallowing eval noted, diet started with thickened liquids, PO intake poor, still complaining of sour taste, start IVF  - EP eval noted  - avoid any NSAIDs  - If active bleeding with hemodynamic instability please obtain CTA and inform GI STAT    Acute Hypoxic Respiratory Failure  Component of Sepsis secondary to Aspiration Pneumonia  - weaned from vent on 10/29  - IV Zosyn transitioned to Rocephin and Flagyl  - currently stable on RA    A Fib with AVR  - Xarelto restarted  - low dose Metoprolol started for HR    Feed with assistance  Obtain rehab evaluation  - cautious IVF    Supportive measures, D/C planning back to SNF

## 2023-11-03 PROBLEM — I63.9 CEREBRAL INFARCTION, UNSPECIFIED: Chronic | Status: ACTIVE | Noted: 2023-10-25

## 2023-11-03 PROBLEM — I10 ESSENTIAL (PRIMARY) HYPERTENSION: Chronic | Status: ACTIVE | Noted: 2023-10-25

## 2023-11-03 PROBLEM — E11.9 TYPE 2 DIABETES MELLITUS WITHOUT COMPLICATIONS: Chronic | Status: ACTIVE | Noted: 2023-10-25

## 2023-11-03 PROBLEM — I48.91 UNSPECIFIED ATRIAL FIBRILLATION: Chronic | Status: ACTIVE | Noted: 2023-10-25

## 2023-11-03 PROBLEM — Z00.00 ENCOUNTER FOR PREVENTIVE HEALTH EXAMINATION: Status: ACTIVE | Noted: 2023-11-03

## 2023-11-03 LAB
ALBUMIN SERPL ELPH-MCNC: 3.3 G/DL — LOW (ref 3.5–5.2)
ALBUMIN SERPL ELPH-MCNC: 3.3 G/DL — LOW (ref 3.5–5.2)
ALBUMIN SERPL ELPH-MCNC: 3.4 G/DL — LOW (ref 3.5–5.2)
ALBUMIN SERPL ELPH-MCNC: 3.4 G/DL — LOW (ref 3.5–5.2)
ALP SERPL-CCNC: 57 U/L — SIGNIFICANT CHANGE UP (ref 30–115)
ALP SERPL-CCNC: 57 U/L — SIGNIFICANT CHANGE UP (ref 30–115)
ALP SERPL-CCNC: 58 U/L — SIGNIFICANT CHANGE UP (ref 30–115)
ALP SERPL-CCNC: 58 U/L — SIGNIFICANT CHANGE UP (ref 30–115)
ALT FLD-CCNC: 28 U/L — SIGNIFICANT CHANGE UP (ref 0–41)
ALT FLD-CCNC: 28 U/L — SIGNIFICANT CHANGE UP (ref 0–41)
ALT FLD-CCNC: 29 U/L — SIGNIFICANT CHANGE UP (ref 0–41)
ALT FLD-CCNC: 29 U/L — SIGNIFICANT CHANGE UP (ref 0–41)
ANION GAP SERPL CALC-SCNC: 19 MMOL/L — HIGH (ref 7–14)
ANION GAP SERPL CALC-SCNC: 19 MMOL/L — HIGH (ref 7–14)
ANION GAP SERPL CALC-SCNC: 20 MMOL/L — HIGH (ref 7–14)
ANION GAP SERPL CALC-SCNC: 20 MMOL/L — HIGH (ref 7–14)
AST SERPL-CCNC: 51 U/L — HIGH (ref 0–41)
AST SERPL-CCNC: 51 U/L — HIGH (ref 0–41)
AST SERPL-CCNC: 53 U/L — HIGH (ref 0–41)
AST SERPL-CCNC: 53 U/L — HIGH (ref 0–41)
BASOPHILS # BLD AUTO: 0.04 K/UL — SIGNIFICANT CHANGE UP (ref 0–0.2)
BASOPHILS # BLD AUTO: 0.04 K/UL — SIGNIFICANT CHANGE UP (ref 0–0.2)
BASOPHILS # BLD AUTO: 0.05 K/UL — SIGNIFICANT CHANGE UP (ref 0–0.2)
BASOPHILS # BLD AUTO: 0.05 K/UL — SIGNIFICANT CHANGE UP (ref 0–0.2)
BASOPHILS NFR BLD AUTO: 0.5 % — SIGNIFICANT CHANGE UP (ref 0–1)
BILIRUB SERPL-MCNC: 0.3 MG/DL — SIGNIFICANT CHANGE UP (ref 0.2–1.2)
BUN SERPL-MCNC: 9 MG/DL — LOW (ref 10–20)
CALCIUM SERPL-MCNC: 8.7 MG/DL — SIGNIFICANT CHANGE UP (ref 8.4–10.5)
CALCIUM SERPL-MCNC: 8.7 MG/DL — SIGNIFICANT CHANGE UP (ref 8.4–10.5)
CALCIUM SERPL-MCNC: 9 MG/DL — SIGNIFICANT CHANGE UP (ref 8.4–10.5)
CALCIUM SERPL-MCNC: 9 MG/DL — SIGNIFICANT CHANGE UP (ref 8.4–10.5)
CHLORIDE SERPL-SCNC: 100 MMOL/L — SIGNIFICANT CHANGE UP (ref 98–110)
CHLORIDE SERPL-SCNC: 100 MMOL/L — SIGNIFICANT CHANGE UP (ref 98–110)
CHLORIDE SERPL-SCNC: 99 MMOL/L — SIGNIFICANT CHANGE UP (ref 98–110)
CHLORIDE SERPL-SCNC: 99 MMOL/L — SIGNIFICANT CHANGE UP (ref 98–110)
CO2 SERPL-SCNC: 22 MMOL/L — SIGNIFICANT CHANGE UP (ref 17–32)
CO2 SERPL-SCNC: 22 MMOL/L — SIGNIFICANT CHANGE UP (ref 17–32)
CO2 SERPL-SCNC: 24 MMOL/L — SIGNIFICANT CHANGE UP (ref 17–32)
CO2 SERPL-SCNC: 24 MMOL/L — SIGNIFICANT CHANGE UP (ref 17–32)
CREAT SERPL-MCNC: 0.6 MG/DL — LOW (ref 0.7–1.5)
CREAT SERPL-MCNC: 0.6 MG/DL — LOW (ref 0.7–1.5)
CREAT SERPL-MCNC: 0.7 MG/DL — SIGNIFICANT CHANGE UP (ref 0.7–1.5)
CREAT SERPL-MCNC: 0.7 MG/DL — SIGNIFICANT CHANGE UP (ref 0.7–1.5)
EGFR: 84 ML/MIN/1.73M2 — SIGNIFICANT CHANGE UP
EGFR: 84 ML/MIN/1.73M2 — SIGNIFICANT CHANGE UP
EGFR: 87 ML/MIN/1.73M2 — SIGNIFICANT CHANGE UP
EGFR: 87 ML/MIN/1.73M2 — SIGNIFICANT CHANGE UP
EOSINOPHIL # BLD AUTO: 0.04 K/UL — SIGNIFICANT CHANGE UP (ref 0–0.7)
EOSINOPHIL # BLD AUTO: 0.04 K/UL — SIGNIFICANT CHANGE UP (ref 0–0.7)
EOSINOPHIL # BLD AUTO: 0.09 K/UL — SIGNIFICANT CHANGE UP (ref 0–0.7)
EOSINOPHIL # BLD AUTO: 0.09 K/UL — SIGNIFICANT CHANGE UP (ref 0–0.7)
EOSINOPHIL NFR BLD AUTO: 0.4 % — SIGNIFICANT CHANGE UP (ref 0–8)
EOSINOPHIL NFR BLD AUTO: 0.4 % — SIGNIFICANT CHANGE UP (ref 0–8)
EOSINOPHIL NFR BLD AUTO: 1 % — SIGNIFICANT CHANGE UP (ref 0–8)
EOSINOPHIL NFR BLD AUTO: 1 % — SIGNIFICANT CHANGE UP (ref 0–8)
GLUCOSE BLDC GLUCOMTR-MCNC: 161 MG/DL — HIGH (ref 70–99)
GLUCOSE BLDC GLUCOMTR-MCNC: 161 MG/DL — HIGH (ref 70–99)
GLUCOSE BLDC GLUCOMTR-MCNC: 215 MG/DL — HIGH (ref 70–99)
GLUCOSE BLDC GLUCOMTR-MCNC: 215 MG/DL — HIGH (ref 70–99)
GLUCOSE BLDC GLUCOMTR-MCNC: 282 MG/DL — HIGH (ref 70–99)
GLUCOSE BLDC GLUCOMTR-MCNC: 282 MG/DL — HIGH (ref 70–99)
GLUCOSE BLDC GLUCOMTR-MCNC: 94 MG/DL — SIGNIFICANT CHANGE UP (ref 70–99)
GLUCOSE BLDC GLUCOMTR-MCNC: 94 MG/DL — SIGNIFICANT CHANGE UP (ref 70–99)
GLUCOSE SERPL-MCNC: 167 MG/DL — HIGH (ref 70–99)
GLUCOSE SERPL-MCNC: 167 MG/DL — HIGH (ref 70–99)
GLUCOSE SERPL-MCNC: 185 MG/DL — HIGH (ref 70–99)
GLUCOSE SERPL-MCNC: 185 MG/DL — HIGH (ref 70–99)
HCT VFR BLD CALC: 32.8 % — LOW (ref 37–47)
HCT VFR BLD CALC: 32.8 % — LOW (ref 37–47)
HCT VFR BLD CALC: 33.6 % — LOW (ref 37–47)
HCT VFR BLD CALC: 33.6 % — LOW (ref 37–47)
HCT VFR BLD CALC: 36.8 % — LOW (ref 37–47)
HCT VFR BLD CALC: 36.8 % — LOW (ref 37–47)
HGB BLD-MCNC: 10.4 G/DL — LOW (ref 12–16)
HGB BLD-MCNC: 10.4 G/DL — LOW (ref 12–16)
HGB BLD-MCNC: 11 G/DL — LOW (ref 12–16)
HGB BLD-MCNC: 11 G/DL — LOW (ref 12–16)
HGB BLD-MCNC: 11.6 G/DL — LOW (ref 12–16)
HGB BLD-MCNC: 11.6 G/DL — LOW (ref 12–16)
IMM GRANULOCYTES NFR BLD AUTO: 0.3 % — SIGNIFICANT CHANGE UP (ref 0.1–0.3)
LYMPHOCYTES # BLD AUTO: 1.72 K/UL — SIGNIFICANT CHANGE UP (ref 1.2–3.4)
LYMPHOCYTES # BLD AUTO: 1.72 K/UL — SIGNIFICANT CHANGE UP (ref 1.2–3.4)
LYMPHOCYTES # BLD AUTO: 1.92 K/UL — SIGNIFICANT CHANGE UP (ref 1.2–3.4)
LYMPHOCYTES # BLD AUTO: 1.92 K/UL — SIGNIFICANT CHANGE UP (ref 1.2–3.4)
LYMPHOCYTES # BLD AUTO: 19.5 % — LOW (ref 20.5–51.1)
LYMPHOCYTES # BLD AUTO: 19.5 % — LOW (ref 20.5–51.1)
LYMPHOCYTES # BLD AUTO: 20.7 % — SIGNIFICANT CHANGE UP (ref 20.5–51.1)
LYMPHOCYTES # BLD AUTO: 20.7 % — SIGNIFICANT CHANGE UP (ref 20.5–51.1)
MAGNESIUM SERPL-MCNC: 1.8 MG/DL — SIGNIFICANT CHANGE UP (ref 1.8–2.4)
MCHC RBC-ENTMCNC: 27.9 PG — SIGNIFICANT CHANGE UP (ref 27–31)
MCHC RBC-ENTMCNC: 27.9 PG — SIGNIFICANT CHANGE UP (ref 27–31)
MCHC RBC-ENTMCNC: 28 PG — SIGNIFICANT CHANGE UP (ref 27–31)
MCHC RBC-ENTMCNC: 31.5 G/DL — LOW (ref 32–37)
MCHC RBC-ENTMCNC: 31.5 G/DL — LOW (ref 32–37)
MCHC RBC-ENTMCNC: 31.7 G/DL — LOW (ref 32–37)
MCHC RBC-ENTMCNC: 31.7 G/DL — LOW (ref 32–37)
MCHC RBC-ENTMCNC: 32.7 G/DL — SIGNIFICANT CHANGE UP (ref 32–37)
MCHC RBC-ENTMCNC: 32.7 G/DL — SIGNIFICANT CHANGE UP (ref 32–37)
MCV RBC AUTO: 85.5 FL — SIGNIFICANT CHANGE UP (ref 81–99)
MCV RBC AUTO: 85.5 FL — SIGNIFICANT CHANGE UP (ref 81–99)
MCV RBC AUTO: 88.4 FL — SIGNIFICANT CHANGE UP (ref 81–99)
MCV RBC AUTO: 88.4 FL — SIGNIFICANT CHANGE UP (ref 81–99)
MCV RBC AUTO: 88.5 FL — SIGNIFICANT CHANGE UP (ref 81–99)
MCV RBC AUTO: 88.5 FL — SIGNIFICANT CHANGE UP (ref 81–99)
MONOCYTES # BLD AUTO: 1.07 K/UL — HIGH (ref 0.1–0.6)
MONOCYTES # BLD AUTO: 1.07 K/UL — HIGH (ref 0.1–0.6)
MONOCYTES # BLD AUTO: 1.1 K/UL — HIGH (ref 0.1–0.6)
MONOCYTES # BLD AUTO: 1.1 K/UL — HIGH (ref 0.1–0.6)
MONOCYTES NFR BLD AUTO: 11.6 % — HIGH (ref 1.7–9.3)
MONOCYTES NFR BLD AUTO: 11.6 % — HIGH (ref 1.7–9.3)
MONOCYTES NFR BLD AUTO: 12.5 % — HIGH (ref 1.7–9.3)
MONOCYTES NFR BLD AUTO: 12.5 % — HIGH (ref 1.7–9.3)
NEUTROPHILS # BLD AUTO: 5.82 K/UL — SIGNIFICANT CHANGE UP (ref 1.4–6.5)
NEUTROPHILS # BLD AUTO: 5.82 K/UL — SIGNIFICANT CHANGE UP (ref 1.4–6.5)
NEUTROPHILS # BLD AUTO: 6.15 K/UL — SIGNIFICANT CHANGE UP (ref 1.4–6.5)
NEUTROPHILS # BLD AUTO: 6.15 K/UL — SIGNIFICANT CHANGE UP (ref 1.4–6.5)
NEUTROPHILS NFR BLD AUTO: 66.2 % — SIGNIFICANT CHANGE UP (ref 42.2–75.2)
NEUTROPHILS NFR BLD AUTO: 66.2 % — SIGNIFICANT CHANGE UP (ref 42.2–75.2)
NEUTROPHILS NFR BLD AUTO: 66.5 % — SIGNIFICANT CHANGE UP (ref 42.2–75.2)
NEUTROPHILS NFR BLD AUTO: 66.5 % — SIGNIFICANT CHANGE UP (ref 42.2–75.2)
NRBC # BLD: 0 /100 WBCS — SIGNIFICANT CHANGE UP (ref 0–0)
PHOSPHATE SERPL-MCNC: 3 MG/DL — SIGNIFICANT CHANGE UP (ref 2.1–4.9)
PHOSPHATE SERPL-MCNC: 3 MG/DL — SIGNIFICANT CHANGE UP (ref 2.1–4.9)
PHOSPHATE SERPL-MCNC: 3.2 MG/DL — SIGNIFICANT CHANGE UP (ref 2.1–4.9)
PHOSPHATE SERPL-MCNC: 3.2 MG/DL — SIGNIFICANT CHANGE UP (ref 2.1–4.9)
PLATELET # BLD AUTO: 377 K/UL — SIGNIFICANT CHANGE UP (ref 130–400)
PLATELET # BLD AUTO: 377 K/UL — SIGNIFICANT CHANGE UP (ref 130–400)
PLATELET # BLD AUTO: 438 K/UL — HIGH (ref 130–400)
PLATELET # BLD AUTO: 438 K/UL — HIGH (ref 130–400)
PLATELET # BLD AUTO: 447 K/UL — HIGH (ref 130–400)
PLATELET # BLD AUTO: 447 K/UL — HIGH (ref 130–400)
PMV BLD: 10.1 FL — SIGNIFICANT CHANGE UP (ref 7.4–10.4)
PMV BLD: 10.2 FL — SIGNIFICANT CHANGE UP (ref 7.4–10.4)
PMV BLD: 10.2 FL — SIGNIFICANT CHANGE UP (ref 7.4–10.4)
POTASSIUM SERPL-MCNC: 2.9 MMOL/L — LOW (ref 3.5–5)
POTASSIUM SERPL-MCNC: 2.9 MMOL/L — LOW (ref 3.5–5)
POTASSIUM SERPL-MCNC: 3.5 MMOL/L — SIGNIFICANT CHANGE UP (ref 3.5–5)
POTASSIUM SERPL-MCNC: 3.5 MMOL/L — SIGNIFICANT CHANGE UP (ref 3.5–5)
POTASSIUM SERPL-SCNC: 2.9 MMOL/L — LOW (ref 3.5–5)
POTASSIUM SERPL-SCNC: 2.9 MMOL/L — LOW (ref 3.5–5)
POTASSIUM SERPL-SCNC: 3.5 MMOL/L — SIGNIFICANT CHANGE UP (ref 3.5–5)
POTASSIUM SERPL-SCNC: 3.5 MMOL/L — SIGNIFICANT CHANGE UP (ref 3.5–5)
PROT SERPL-MCNC: 6.2 G/DL — SIGNIFICANT CHANGE UP (ref 6–8)
PROT SERPL-MCNC: 6.2 G/DL — SIGNIFICANT CHANGE UP (ref 6–8)
PROT SERPL-MCNC: 6.4 G/DL — SIGNIFICANT CHANGE UP (ref 6–8)
PROT SERPL-MCNC: 6.4 G/DL — SIGNIFICANT CHANGE UP (ref 6–8)
RBC # BLD: 3.71 M/UL — LOW (ref 4.2–5.4)
RBC # BLD: 3.71 M/UL — LOW (ref 4.2–5.4)
RBC # BLD: 3.93 M/UL — LOW (ref 4.2–5.4)
RBC # BLD: 3.93 M/UL — LOW (ref 4.2–5.4)
RBC # BLD: 4.16 M/UL — LOW (ref 4.2–5.4)
RBC # BLD: 4.16 M/UL — LOW (ref 4.2–5.4)
RBC # FLD: 15.8 % — HIGH (ref 11.5–14.5)
RBC # FLD: 15.8 % — HIGH (ref 11.5–14.5)
RBC # FLD: 16.1 % — HIGH (ref 11.5–14.5)
RBC # FLD: 16.1 % — HIGH (ref 11.5–14.5)
RBC # FLD: 16.3 % — HIGH (ref 11.5–14.5)
RBC # FLD: 16.3 % — HIGH (ref 11.5–14.5)
SODIUM SERPL-SCNC: 141 MMOL/L — SIGNIFICANT CHANGE UP (ref 135–146)
SODIUM SERPL-SCNC: 141 MMOL/L — SIGNIFICANT CHANGE UP (ref 135–146)
SODIUM SERPL-SCNC: 143 MMOL/L — SIGNIFICANT CHANGE UP (ref 135–146)
SODIUM SERPL-SCNC: 143 MMOL/L — SIGNIFICANT CHANGE UP (ref 135–146)
WBC # BLD: 10.17 K/UL — SIGNIFICANT CHANGE UP (ref 4.8–10.8)
WBC # BLD: 10.17 K/UL — SIGNIFICANT CHANGE UP (ref 4.8–10.8)
WBC # BLD: 8.8 K/UL — SIGNIFICANT CHANGE UP (ref 4.8–10.8)
WBC # BLD: 8.8 K/UL — SIGNIFICANT CHANGE UP (ref 4.8–10.8)
WBC # BLD: 9.26 K/UL — SIGNIFICANT CHANGE UP (ref 4.8–10.8)
WBC # BLD: 9.26 K/UL — SIGNIFICANT CHANGE UP (ref 4.8–10.8)
WBC # FLD AUTO: 10.17 K/UL — SIGNIFICANT CHANGE UP (ref 4.8–10.8)
WBC # FLD AUTO: 10.17 K/UL — SIGNIFICANT CHANGE UP (ref 4.8–10.8)
WBC # FLD AUTO: 8.8 K/UL — SIGNIFICANT CHANGE UP (ref 4.8–10.8)
WBC # FLD AUTO: 8.8 K/UL — SIGNIFICANT CHANGE UP (ref 4.8–10.8)
WBC # FLD AUTO: 9.26 K/UL — SIGNIFICANT CHANGE UP (ref 4.8–10.8)
WBC # FLD AUTO: 9.26 K/UL — SIGNIFICANT CHANGE UP (ref 4.8–10.8)

## 2023-11-03 PROCEDURE — 93010 ELECTROCARDIOGRAM REPORT: CPT

## 2023-11-03 RX ORDER — POTASSIUM CHLORIDE 20 MEQ
40 PACKET (EA) ORAL
Refills: 0 | Status: COMPLETED | OUTPATIENT
Start: 2023-11-03 | End: 2023-11-03

## 2023-11-03 RX ORDER — DIPHENHYDRAMINE HYDROCHLORIDE AND LIDOCAINE HYDROCHLORIDE AND ALUMINUM HYDROXIDE AND MAGNESIUM HYDRO
5 KIT THREE TIMES A DAY
Refills: 0 | Status: DISCONTINUED | OUTPATIENT
Start: 2023-11-03 | End: 2023-11-05

## 2023-11-03 RX ADMIN — QUETIAPINE FUMARATE 50 MILLIGRAM(S): 200 TABLET, FILM COATED ORAL at 21:08

## 2023-11-03 RX ADMIN — Medication 2: at 11:40

## 2023-11-03 RX ADMIN — LACTULOSE 20 GRAM(S): 10 SOLUTION ORAL at 11:41

## 2023-11-03 RX ADMIN — RIVAROXABAN 20 MILLIGRAM(S): KIT at 18:17

## 2023-11-03 RX ADMIN — Medication 1: at 18:14

## 2023-11-03 RX ADMIN — Medication 40 MILLIEQUIVALENT(S): at 08:24

## 2023-11-03 RX ADMIN — Medication 40 MILLIEQUIVALENT(S): at 11:39

## 2023-11-03 RX ADMIN — Medication 12.5 MILLIGRAM(S): at 05:23

## 2023-11-03 RX ADMIN — LOSARTAN POTASSIUM 25 MILLIGRAM(S): 100 TABLET, FILM COATED ORAL at 05:24

## 2023-11-03 RX ADMIN — Medication 1 SPRAY(S): at 14:06

## 2023-11-03 RX ADMIN — DIPHENHYDRAMINE HYDROCHLORIDE AND LIDOCAINE HYDROCHLORIDE AND ALUMINUM HYDROXIDE AND MAGNESIUM HYDRO 5 MILLILITER(S): KIT at 18:06

## 2023-11-03 RX ADMIN — Medication 1 SPRAY(S): at 21:08

## 2023-11-03 RX ADMIN — PANTOPRAZOLE SODIUM 40 MILLIGRAM(S): 20 TABLET, DELAYED RELEASE ORAL at 05:24

## 2023-11-03 RX ADMIN — LACTULOSE 20 GRAM(S): 10 SOLUTION ORAL at 18:17

## 2023-11-03 RX ADMIN — DIPHENHYDRAMINE HYDROCHLORIDE AND LIDOCAINE HYDROCHLORIDE AND ALUMINUM HYDROXIDE AND MAGNESIUM HYDRO 5 MILLILITER(S): KIT at 21:08

## 2023-11-03 RX ADMIN — Medication 1 SPRAY(S): at 05:23

## 2023-11-03 RX ADMIN — Medication 12.5 MILLIGRAM(S): at 18:17

## 2023-11-03 RX ADMIN — CHLORHEXIDINE GLUCONATE 1 APPLICATION(S): 213 SOLUTION TOPICAL at 05:23

## 2023-11-03 RX ADMIN — PANTOPRAZOLE SODIUM 40 MILLIGRAM(S): 20 TABLET, DELAYED RELEASE ORAL at 18:17

## 2023-11-03 RX ADMIN — POLYETHYLENE GLYCOL 3350 17 GRAM(S): 17 POWDER, FOR SOLUTION ORAL at 05:24

## 2023-11-03 RX ADMIN — Medication 650 MILLIGRAM(S): at 18:49

## 2023-11-03 RX ADMIN — Medication 40 MILLIEQUIVALENT(S): at 11:41

## 2023-11-03 RX ADMIN — SENNA PLUS 2 TABLET(S): 8.6 TABLET ORAL at 21:08

## 2023-11-03 RX ADMIN — Medication 650 MILLIGRAM(S): at 18:19

## 2023-11-03 NOTE — PROGRESS NOTE ADULT - ASSESSMENT
Assessment and Plan  Case of an 88 year old female patient known to have DM, HTN, DL, CVA, and atrial fibrillation on Xarelto who was brought to the ED on 10/25 for evaluation of episodes of hematemesis, found to be hypotensive and tachycardic, admitted for further management. Stay complicated by recurrence of hematemesis requiring intubation for airway protection. We are consulted for concern of upper GI bleeding.      Hematemesis w/ Hemorrhagic shock secondary to upper GI bleed  Likely Secondary to Bleeding Gastric Dieulafoy Lesions Status Post 4 Endoclips 10/25  Acute Symptomatic Anemia  - weaned from pressors and the ventilator  - s/p 1 unit pRBC   - s/p Tranexamic acid  - Last dose of Xarelto (10/24)  - currently on Lovenox  - Family History: negative for GI malignancies  - Status post EGD 10/25: Normal mucosa in the whole esophagus. Erosive gastritis. Erythematous oozing lesions in the stomach body concerning for Dieulafoy lesions (endoclips x 4). A large amount of coffee ground material and blood was seen in the duodenal bulb and second part of duodenum. After irrigation, cleaning, and carefully examining under water, there was no evidence of active bleeding or ulcer. .  - Keep Hb >8. Active type and screen, 2 x 18 gauge IVs  - IV Protonix transitioned to 40mg PO BID  - Swallowing eval noted, diet started with thickened liquids  - PO intake remains poor, complaining of mouth pain?  - give magic mouthwash and observe  - EP eval noted  - avoid any NSAIDs  - If active bleeding with hemodynamic instability please obtain CTA and inform GI STAT    Acute Hypoxic Respiratory Failure  Component of Sepsis secondary to Aspiration Pneumonia  - weaned from vent on 10/29  - IV Zosyn transitioned to Rocephin and Flagyl, now completed  - currently stable on RA    A Fib with AVR  - Xarelto restarted  - low dose Metoprolol started for HR    Feed with assistance  Obtain rehab evaluation  - cautious IVF    Supportive measures, D/C planning back to SNF

## 2023-11-03 NOTE — SWALLOW BEDSIDE ASSESSMENT ADULT - SLP PERTINENT HISTORY OF CURRENT PROBLEM
Pt is an 87 y/o F w/ PMHx: diabetes, CVA, afib (on Xarelto), HTN, presented for hematemesis. Pt intubated on arrival for airway protection. +Sepsis, UGIB, AHRF w/ likely aspiration (intubated 10/25, s/p extubation 10/29). Bedside EGD done 10/25: normal esophagus, no bleeding, large amount of blood clots in stomach, three different areas of erythema concerning for dieulafoy lesions; s/p four endoclips. CXR (-).
Pt is an 89 y/o F w/ PMHx: diabetes, CVA, afib (on Xarelto), HTN, presented for hematemesis. Pt intubated on arrival for airway protection. +Sepsis, UGIB, AHRF w/ likely aspiration (intubated 10/25, s/p extubation 10/29). Bedside EGD done 10/25: normal esophagus, no bleeding, large amount of blood clots in stomach, three different areas of erythema concerning for dieulafoy lesions; s/p four endoclips. CXR (-).

## 2023-11-03 NOTE — SWALLOW BEDSIDE ASSESSMENT ADULT - ADDITIONAL RECOMMENDATIONS
SLP will f/u Pt not a candidate for instrumental swallow study. Recommend continue mildly thick liquids & SLP evaluation at SNF.

## 2023-11-03 NOTE — CHART NOTE - NSCHARTNOTEFT_GEN_A_CORE
GI NUTRITION SUPPORT TEAM  -  PROGRESS NOTE     Interval Events:        REVIEW OF SYSTEMS:  Negative except as noted above.     VITALS:  T(F): 98.4 (11-03 @ 06:00), Max: 98.4 (11-03 @ 06:00)  HR: 89 (11-03 @ 06:00) (89 - 89)  BP: 136/71 (11-03 @ 06:00) (136/71 - 136/71)  RR: 18 (11-03 @ 06:00) (18 - 18)  SpO2: --    HEIGHT/WEIGHT/BMI:   Height (cm): 165.1 (10-25)  Weight (kg): 71 (10-25)  BMI (kg/m2): 26 (10-25)    I/Os:     MEDICATIONS:   acetaminophen     Tablet .. 650 milliGRAM(s) Oral every 6 hours PRN  benzocaine 20% Spray 1 Spray(s) Topical three times a day  chlorhexidine 2% Cloths 1 Application(s) Topical <User Schedule>  FIRST- Mouthwash  BLM 5 milliLiter(s) Swish and Spit three times a day  insulin lispro (ADMELOG) corrective regimen sliding scale   SubCutaneous three times a day before meals  lactated ringers. 1000 milliLiter(s) (65 mL/Hr) IV Continuous <Continuous>  lactulose Syrup 20 Gram(s) Oral every 6 hours  losartan 25 milliGRAM(s) Oral daily  metoprolol tartrate 12.5 milliGRAM(s) Oral two times a day  pantoprazole   Suspension 40 milliGRAM(s) Oral two times a day  polyethylene glycol 3350 17 Gram(s) Oral two times a day  QUEtiapine 50 milliGRAM(s) Oral at bedtime  rivaroxaban 20 milliGRAM(s) Oral with dinner  senna 2 Tablet(s) Oral at bedtime    LABS:                         11.6   8.80  )-----------( 447      ( 03 Nov 2023 07:05 )             36.8     143  |  100  |  9<L>  ----------------------------<  185<H>          (11-03-23 @ 07:05)  3.5   |  24  |  0.7    Ca    9.0          (11-03-23 @ 07:05)  Phos  3.2         (11-03-23 @ 07:05)  Mg     1.8         (11-03-23 @ 07:05)    TPro  6.4  /  Alb  3.4<L>  /  TBili  0.3  /  DBili  x   /  AST  53<H>  /  ALT  29  /  AlkPhos  58       11-03-23 @ 07:05      Blood Glucose (Past 24 hours):  215 mg/dL (11-03 @ 10:48)  94 mg/dL (11-03 @ 07:59)  262 mg/dL (11-02 @ 21:41)  151 mg/dL (11-02 @ 16:48)    DIET:   Diet, Pureed:   Mildly Thick Liquids (MILDTHICKLIQS) (10-30-23 @ 11:52) [Active]    ASSESSMENT  88 year old female patient known to have DM, HTN, DL, CVA, and atrial fibrillation on Xarelto who was brought to the ED on 10/25 for evaluation of episodes of hematemesis, found to be hypotensive and tachycardic, admitted for further management. Stay complicated by recurrence of hematemesis requiring intubation for airway protection.    - Hematemesis w/ Hemorrhagic shock secondary to upper GI bleed  - Likely Secondary to Bleeding Gastric Dieulafoy Lesions Status Post 4 Endoclips 10/25  - Acute on Chronic Anemia  - hypokalemia       PLAN  - PO diet as tolerated, consistency per speech. Add CHO consistent heart healthy dietary modifications  - add magic cup 4oz q12hrs   - would benefit from placing NGT for feeding, hydration, meds if cont to refuse to eat  - consider appetite stimulant  - monitor BM's (on miralax, senna, lactulose)  - glycemic control   - will follow. GI NUTRITION SUPPORT TEAM  -  PROGRESS NOTE     Interval Events:    Awake, alert, confused  Swallow eval today, cont PO diet   Intake remains very poor, today had 2 spoons of pureed diet for lunch and refusing anymore    REVIEW OF SYSTEMS:  Negative except as noted above.     VITALS:  T(F): 98.4 (11-03 @ 06:00), Max: 98.4 (11-03 @ 06:00)  HR: 89 (11-03 @ 06:00) (89 - 89)  BP: 136/71 (11-03 @ 06:00) (136/71 - 136/71)  RR: 18 (11-03 @ 06:00) (18 - 18)  SpO2: --    HEIGHT/WEIGHT/BMI:   Height (cm): 165.1 (10-25)  Weight (kg): 71 (10-25)  BMI (kg/m2): 26 (10-25)    I/Os:     MEDICATIONS:   acetaminophen     Tablet .. 650 milliGRAM(s) Oral every 6 hours PRN  benzocaine 20% Spray 1 Spray(s) Topical three times a day  chlorhexidine 2% Cloths 1 Application(s) Topical <User Schedule>  FIRST- Mouthwash  BLM 5 milliLiter(s) Swish and Spit three times a day  insulin lispro (ADMELOG) corrective regimen sliding scale   SubCutaneous three times a day before meals  lactated ringers. 1000 milliLiter(s) (65 mL/Hr) IV Continuous <Continuous>  lactulose Syrup 20 Gram(s) Oral every 6 hours  losartan 25 milliGRAM(s) Oral daily  metoprolol tartrate 12.5 milliGRAM(s) Oral two times a day  pantoprazole   Suspension 40 milliGRAM(s) Oral two times a day  polyethylene glycol 3350 17 Gram(s) Oral two times a day  QUEtiapine 50 milliGRAM(s) Oral at bedtime  rivaroxaban 20 milliGRAM(s) Oral with dinner  senna 2 Tablet(s) Oral at bedtime    LABS:                         11.6   8.80  )-----------( 447      ( 03 Nov 2023 07:05 )             36.8     143  |  100  |  9<L>  ----------------------------<  185<H>          (11-03-23 @ 07:05)  3.5   |  24  |  0.7    Ca    9.0          (11-03-23 @ 07:05)  Phos  3.2         (11-03-23 @ 07:05)  Mg     1.8         (11-03-23 @ 07:05)    TPro  6.4  /  Alb  3.4<L>  /  TBili  0.3  /  DBili  x   /  AST  53<H>  /  ALT  29  /  AlkPhos  58       11-03-23 @ 07:05      Blood Glucose (Past 24 hours):  215 mg/dL (11-03 @ 10:48)  94 mg/dL (11-03 @ 07:59)  262 mg/dL (11-02 @ 21:41)  151 mg/dL (11-02 @ 16:48)    DIET:   Diet, Pureed:   Mildly Thick Liquids (MILDTHICKLIQS) (10-30-23 @ 11:52) [Active]    ASSESSMENT  88 year old female patient known to have DM, HTN, DL, CVA, and atrial fibrillation on Xarelto who was brought to the ED on 10/25 for evaluation of episodes of hematemesis, found to be hypotensive and tachycardic, admitted for further management. Stay complicated by recurrence of hematemesis requiring intubation for airway protection.    - Hematemesis w/ Hemorrhagic shock secondary to upper GI bleed  - Likely Secondary to Bleeding Gastric Dieulafoy Lesions Status Post 4 Endoclips 10/25  - Acute on Chronic Anemia  - hypokalemia       PLAN  - PO diet as tolerated, consistency per speech. Add CHO consistent heart healthy dietary modifications  - 1:1 feeds  - add magic cup 4oz q12hrs   - consider placing NGT for feeding, hydration, meds if cont to refuse to eat  - consider appetite stimulant  - monitor BM's (on miralax, senna, lactulose)  - glycemic control   - will follow.

## 2023-11-03 NOTE — SWALLOW BEDSIDE ASSESSMENT ADULT - NS SPL SWALLOW CLINIC TRIAL FT
+continues to be aversive to po, minimal amt taken from tsp per sip, reports all trials to taste sour. +delayed overt s/s of penetration/aspiration w/ thin liquids, +toleration of puree and mildly thick liquids w/o immediate overt s/s of penetration/aspiration
+delayed overt s/s of penetration/aspiration w/ thin liquids, +toleration of puree and mildly thick liquids w/o immediate overt s/s of penetration/aspiration

## 2023-11-03 NOTE — SWALLOW BEDSIDE ASSESSMENT ADULT - SWALLOW EVAL: DIAGNOSIS
+toleration for puree and mildly thick liquids w/o overt s/s aspiration/penetration; suspected pharyngeal dysphagia w/ thin liquid trials.
+delayed overt s/s of penetration/aspiration w/ thin liquids +toleration of puree and mildly thick liquids w/o immediate overt s/s of penetration/aspiration
+continues to be aversive to po, minimal amt taken from tsp per sip, reports all trials to taste sour. +delayed overt s/s of penetration/aspiration w/ thin liquids, +toleration of puree and mildly thick liquids w/o immediate overt s/s of penetration/aspiration
pt aversive to all PO trials despite encouragement from SLP

## 2023-11-03 NOTE — SWALLOW BEDSIDE ASSESSMENT ADULT - SWALLOW EVAL: RECOMMENDED DIET
puree diet w/ mildly thick liquids
puree diet w/ mildly thick liquids
puree, mildly thick liquids
continue puree, mildly thick liquids

## 2023-11-03 NOTE — SWALLOW BEDSIDE ASSESSMENT ADULT - NS ASR SWALLOW FINDINGS DISCUS
Nursing/Patient
Physician/Nursing/Patient/Family
Physician/Nursing/Patient
Physician/Nursing/Patient/Family

## 2023-11-03 NOTE — SWALLOW BEDSIDE ASSESSMENT ADULT - SWALLOW EVAL: RECOMMENDED FEEDING/EATING TECHNIQUES
position upright (90 degrees)/small sips/bites
maintain upright posture during/after eating for 30 mins/oral hygiene/position upright (90 degrees)/small sips/bites
maintain upright posture during/after eating for 30 mins/oral hygiene/position upright (90 degrees)/small sips/bites
position upright (90 degrees)/small sips/bites

## 2023-11-03 NOTE — PROGRESS NOTE ADULT - SUBJECTIVE AND OBJECTIVE BOX
CRISSY JAMES  88y  Female  HPI:    88-year-old female with a past medical history significant for diabetes, CVA, atrial fibrillation on Xarelto, HTN,  who presents due to concern for upper GI bleed. Per patient she had epigastric burning sensation this morning when she woke up. This was associated with nausea and multiple episodes of vomiting. Per daughter, patient vomited a cupful of bright red blood with clots (three times).  Patient in route was hemodynamically stable however when presented to the ED patient was noted to be hypotensive and tachycardic.  Patient had a witnessed episode of bright red blood vomiting.  At that point patient became more lethargic.  Patient's daughter at bedside .  Patient and daughter both would like to be kept full code.  At that point made a decision with family aware to intubate patient due to concern for airway protection.  Patient given TXA ceftriaxone PPI bolus PPI drip in ED.  Patient intubated. Received fluids and started on pressors. GI following and plan to do EGD.        (25 Oct 2023 13:46)    MEDICATIONS  (STANDING):  benzocaine 20% Spray 1 Spray(s) Topical three times a day  chlorhexidine 2% Cloths 1 Application(s) Topical <User Schedule>  FIRST- Mouthwash  BLM 5 milliLiter(s) Swish and Spit three times a day  insulin lispro (ADMELOG) corrective regimen sliding scale   SubCutaneous three times a day before meals  lactated ringers. 1000 milliLiter(s) (65 mL/Hr) IV Continuous <Continuous>  lactulose Syrup 20 Gram(s) Oral every 6 hours  losartan 25 milliGRAM(s) Oral daily  metoprolol tartrate 12.5 milliGRAM(s) Oral two times a day  pantoprazole   Suspension 40 milliGRAM(s) Oral two times a day  polyethylene glycol 3350 17 Gram(s) Oral two times a day  QUEtiapine 50 milliGRAM(s) Oral at bedtime  rivaroxaban 20 milliGRAM(s) Oral with dinner  senna 2 Tablet(s) Oral at bedtime    MEDICATIONS  (PRN):  acetaminophen     Tablet .. 650 milliGRAM(s) Oral every 6 hours PRN Moderate Pain (4 - 6)    INTERVAL EVENTS: Patient seen today without distress, complaining of mouth pain?? PO intake has been poor.    T(C): 36.4 (11-03-23 @ 13:28), Max: 36.9 (11-03-23 @ 06:00)  HR: 131 (11-03-23 @ 18:00) (89 - 131)  BP: 153/80 (11-03-23 @ 18:00) (120/82 - 153/80)  RR: 18 (11-03-23 @ 13:28) (18 - 18)  SpO2: --  Wt(kg): --Vital Signs Last 24 Hrs  T(C): 36.4 (03 Nov 2023 13:28), Max: 36.9 (03 Nov 2023 06:00)  T(F): 97.5 (03 Nov 2023 13:28), Max: 98.4 (03 Nov 2023 06:00)  HR: 131 (03 Nov 2023 18:00) (89 - 131)  BP: 153/80 (03 Nov 2023 18:00) (120/82 - 153/80)  BP(mean): --  RR: 18 (03 Nov 2023 13:28) (18 - 18)  SpO2: --        PHYSICAL EXAM:  GENERAL: NAD  NECK: Supple, No JVD  CHEST/LUNG: Clear  HEART: S1, S2, Regular   ABDOMEN: Soft, Nontender, Nondistended; Bowel sounds present  EXTREMITIES: No edema  SKIN: No rashes or lesions    LABS:                        11.6   8.80  )-----------( 447      ( 03 Nov 2023 07:05 )             36.8             11-03    143  |  100  |  9<L>  ----------------------------<  185<H>  3.5   |  24  |  0.7    Ca    9.0      03 Nov 2023 07:05  Phos  3.2     11-03  Mg     1.8     11-03    TPro  6.4  /  Alb  3.4<L>  /  TBili  0.3  /  DBili  x   /  AST  53<H>  /  ALT  29  /  AlkPhos  58  11-03    LIVER FUNCTIONS - ( 03 Nov 2023 07:05 )  Alb: 3.4 g/dL / Pro: 6.4 g/dL / ALK PHOS: 58 U/L / ALT: 29 U/L / AST: 53 U/L / GGT: x                         Urinalysis Basic - ( 03 Nov 2023 07:05 )    Color: x / Appearance: x / SG: x / pH: x  Gluc: 185 mg/dL / Ketone: x  / Bili: x / Urobili: x   Blood: x / Protein: x / Nitrite: x   Leuk Esterase: x / RBC: x / WBC x   Sq Epi: x / Non Sq Epi: x / Bacteria: x          RADIOLOGY & ADDITIONAL TESTS:

## 2023-11-03 NOTE — SWALLOW BEDSIDE ASSESSMENT ADULT - SLP GENERAL OBSERVATIONS
pt received in bed awake confused +garbled speech w/ poor intelligibility. Pt in no apparent pain. +room air
Pt received in bed awake and alert, confused
pt received in bed awake confused +garbled speech w/ poor intelligibility. Pt in no apparent pain. +room air; Staff reports poor PO intake
Pt found laying in bed alert and awake w/ breakfast tray at bedside. Pt significantly confused.

## 2023-11-03 NOTE — PROGRESS NOTE ADULT - SUBJECTIVE AND OBJECTIVE BOX
SUBJECTIVE:  HPI:    88-year-old female with a past medical history significant for diabetes, CVA, atrial fibrillation on Xarelto, HTN,  who presents due to concern for upper GI bleed. Per patient she had epigastric burning sensation this morning when she woke up. This was associated with nausea and multiple episodes of vomiting. Per daughter, patient vomited a cupful of bright red blood with clots (three times).  Patient in route was hemodynamically stable however when presented to the ED patient was noted to be hypotensive and tachycardic.  Patient had a witnessed episode of bright red blood vomiting.  At that point patient became more lethargic.  Patient's daughter at bedside .  Patient and daughter both would like to be kept full code.  At that point made a decision with family aware to intubate patient due to concern for airway protection.  Patient given TXA ceftriaxone PPI bolus PPI drip in ED.  Patient intubated. Received fluids and started on pressors. GI following and plan to do EGD.        (25 Oct 2023 13:46)      Patient is a 88y old Female who presents with a chief complaint of GIB (02 Nov 2023 19:01)    Currently admitted to medicine with the primary diagnosis of Upper GI bleed       Today is hospital day 9d.     PAST MEDICAL & SURGICAL HISTORY  Diabetes    Hypertension    Atrial fibrillation    Cerebrovascular accident (CVA)        ALLERGIES:  No Known Allergies    MEDICATIONS:  ACTIVE MEDICATIONS  acetaminophen     Tablet .. 650 milliGRAM(s) Oral every 6 hours PRN  benzocaine 20% Spray 1 Spray(s) Topical three times a day  chlorhexidine 2% Cloths 1 Application(s) Topical <User Schedule>  FIRST- Mouthwash  BLM 5 milliLiter(s) Swish and Spit three times a day  insulin lispro (ADMELOG) corrective regimen sliding scale   SubCutaneous three times a day before meals  lactated ringers. 1000 milliLiter(s) IV Continuous <Continuous>  lactulose Syrup 20 Gram(s) Oral every 6 hours  losartan 25 milliGRAM(s) Oral daily  metoprolol tartrate 12.5 milliGRAM(s) Oral two times a day  pantoprazole   Suspension 40 milliGRAM(s) Oral two times a day  polyethylene glycol 3350 17 Gram(s) Oral two times a day  QUEtiapine 50 milliGRAM(s) Oral at bedtime  rivaroxaban 20 milliGRAM(s) Oral with dinner  senna 2 Tablet(s) Oral at bedtime      VITALS:   T(F): 97.5  HR: 113  BP: 120/82  RR: 18  SpO2: --    LABS:                        11.6   8.80  )-----------( 447      ( 03 Nov 2023 07:05 )             36.8     11-03    143  |  100  |  9<L>  ----------------------------<  185<H>  3.5   |  24  |  0.7    Ca    9.0      03 Nov 2023 07:05  Phos  3.2     11-03  Mg     1.8     11-03    TPro  6.4  /  Alb  3.4<L>  /  TBili  0.3  /  DBili  x   /  AST  53<H>  /  ALT  29  /  AlkPhos  58  11-03      Urinalysis Basic - ( 03 Nov 2023 07:05 )    Color: x / Appearance: x / SG: x / pH: x  Gluc: 185 mg/dL / Ketone: x  / Bili: x / Urobili: x   Blood: x / Protein: x / Nitrite: x   Leuk Esterase: x / RBC: x / WBC x   Sq Epi: x / Non Sq Epi: x / Bacteria: x                    PHYSICAL EXAM:  GEN: No acute distress  LUNGS: Clear to auscultation bilaterally   HEART: S1/S2 present.    ABD: Soft, non-tender, non-distended.   EXT: No pedal edema  NEURO: AAOX3

## 2023-11-03 NOTE — CHART NOTE - NSCHARTNOTESELECT_GEN_ALL_CORE
Event Note
Gastroenterology/Event Note
GI Nutrition Support/Nutrition Services
Transfer Note

## 2023-11-03 NOTE — SWALLOW BEDSIDE ASSESSMENT ADULT - ORAL PHASE
Delayed oral transit time
Delayed oral transit time
Within functional limits
Within functional limits

## 2023-11-04 ENCOUNTER — TRANSCRIPTION ENCOUNTER (OUTPATIENT)
Age: 88
End: 2023-11-04

## 2023-11-04 LAB
ALBUMIN SERPL ELPH-MCNC: 3.7 G/DL — SIGNIFICANT CHANGE UP (ref 3.5–5.2)
ALBUMIN SERPL ELPH-MCNC: 3.7 G/DL — SIGNIFICANT CHANGE UP (ref 3.5–5.2)
ALP SERPL-CCNC: 56 U/L — SIGNIFICANT CHANGE UP (ref 30–115)
ALP SERPL-CCNC: 56 U/L — SIGNIFICANT CHANGE UP (ref 30–115)
ALT FLD-CCNC: 20 U/L — SIGNIFICANT CHANGE UP (ref 0–41)
ALT FLD-CCNC: 20 U/L — SIGNIFICANT CHANGE UP (ref 0–41)
ANION GAP SERPL CALC-SCNC: 10 MMOL/L — SIGNIFICANT CHANGE UP (ref 7–14)
ANION GAP SERPL CALC-SCNC: 10 MMOL/L — SIGNIFICANT CHANGE UP (ref 7–14)
AST SERPL-CCNC: 18 U/L — SIGNIFICANT CHANGE UP (ref 0–41)
AST SERPL-CCNC: 18 U/L — SIGNIFICANT CHANGE UP (ref 0–41)
BASOPHILS # BLD AUTO: 0.04 K/UL — SIGNIFICANT CHANGE UP (ref 0–0.2)
BASOPHILS # BLD AUTO: 0.04 K/UL — SIGNIFICANT CHANGE UP (ref 0–0.2)
BASOPHILS NFR BLD AUTO: 0.5 % — SIGNIFICANT CHANGE UP (ref 0–1)
BASOPHILS NFR BLD AUTO: 0.5 % — SIGNIFICANT CHANGE UP (ref 0–1)
BILIRUB SERPL-MCNC: 0.3 MG/DL — SIGNIFICANT CHANGE UP (ref 0.2–1.2)
BILIRUB SERPL-MCNC: 0.3 MG/DL — SIGNIFICANT CHANGE UP (ref 0.2–1.2)
BUN SERPL-MCNC: 16 MG/DL — SIGNIFICANT CHANGE UP (ref 10–20)
BUN SERPL-MCNC: 16 MG/DL — SIGNIFICANT CHANGE UP (ref 10–20)
CALCIUM SERPL-MCNC: 9.6 MG/DL — SIGNIFICANT CHANGE UP (ref 8.4–10.4)
CALCIUM SERPL-MCNC: 9.6 MG/DL — SIGNIFICANT CHANGE UP (ref 8.4–10.4)
CHLORIDE SERPL-SCNC: 110 MMOL/L — SIGNIFICANT CHANGE UP (ref 98–110)
CHLORIDE SERPL-SCNC: 110 MMOL/L — SIGNIFICANT CHANGE UP (ref 98–110)
CO2 SERPL-SCNC: 29 MMOL/L — SIGNIFICANT CHANGE UP (ref 17–32)
CO2 SERPL-SCNC: 29 MMOL/L — SIGNIFICANT CHANGE UP (ref 17–32)
CREAT SERPL-MCNC: 1 MG/DL — SIGNIFICANT CHANGE UP (ref 0.7–1.5)
CREAT SERPL-MCNC: 1 MG/DL — SIGNIFICANT CHANGE UP (ref 0.7–1.5)
EGFR: 55 ML/MIN/1.73M2 — LOW
EGFR: 55 ML/MIN/1.73M2 — LOW
EOSINOPHIL # BLD AUTO: 0.07 K/UL — SIGNIFICANT CHANGE UP (ref 0–0.7)
EOSINOPHIL # BLD AUTO: 0.07 K/UL — SIGNIFICANT CHANGE UP (ref 0–0.7)
EOSINOPHIL NFR BLD AUTO: 0.9 % — SIGNIFICANT CHANGE UP (ref 0–8)
EOSINOPHIL NFR BLD AUTO: 0.9 % — SIGNIFICANT CHANGE UP (ref 0–8)
GLUCOSE BLDC GLUCOMTR-MCNC: 199 MG/DL — HIGH (ref 70–99)
GLUCOSE BLDC GLUCOMTR-MCNC: 199 MG/DL — HIGH (ref 70–99)
GLUCOSE BLDC GLUCOMTR-MCNC: 205 MG/DL — HIGH (ref 70–99)
GLUCOSE BLDC GLUCOMTR-MCNC: 205 MG/DL — HIGH (ref 70–99)
GLUCOSE BLDC GLUCOMTR-MCNC: 231 MG/DL — HIGH (ref 70–99)
GLUCOSE BLDC GLUCOMTR-MCNC: 231 MG/DL — HIGH (ref 70–99)
GLUCOSE BLDC GLUCOMTR-MCNC: 95 MG/DL — SIGNIFICANT CHANGE UP (ref 70–99)
GLUCOSE BLDC GLUCOMTR-MCNC: 95 MG/DL — SIGNIFICANT CHANGE UP (ref 70–99)
GLUCOSE SERPL-MCNC: 256 MG/DL — HIGH (ref 70–99)
GLUCOSE SERPL-MCNC: 256 MG/DL — HIGH (ref 70–99)
HCT VFR BLD CALC: 35.8 % — LOW (ref 37–47)
HCT VFR BLD CALC: 35.8 % — LOW (ref 37–47)
HGB BLD-MCNC: 11.3 G/DL — LOW (ref 12–16)
HGB BLD-MCNC: 11.3 G/DL — LOW (ref 12–16)
IMM GRANULOCYTES NFR BLD AUTO: 0.4 % — HIGH (ref 0.1–0.3)
IMM GRANULOCYTES NFR BLD AUTO: 0.4 % — HIGH (ref 0.1–0.3)
LYMPHOCYTES # BLD AUTO: 1.27 K/UL — SIGNIFICANT CHANGE UP (ref 1.2–3.4)
LYMPHOCYTES # BLD AUTO: 1.27 K/UL — SIGNIFICANT CHANGE UP (ref 1.2–3.4)
LYMPHOCYTES # BLD AUTO: 16.7 % — LOW (ref 20.5–51.1)
LYMPHOCYTES # BLD AUTO: 16.7 % — LOW (ref 20.5–51.1)
MAGNESIUM SERPL-MCNC: 2.1 MG/DL — SIGNIFICANT CHANGE UP (ref 1.8–2.4)
MAGNESIUM SERPL-MCNC: 2.1 MG/DL — SIGNIFICANT CHANGE UP (ref 1.8–2.4)
MCHC RBC-ENTMCNC: 28.3 PG — SIGNIFICANT CHANGE UP (ref 27–31)
MCHC RBC-ENTMCNC: 28.3 PG — SIGNIFICANT CHANGE UP (ref 27–31)
MCHC RBC-ENTMCNC: 31.6 G/DL — LOW (ref 32–37)
MCHC RBC-ENTMCNC: 31.6 G/DL — LOW (ref 32–37)
MCV RBC AUTO: 89.5 FL — SIGNIFICANT CHANGE UP (ref 81–99)
MCV RBC AUTO: 89.5 FL — SIGNIFICANT CHANGE UP (ref 81–99)
MONOCYTES # BLD AUTO: 0.95 K/UL — HIGH (ref 0.1–0.6)
MONOCYTES # BLD AUTO: 0.95 K/UL — HIGH (ref 0.1–0.6)
MONOCYTES NFR BLD AUTO: 12.5 % — HIGH (ref 1.7–9.3)
MONOCYTES NFR BLD AUTO: 12.5 % — HIGH (ref 1.7–9.3)
NEUTROPHILS # BLD AUTO: 5.23 K/UL — SIGNIFICANT CHANGE UP (ref 1.4–6.5)
NEUTROPHILS # BLD AUTO: 5.23 K/UL — SIGNIFICANT CHANGE UP (ref 1.4–6.5)
NEUTROPHILS NFR BLD AUTO: 69 % — SIGNIFICANT CHANGE UP (ref 42.2–75.2)
NEUTROPHILS NFR BLD AUTO: 69 % — SIGNIFICANT CHANGE UP (ref 42.2–75.2)
NRBC # BLD: 0 /100 WBCS — SIGNIFICANT CHANGE UP (ref 0–0)
NRBC # BLD: 0 /100 WBCS — SIGNIFICANT CHANGE UP (ref 0–0)
PHOSPHATE SERPL-MCNC: 3.3 MG/DL — SIGNIFICANT CHANGE UP (ref 2.1–4.9)
PHOSPHATE SERPL-MCNC: 3.3 MG/DL — SIGNIFICANT CHANGE UP (ref 2.1–4.9)
PLATELET # BLD AUTO: 441 K/UL — HIGH (ref 130–400)
PLATELET # BLD AUTO: 441 K/UL — HIGH (ref 130–400)
PMV BLD: 10 FL — SIGNIFICANT CHANGE UP (ref 7.4–10.4)
PMV BLD: 10 FL — SIGNIFICANT CHANGE UP (ref 7.4–10.4)
POTASSIUM SERPL-MCNC: 4.3 MMOL/L — SIGNIFICANT CHANGE UP (ref 3.5–5)
POTASSIUM SERPL-MCNC: 4.3 MMOL/L — SIGNIFICANT CHANGE UP (ref 3.5–5)
POTASSIUM SERPL-SCNC: 4.3 MMOL/L — SIGNIFICANT CHANGE UP (ref 3.5–5)
POTASSIUM SERPL-SCNC: 4.3 MMOL/L — SIGNIFICANT CHANGE UP (ref 3.5–5)
PROT SERPL-MCNC: 6.4 G/DL — SIGNIFICANT CHANGE UP (ref 6–8)
PROT SERPL-MCNC: 6.4 G/DL — SIGNIFICANT CHANGE UP (ref 6–8)
RBC # BLD: 4 M/UL — LOW (ref 4.2–5.4)
RBC # BLD: 4 M/UL — LOW (ref 4.2–5.4)
RBC # FLD: 16.2 % — HIGH (ref 11.5–14.5)
RBC # FLD: 16.2 % — HIGH (ref 11.5–14.5)
SODIUM SERPL-SCNC: 149 MMOL/L — HIGH (ref 135–146)
SODIUM SERPL-SCNC: 149 MMOL/L — HIGH (ref 135–146)
WBC # BLD: 7.59 K/UL — SIGNIFICANT CHANGE UP (ref 4.8–10.8)
WBC # BLD: 7.59 K/UL — SIGNIFICANT CHANGE UP (ref 4.8–10.8)
WBC # FLD AUTO: 7.59 K/UL — SIGNIFICANT CHANGE UP (ref 4.8–10.8)
WBC # FLD AUTO: 7.59 K/UL — SIGNIFICANT CHANGE UP (ref 4.8–10.8)

## 2023-11-04 RX ORDER — LOSARTAN POTASSIUM 100 MG/1
1 TABLET, FILM COATED ORAL
Qty: 90 | Refills: 0
Start: 2023-11-04 | End: 2024-02-01

## 2023-11-04 RX ORDER — METOPROLOL TARTRATE 100 MG/1
1 TABLET ORAL
Qty: 90 | Refills: 0 | DISCHARGE
Start: 2023-11-04 | End: 2024-02-01

## 2023-11-04 RX ORDER — METOPROLOL TARTRATE 50 MG
0.5 TABLET ORAL
Qty: 90 | Refills: 0
Start: 2023-11-04 | End: 2024-02-01

## 2023-11-04 RX ORDER — RIVAROXABAN 15 MG-20MG
1 KIT ORAL
Refills: 0 | DISCHARGE

## 2023-11-04 RX ORDER — APIXABAN 2.5 MG/1
1 TABLET, FILM COATED ORAL
Qty: 180 | Refills: 0
Start: 2023-11-04 | End: 2024-02-01

## 2023-11-04 RX ORDER — APIXABAN 2.5 MG/1
2.5 TABLET, FILM COATED ORAL
Refills: 0 | Status: DISCONTINUED | OUTPATIENT
Start: 2023-11-04 | End: 2023-11-05

## 2023-11-04 RX ORDER — TRAMADOL HYDROCHLORIDE 50 MG/1
1 TABLET ORAL
Refills: 0 | DISCHARGE

## 2023-11-04 RX ADMIN — POLYETHYLENE GLYCOL 3350 17 GRAM(S): 17 POWDER, FOR SOLUTION ORAL at 05:29

## 2023-11-04 RX ADMIN — Medication 650 MILLIGRAM(S): at 05:29

## 2023-11-04 RX ADMIN — Medication 2: at 12:28

## 2023-11-04 RX ADMIN — PANTOPRAZOLE SODIUM 40 MILLIGRAM(S): 20 TABLET, DELAYED RELEASE ORAL at 17:33

## 2023-11-04 RX ADMIN — Medication 1: at 08:33

## 2023-11-04 RX ADMIN — LACTULOSE 20 GRAM(S): 10 SOLUTION ORAL at 12:29

## 2023-11-04 RX ADMIN — CHLORHEXIDINE GLUCONATE 1 APPLICATION(S): 213 SOLUTION TOPICAL at 05:30

## 2023-11-04 RX ADMIN — LOSARTAN POTASSIUM 25 MILLIGRAM(S): 100 TABLET, FILM COATED ORAL at 05:29

## 2023-11-04 RX ADMIN — LACTULOSE 20 GRAM(S): 10 SOLUTION ORAL at 17:36

## 2023-11-04 RX ADMIN — Medication 12.5 MILLIGRAM(S): at 17:36

## 2023-11-04 RX ADMIN — LACTULOSE 20 GRAM(S): 10 SOLUTION ORAL at 05:28

## 2023-11-04 RX ADMIN — Medication 12.5 MILLIGRAM(S): at 05:28

## 2023-11-04 RX ADMIN — APIXABAN 2.5 MILLIGRAM(S): 2.5 TABLET, FILM COATED ORAL at 17:32

## 2023-11-04 RX ADMIN — Medication 1 SPRAY(S): at 05:38

## 2023-11-04 RX ADMIN — DIPHENHYDRAMINE HYDROCHLORIDE AND LIDOCAINE HYDROCHLORIDE AND ALUMINUM HYDROXIDE AND MAGNESIUM HYDRO 5 MILLILITER(S): KIT at 12:29

## 2023-11-04 RX ADMIN — Medication 1 SPRAY(S): at 12:30

## 2023-11-04 RX ADMIN — DIPHENHYDRAMINE HYDROCHLORIDE AND LIDOCAINE HYDROCHLORIDE AND ALUMINUM HYDROXIDE AND MAGNESIUM HYDRO 5 MILLILITER(S): KIT at 05:30

## 2023-11-04 RX ADMIN — Medication 650 MILLIGRAM(S): at 20:33

## 2023-11-04 RX ADMIN — QUETIAPINE FUMARATE 50 MILLIGRAM(S): 200 TABLET, FILM COATED ORAL at 22:18

## 2023-11-04 RX ADMIN — PANTOPRAZOLE SODIUM 40 MILLIGRAM(S): 20 TABLET, DELAYED RELEASE ORAL at 05:29

## 2023-11-04 NOTE — DISCHARGE NOTE NURSING/CASE MANAGEMENT/SOCIAL WORK - NSDCPEFALRISK_GEN_ALL_CORE
For information on Fall & Injury Prevention, visit: [email or web address] OR  [email or web address] OR  https://www.cdc.gov/steadi/patient.html

## 2023-11-04 NOTE — DISCHARGE NOTE PROVIDER - NSDCFUSCHEDAPPT_GEN_ALL_CORE_FT
[facility]  [facility] [address]  Scheduled Appointment: 11/27/2023

## 2023-11-04 NOTE — PROGRESS NOTE ADULT - SUBJECTIVE AND OBJECTIVE BOX
CRISSY BRINACONSTANTIN  88y  Female      Patient is a 88y old  Female who presents with a chief complaint of GIB (03 Nov 2023 18:53)        REVIEW OF SYSTEMS:  CONSTITUTIONAL: No fever, weight loss, or fatigue  EYES: No eye pain, visual disturbances, or discharge  ENMT:  No difficulty hearing, tinnitus, vertigo; No sinus or throat pain  NECK: No pain or stiffness  RESPIRATORY: No cough, wheezing, chills or hemoptysis; No shortness of breath  CARDIOVASCULAR: No chest pain, palpitations, dizziness, or leg swelling  GASTROINTESTINAL: No abdominal or epigastric pain. No nausea, vomiting, or hematemesis; No diarrhea or constipation. No melena or hematochezia.  GENITOURINARY: No dysuria, frequency, hematuria,  incontinence+  MUSCULOSKELETAL: No joint pain or swelling; No muscle, back, or extremity pain  PSYCHIATRIC: No depression, anxiety, mood swings, or difficulty sleeping  HEME/LYMPH: No easy bruising, or bleeding gums  ALLERY AND IMMUNOLOGIC: No hives or eczema  FAMILY HISTORY:    T(C): 36.8 (11-04-23 @ 05:00), Max: 36.8 (11-04-23 @ 05:00)  HR: 98 (11-04-23 @ 05:00) (98 - 131)  BP: 166/98 (11-04-23 @ 05:00) (120/82 - 166/98)  RR: 20 (11-04-23 @ 05:00) (18 - 20)  SpO2: --  Wt(kg): --Vital Signs Last 24 Hrs  T(C): 36.8 (04 Nov 2023 05:00), Max: 36.8 (04 Nov 2023 05:00)  T(F): 98.3 (04 Nov 2023 05:00), Max: 98.3 (04 Nov 2023 05:00)  HR: 98 (04 Nov 2023 05:00) (98 - 131)  BP: 166/98 (04 Nov 2023 05:00) (120/82 - 166/98)  BP(mean): --  RR: 20 (04 Nov 2023 05:00) (18 - 20)  SpO2: --      No Known Allergies      PHYSICAL EXAM:  GENERAL: NAD,   HEAD:  Atraumatic, Normocephalic  EYES: EOMI, PERRLA, conjunctiva and sclera clear  ENMT: No tonsillar erythema, exudates, or enlargement;   NECK: Supple, No JVD, Normal thyroid  NERVOUS SYSTEM:  Alert & Oriented   CHEST/LUNG: Clear to percussion bilaterally; No rales, rhonchi, wheezing, or rubs  HEART: Regular rate and rhythm; No murmurs, rubs, or gallops  ABDOMEN: Soft, Nontender, Nondistended; Bowel sounds present  EXTREMITIES:  , No clubbing, cyanosis, or edema  LYMPH: No lymphadenopathy noted  SKIN: No rashes or lesions      LABS:  11-04    149<H>  |  110  |  16  ----------------------------<  256<H>  4.3   |  29  |  1.0    Ca    9.6      04 Nov 2023 07:37  Phos  3.3     11-04  Mg     2.1     11-04    TPro  6.4  /  Alb  3.7  /  TBili  0.3  /  DBili  x   /  AST  18  /  ALT  20  /  AlkPhos  56  11-04                          11.3   7.59  )-----------( 441      ( 04 Nov 2023 07:37 )             35.8     Normal mucosa in the whole esophagus.    Erosive gastritis. Erythematous oozing lesions in the stomach body concerning  for Dieulafoy lesions (endoclips x 4).    A large amount of coffee ground material and blood was seen in the duodenal  bulb and second part of duodenum. After irrigation, cleaning, and carefully  examining under water, there was no evidence of active bleeding or ulcer. .    < from: [facility] Duplex Lower Ext Vein Scan, Bilat (10.26.23 @ 14:49) >  No evidence of deep venous thrombosis or superficial thrombophlebitis in   the bilateral lower extremities.    < end of copied text >  < from: [facility] Duplex Upper Ext Vein Scan, Right (10.30.23 @ 19:12) >  IMPRESSION:  No evidence of deep vein thrombosis in right upper extremity.  Superficial thrombophlebitis visualized in cephalic vein.      < end of copied text >  < from: 12 Lead ECG (11.03.23 @ 09:53) >    Diagnosis Line Sinus tachycardia with short LA with Premature supraventricular complexes  Nonspecific ST abnormality  Abnormal ECG      < end of copied text >      RADIOLOGY & ADDITIONAL TESTS:    MEDICATION:  acetaminophen     Tablet .. 650 milliGRAM(s) Oral every 6 hours PRN  benzocaine 20% Spray 1 Spray(s) Topical three times a day  chlorhexidine 2% Cloths 1 Application(s) Topical <User Schedule>  FIRST- Mouthwash  BLM 5 milliLiter(s) Swish and Spit three times a day  insulin lispro (ADMELOG) corrective regimen sliding scale   SubCutaneous three times a day before meals  lactated ringers. 1000 milliLiter(s) IV Continuous <Continuous>  lactulose Syrup 20 Gram(s) Oral every 6 hours  losartan 25 milliGRAM(s) Oral daily  metoprolol tartrate 12.5 milliGRAM(s) Oral two times a day  pantoprazole   Suspension 40 milliGRAM(s) Oral two times a day  polyethylene glycol 3350 17 Gram(s) Oral two times a day  QUEtiapine 50 milliGRAM(s) Oral at bedtime  rivaroxaban 20 milliGRAM(s) Oral with dinner  senna 2 Tablet(s) Oral at bedtime      HEALTH ISSUES - PROBLEM Dx:  acute anemia due to GI bleeding s/p PRBC  Type 2 DM on insulin  hx PAF now nsr with her age will change to eliquis 2.5mg bid  GERD on protonix   Dysphagia improving poor appetite encourage po inmtake will send her back to [facility] today

## 2023-11-04 NOTE — DISCHARGE NOTE NURSING/CASE MANAGEMENT/SOCIAL WORK - PATIENT PORTAL LINK FT
You can access the FollowMyHealth Patient Portal offered by [facility] by registering at the following website: [email or web address]. By joining [facility]’s FollowMyHealth portal, you will also be able to view your health information using other applications (apps) compatible with our system.

## 2023-11-04 NOTE — DISCHARGE NOTE PROVIDER - NSDCMRMEDTOKEN_GEN_ALL_CORE_FT
aluminum hydroxide/magnesium hydroxide/simethicone 200 mg-225 mg-20 mg/5 mL oral suspension: 20 milliliter(s) orally 2 times a day as needed for dyspepsia  BuSpar 5 mg oral tablet: 1 tab(s) orally 2 times a day  Depakote 125 mg oral delayed release tablet: 1 tab(s) orally 3 times a day  famotidine 20 mg oral tablet: 1 tab(s) orally 2 times a day  LORazepam 0.5 mg oral tablet: 0.5 tab(s) orally 2 times a day  metFORMIN 500 mg oral tablet: 1 tab(s) orally 2 times a day  MiraLax oral powder for reconstitution: 17 each orally once a day  Neurontin 100 mg oral capsule: 2 cap(s) orally 3 times a day  Senna Plus 50 mg-8.6 mg oral tablet: 2 tab(s) orally once a day (at bedtime)  traMADol 50 mg oral tablet: 1 tab(s) orally 2 times a day  Tylenol 325 mg oral tablet: 2 tab(s) orally every 6 hours as needed for  mild pain  Vitamin D3 1250 mcg (50,000 intl units) oral capsule: 1 cap(s) orally once a month  Xarelto 10 mg oral tablet: 1 tab(s) orally once a day   aluminum hydroxide/magnesium hydroxide/simethicone 200 mg-225 mg-20 mg/5 mL oral suspension: 20 milliliter(s) orally 2 times a day as needed for dyspepsia  BuSpar 5 mg oral tablet: 1 tab(s) orally 2 times a day  Depakote 125 mg oral delayed release tablet: 1 tab(s) orally 3 times a day  Eliquis 2.5 mg oral tablet: 1 tab(s) orally 2 times a day  famotidine 20 mg oral tablet: 1 tab(s) orally 2 times a day  LORazepam 0.5 mg oral tablet: 0.5 tab(s) orally 2 times a day  losartan 25 mg oral tablet: 1 tab(s) orally once a day  metFORMIN 500 mg oral tablet: 1 tab(s) orally 2 times a day  Metoprolol Tartrate 25 mg oral tablet: 0.5 tab(s) orally 2 times a day  MiraLax oral powder for reconstitution: 17 each orally once a day  Neurontin 100 mg oral capsule: 2 cap(s) orally 3 times a day  Senna Plus 50 mg-8.6 mg oral tablet: 2 tab(s) orally once a day (at bedtime)  Tylenol 325 mg oral tablet: 2 tab(s) orally every 6 hours as needed for  mild pain  Vitamin D3 1250 mcg (50,000 intl units) oral capsule: 1 cap(s) orally once a month

## 2023-11-05 VITALS
RESPIRATION RATE: 18 BRPM | DIASTOLIC BLOOD PRESSURE: 76 MMHG | OXYGEN SATURATION: 98 % | TEMPERATURE: 97 F | HEART RATE: 94 BPM | SYSTOLIC BLOOD PRESSURE: 140 MMHG

## 2023-11-05 LAB
ALBUMIN SERPL ELPH-MCNC: 3.3 G/DL — LOW (ref 3.5–5.2)
ALBUMIN SERPL ELPH-MCNC: 3.3 G/DL — LOW (ref 3.5–5.2)
ALP SERPL-CCNC: 54 U/L — SIGNIFICANT CHANGE UP (ref 30–115)
ALP SERPL-CCNC: 54 U/L — SIGNIFICANT CHANGE UP (ref 30–115)
ALT FLD-CCNC: 14 U/L — SIGNIFICANT CHANGE UP (ref 0–41)
ALT FLD-CCNC: 14 U/L — SIGNIFICANT CHANGE UP (ref 0–41)
ANION GAP SERPL CALC-SCNC: 11 MMOL/L — SIGNIFICANT CHANGE UP (ref 7–14)
ANION GAP SERPL CALC-SCNC: 11 MMOL/L — SIGNIFICANT CHANGE UP (ref 7–14)
AST SERPL-CCNC: 13 U/L — SIGNIFICANT CHANGE UP (ref 0–41)
AST SERPL-CCNC: 13 U/L — SIGNIFICANT CHANGE UP (ref 0–41)
BASOPHILS # BLD AUTO: 0.06 K/UL — SIGNIFICANT CHANGE UP (ref 0–0.2)
BASOPHILS # BLD AUTO: 0.06 K/UL — SIGNIFICANT CHANGE UP (ref 0–0.2)
BASOPHILS NFR BLD AUTO: 0.7 % — SIGNIFICANT CHANGE UP (ref 0–1)
BASOPHILS NFR BLD AUTO: 0.7 % — SIGNIFICANT CHANGE UP (ref 0–1)
BILIRUB SERPL-MCNC: 0.3 MG/DL — SIGNIFICANT CHANGE UP (ref 0.2–1.2)
BILIRUB SERPL-MCNC: 0.3 MG/DL — SIGNIFICANT CHANGE UP (ref 0.2–1.2)
BUN SERPL-MCNC: 16 MG/DL — SIGNIFICANT CHANGE UP (ref 10–20)
BUN SERPL-MCNC: 16 MG/DL — SIGNIFICANT CHANGE UP (ref 10–20)
CALCIUM SERPL-MCNC: 8.6 MG/DL — SIGNIFICANT CHANGE UP (ref 8.4–10.4)
CALCIUM SERPL-MCNC: 8.6 MG/DL — SIGNIFICANT CHANGE UP (ref 8.4–10.4)
CHLORIDE SERPL-SCNC: 106 MMOL/L — SIGNIFICANT CHANGE UP (ref 98–110)
CHLORIDE SERPL-SCNC: 106 MMOL/L — SIGNIFICANT CHANGE UP (ref 98–110)
CO2 SERPL-SCNC: 26 MMOL/L — SIGNIFICANT CHANGE UP (ref 17–32)
CO2 SERPL-SCNC: 26 MMOL/L — SIGNIFICANT CHANGE UP (ref 17–32)
CREAT SERPL-MCNC: 0.8 MG/DL — SIGNIFICANT CHANGE UP (ref 0.7–1.5)
CREAT SERPL-MCNC: 0.8 MG/DL — SIGNIFICANT CHANGE UP (ref 0.7–1.5)
EGFR: 72 ML/MIN/1.73M2 — SIGNIFICANT CHANGE UP
EGFR: 72 ML/MIN/1.73M2 — SIGNIFICANT CHANGE UP
EOSINOPHIL # BLD AUTO: 0.17 K/UL — SIGNIFICANT CHANGE UP (ref 0–0.7)
EOSINOPHIL # BLD AUTO: 0.17 K/UL — SIGNIFICANT CHANGE UP (ref 0–0.7)
EOSINOPHIL NFR BLD AUTO: 2 % — SIGNIFICANT CHANGE UP (ref 0–8)
EOSINOPHIL NFR BLD AUTO: 2 % — SIGNIFICANT CHANGE UP (ref 0–8)
GLUCOSE BLDC GLUCOMTR-MCNC: 157 MG/DL — HIGH (ref 70–99)
GLUCOSE BLDC GLUCOMTR-MCNC: 157 MG/DL — HIGH (ref 70–99)
GLUCOSE BLDC GLUCOMTR-MCNC: 165 MG/DL — HIGH (ref 70–99)
GLUCOSE BLDC GLUCOMTR-MCNC: 165 MG/DL — HIGH (ref 70–99)
GLUCOSE BLDC GLUCOMTR-MCNC: 180 MG/DL — HIGH (ref 70–99)
GLUCOSE BLDC GLUCOMTR-MCNC: 180 MG/DL — HIGH (ref 70–99)
GLUCOSE BLDC GLUCOMTR-MCNC: 229 MG/DL — HIGH (ref 70–99)
GLUCOSE BLDC GLUCOMTR-MCNC: 229 MG/DL — HIGH (ref 70–99)
GLUCOSE SERPL-MCNC: 219 MG/DL — HIGH (ref 70–99)
GLUCOSE SERPL-MCNC: 219 MG/DL — HIGH (ref 70–99)
HCT VFR BLD CALC: 33 % — LOW (ref 37–47)
HCT VFR BLD CALC: 33 % — LOW (ref 37–47)
HGB BLD-MCNC: 10.3 G/DL — LOW (ref 12–16)
HGB BLD-MCNC: 10.3 G/DL — LOW (ref 12–16)
IMM GRANULOCYTES NFR BLD AUTO: 0.3 % — SIGNIFICANT CHANGE UP (ref 0.1–0.3)
IMM GRANULOCYTES NFR BLD AUTO: 0.3 % — SIGNIFICANT CHANGE UP (ref 0.1–0.3)
LYMPHOCYTES # BLD AUTO: 1.81 K/UL — SIGNIFICANT CHANGE UP (ref 1.2–3.4)
LYMPHOCYTES # BLD AUTO: 1.81 K/UL — SIGNIFICANT CHANGE UP (ref 1.2–3.4)
LYMPHOCYTES # BLD AUTO: 20.9 % — SIGNIFICANT CHANGE UP (ref 20.5–51.1)
LYMPHOCYTES # BLD AUTO: 20.9 % — SIGNIFICANT CHANGE UP (ref 20.5–51.1)
MAGNESIUM SERPL-MCNC: 1.8 MG/DL — SIGNIFICANT CHANGE UP (ref 1.8–2.4)
MAGNESIUM SERPL-MCNC: 1.8 MG/DL — SIGNIFICANT CHANGE UP (ref 1.8–2.4)
MCHC RBC-ENTMCNC: 27.6 PG — SIGNIFICANT CHANGE UP (ref 27–31)
MCHC RBC-ENTMCNC: 27.6 PG — SIGNIFICANT CHANGE UP (ref 27–31)
MCHC RBC-ENTMCNC: 31.2 G/DL — LOW (ref 32–37)
MCHC RBC-ENTMCNC: 31.2 G/DL — LOW (ref 32–37)
MCV RBC AUTO: 88.5 FL — SIGNIFICANT CHANGE UP (ref 81–99)
MCV RBC AUTO: 88.5 FL — SIGNIFICANT CHANGE UP (ref 81–99)
MONOCYTES # BLD AUTO: 0.81 K/UL — HIGH (ref 0.1–0.6)
MONOCYTES # BLD AUTO: 0.81 K/UL — HIGH (ref 0.1–0.6)
MONOCYTES NFR BLD AUTO: 9.3 % — SIGNIFICANT CHANGE UP (ref 1.7–9.3)
MONOCYTES NFR BLD AUTO: 9.3 % — SIGNIFICANT CHANGE UP (ref 1.7–9.3)
NEUTROPHILS # BLD AUTO: 5.79 K/UL — SIGNIFICANT CHANGE UP (ref 1.4–6.5)
NEUTROPHILS # BLD AUTO: 5.79 K/UL — SIGNIFICANT CHANGE UP (ref 1.4–6.5)
NEUTROPHILS NFR BLD AUTO: 66.8 % — SIGNIFICANT CHANGE UP (ref 42.2–75.2)
NEUTROPHILS NFR BLD AUTO: 66.8 % — SIGNIFICANT CHANGE UP (ref 42.2–75.2)
NRBC # BLD: 0 /100 WBCS — SIGNIFICANT CHANGE UP (ref 0–0)
NRBC # BLD: 0 /100 WBCS — SIGNIFICANT CHANGE UP (ref 0–0)
PHOSPHATE SERPL-MCNC: 3.4 MG/DL — SIGNIFICANT CHANGE UP (ref 2.1–4.9)
PHOSPHATE SERPL-MCNC: 3.4 MG/DL — SIGNIFICANT CHANGE UP (ref 2.1–4.9)
PLATELET # BLD AUTO: 450 K/UL — HIGH (ref 130–400)
PLATELET # BLD AUTO: 450 K/UL — HIGH (ref 130–400)
PMV BLD: 10.2 FL — SIGNIFICANT CHANGE UP (ref 7.4–10.4)
PMV BLD: 10.2 FL — SIGNIFICANT CHANGE UP (ref 7.4–10.4)
POTASSIUM SERPL-MCNC: 3.8 MMOL/L — SIGNIFICANT CHANGE UP (ref 3.5–5)
POTASSIUM SERPL-MCNC: 3.8 MMOL/L — SIGNIFICANT CHANGE UP (ref 3.5–5)
POTASSIUM SERPL-SCNC: 3.8 MMOL/L — SIGNIFICANT CHANGE UP (ref 3.5–5)
POTASSIUM SERPL-SCNC: 3.8 MMOL/L — SIGNIFICANT CHANGE UP (ref 3.5–5)
PROT SERPL-MCNC: 5.9 G/DL — LOW (ref 6–8)
PROT SERPL-MCNC: 5.9 G/DL — LOW (ref 6–8)
RBC # BLD: 3.73 M/UL — LOW (ref 4.2–5.4)
RBC # BLD: 3.73 M/UL — LOW (ref 4.2–5.4)
RBC # FLD: 16.2 % — HIGH (ref 11.5–14.5)
RBC # FLD: 16.2 % — HIGH (ref 11.5–14.5)
SODIUM SERPL-SCNC: 143 MMOL/L — SIGNIFICANT CHANGE UP (ref 135–146)
SODIUM SERPL-SCNC: 143 MMOL/L — SIGNIFICANT CHANGE UP (ref 135–146)
WBC # BLD: 8.67 K/UL — SIGNIFICANT CHANGE UP (ref 4.8–10.8)
WBC # BLD: 8.67 K/UL — SIGNIFICANT CHANGE UP (ref 4.8–10.8)
WBC # FLD AUTO: 8.67 K/UL — SIGNIFICANT CHANGE UP (ref 4.8–10.8)
WBC # FLD AUTO: 8.67 K/UL — SIGNIFICANT CHANGE UP (ref 4.8–10.8)

## 2023-11-05 RX ADMIN — SENNA PLUS 2 TABLET(S): 8.6 TABLET ORAL at 21:09

## 2023-11-05 RX ADMIN — APIXABAN 2.5 MILLIGRAM(S): 2.5 TABLET, FILM COATED ORAL at 06:44

## 2023-11-05 RX ADMIN — Medication 2: at 16:59

## 2023-11-05 RX ADMIN — CHLORHEXIDINE GLUCONATE 1 APPLICATION(S): 213 SOLUTION TOPICAL at 06:39

## 2023-11-05 RX ADMIN — QUETIAPINE FUMARATE 50 MILLIGRAM(S): 200 TABLET, FILM COATED ORAL at 21:09

## 2023-11-05 RX ADMIN — Medication 12.5 MILLIGRAM(S): at 06:37

## 2023-11-05 RX ADMIN — Medication 1 SPRAY(S): at 06:38

## 2023-11-05 RX ADMIN — DIPHENHYDRAMINE HYDROCHLORIDE AND LIDOCAINE HYDROCHLORIDE AND ALUMINUM HYDROXIDE AND MAGNESIUM HYDRO 5 MILLILITER(S): KIT at 21:09

## 2023-11-05 RX ADMIN — DIPHENHYDRAMINE HYDROCHLORIDE AND LIDOCAINE HYDROCHLORIDE AND ALUMINUM HYDROXIDE AND MAGNESIUM HYDRO 5 MILLILITER(S): KIT at 06:38

## 2023-11-05 RX ADMIN — Medication 1: at 08:17

## 2023-11-05 RX ADMIN — PANTOPRAZOLE SODIUM 40 MILLIGRAM(S): 20 TABLET, DELAYED RELEASE ORAL at 17:01

## 2023-11-05 RX ADMIN — LOSARTAN POTASSIUM 25 MILLIGRAM(S): 100 TABLET, FILM COATED ORAL at 06:37

## 2023-11-05 RX ADMIN — APIXABAN 2.5 MILLIGRAM(S): 2.5 TABLET, FILM COATED ORAL at 17:00

## 2023-11-05 RX ADMIN — Medication 12.5 MILLIGRAM(S): at 17:00

## 2023-11-05 RX ADMIN — Medication 650 MILLIGRAM(S): at 06:44

## 2023-11-05 RX ADMIN — PANTOPRAZOLE SODIUM 40 MILLIGRAM(S): 20 TABLET, DELAYED RELEASE ORAL at 06:37

## 2023-11-05 RX ADMIN — Medication 1: at 11:26

## 2023-11-05 NOTE — PROGRESS NOTE ADULT - SUBJECTIVE AND OBJECTIVE BOX
CRISSY BRINACONSTANTIN  88y  Female      Patient is a 88y old  Female who presents with a chief complaint of GIB (04 Nov 2023 10:21)        REVIEW OF SYSTEMS:  CONSTITUTIONAL: No fever, weight loss, or fatigue  EYES: No eye pain, visual disturbances, or discharge  ENMT:  No difficulty hearing, tinnitus, vertigo; No sinus or throat pain  NECK: No pain or stiffness  RESPIRATORY: No cough, wheezing, chills or hemoptysis; No shortness of breath  CARDIOVASCULAR: No chest pain, palpitations, dizziness, or leg swelling  GASTROINTESTINAL: No abdominal or epigastric pain. No nausea, vomiting, or hematemesis; No diarrhea or constipation. No melena or hematochezia.  GENITOURINARY: No dysuria, frequency, hematuria,  incontinence+  MUSCULOSKELETAL: No joint pain or swelling; No muscle, back, or extremity pain  PSYCHIATRIC: No depression, anxiety, mood swings, or difficulty sleeping  HEME/LYMPH: No easy bruising, or bleeding gums  ALLERY AND IMMUNOLOGIC: No hives or eczema  FAMILY HISTORY:    T(C): 36.8 (11-05-23 @ 03:57), Max: 36.8 (11-05-23 @ 03:57)  HR: 96 (11-05-23 @ 03:57) (94 - 108)  BP: 136/80 (11-05-23 @ 03:57) (105/52 - 145/90)  RR: 18 (11-05-23 @ 03:57) (18 - 18)  SpO2: --  Wt(kg): --Vital Signs Last 24 Hrs  T(C): 36.8 (05 Nov 2023 03:57), Max: 36.8 (05 Nov 2023 03:57)  T(F): 98.2 (05 Nov 2023 03:57), Max: 98.2 (05 Nov 2023 03:57)  HR: 96 (05 Nov 2023 03:57) (94 - 108)  BP: 136/80 (05 Nov 2023 03:57) (105/52 - 145/90)  BP(mean): --  RR: 18 (05 Nov 2023 03:57) (18 - 18)  SpO2: --      No Known Allergies      PHYSICAL EXAM:  GENERAL: NAD,  HEAD:  Atraumatic, Normocephalic  EYES: EOMI, PERRLA, conjunctiva and sclera clear  ENMT: No tonsillar erythema, exudates, or enlargement;   NECK: Supple, No JVD, Normal thyroid  NERVOUS SYSTEM:  Alert & Oriented  CHEST/LUNG: Clear to percussion bilaterally; No rales, rhonchi, wheezing, or rubs  HEART: Regular rate and rhythm; No murmurs, rubs, or gallops  ABDOMEN: Soft, Nontender, Nondistended; Bowel sounds present  EXTREMITIES:  , No clubbing, cyanosis, or edema  LYMPH: No lymphadenopathy noted  SKIN: No rashes or lesions      LABS:  11-04    149<H>  |  110  |  16  ----------------------------<  256<H>  4.3   |  29  |  1.0    Ca    9.6      04 Nov 2023 07:37  Phos  3.3     11-04  Mg     2.1     11-04    TPro  6.4  /  Alb  3.7  /  TBili  0.3  /  DBili  x   /  AST  18  /  ALT  20  /  AlkPhos  56  11-04                          10.3   8.67  )-----------( 450      ( 05 Nov 2023 07:23 )             33.0         RADIOLOGY & ADDITIONAL TESTS:    MEDICATION:  < from: EGD (10.25.23 @ 18:26) >  Normal mucosa in the whole esophagus.    Erosive gastritis. Erythematous oozing lesions in the stomach body concerning  for Dieulafoy lesions (endoclips x 4).    A large amount of coffee ground material and blood was seen in the duodenal  bulb and second part of duodenum. After irrigation, cleaning, and carefully  examining under water, there was no evidence of active bleeding or ulcer. .    Plan:      < end of copied text >  acetaminophen     Tablet .. 650 milliGRAM(s) Oral every 6 hours PRN  apixaban 2.5 milliGRAM(s) Oral two times a day  benzocaine 20% Spray 1 Spray(s) Topical three times a day  chlorhexidine 2% Cloths 1 Application(s) Topical <User Schedule>  FIRST- Mouthwash  BLM 5 milliLiter(s) Swish and Spit three times a day  insulin lispro (ADMELOG) corrective regimen sliding scale   SubCutaneous three times a day before meals  lactated ringers. 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

## 2023-11-05 NOTE — PROGRESS NOTE ADULT - PROVIDER SPECIALTY LIST ADULT
Internal Medicine
Pulmonology
CCU
Pulmonology
CCU
Gastroenterology
Gastroenterology
Internal Medicine
Critical Care
Critical Care
Internal Medicine
Pulmonology
Pulmonology

## 2023-11-10 ENCOUNTER — INPATIENT (INPATIENT)
Facility: HOSPITAL | Age: 88
LOS: 2 days | Discharge: HOME | DRG: 812 | End: 2023-11-13
Attending: STUDENT IN AN ORGANIZED HEALTH CARE EDUCATION/TRAINING PROGRAM | Admitting: STUDENT IN AN ORGANIZED HEALTH CARE EDUCATION/TRAINING PROGRAM
Payer: MEDICARE

## 2023-11-10 VITALS
HEIGHT: 65 IN | HEART RATE: 92 BPM | RESPIRATION RATE: 17 BRPM | TEMPERATURE: 98 F | SYSTOLIC BLOOD PRESSURE: 120 MMHG | OXYGEN SATURATION: 99 % | WEIGHT: 160.06 LBS | DIASTOLIC BLOOD PRESSURE: 67 MMHG

## 2023-11-10 PROCEDURE — 99285 EMERGENCY DEPT VISIT HI MDM: CPT

## 2023-11-10 PROCEDURE — 99053 MED SERV 10PM-8AM 24 HR FAC: CPT

## 2023-11-10 PROCEDURE — 93010 ELECTROCARDIOGRAM REPORT: CPT

## 2023-11-10 NOTE — ED ADULT TRIAGE NOTE - CHIEF COMPLAINT QUOTE
She's had a headache for 5 hours, her daughter sad she had chest pains. she denies it. We did a 12 lead, everything is fine. She - EMS  Patient was in ICU 5 days prior for GI bleed, on Xarelto for A-fib. Patient c/o  abdominal pain and headache

## 2023-11-11 DIAGNOSIS — K92.2 GASTROINTESTINAL HEMORRHAGE, UNSPECIFIED: ICD-10-CM

## 2023-11-11 LAB
ALBUMIN SERPL ELPH-MCNC: 3 G/DL — LOW (ref 3.5–5.2)
ALBUMIN SERPL ELPH-MCNC: 3 G/DL — LOW (ref 3.5–5.2)
ALBUMIN SERPL ELPH-MCNC: 3.3 G/DL — LOW (ref 3.5–5.2)
ALBUMIN SERPL ELPH-MCNC: 3.3 G/DL — LOW (ref 3.5–5.2)
ALP SERPL-CCNC: 42 U/L — SIGNIFICANT CHANGE UP (ref 30–115)
ALP SERPL-CCNC: 42 U/L — SIGNIFICANT CHANGE UP (ref 30–115)
ALP SERPL-CCNC: 54 U/L — SIGNIFICANT CHANGE UP (ref 30–115)
ALP SERPL-CCNC: 54 U/L — SIGNIFICANT CHANGE UP (ref 30–115)
ALT FLD-CCNC: 7 U/L — SIGNIFICANT CHANGE UP (ref 0–41)
ALT FLD-CCNC: 7 U/L — SIGNIFICANT CHANGE UP (ref 0–41)
ALT FLD-CCNC: <5 U/L — SIGNIFICANT CHANGE UP (ref 0–41)
ALT FLD-CCNC: <5 U/L — SIGNIFICANT CHANGE UP (ref 0–41)
ANION GAP SERPL CALC-SCNC: 7 MMOL/L — SIGNIFICANT CHANGE UP (ref 7–14)
APPEARANCE UR: CLEAR — SIGNIFICANT CHANGE UP
APPEARANCE UR: CLEAR — SIGNIFICANT CHANGE UP
AST SERPL-CCNC: 10 U/L — SIGNIFICANT CHANGE UP (ref 0–41)
AST SERPL-CCNC: 10 U/L — SIGNIFICANT CHANGE UP (ref 0–41)
AST SERPL-CCNC: 38 U/L — SIGNIFICANT CHANGE UP (ref 0–41)
AST SERPL-CCNC: 38 U/L — SIGNIFICANT CHANGE UP (ref 0–41)
BASOPHILS # BLD AUTO: 0.07 K/UL — SIGNIFICANT CHANGE UP (ref 0–0.2)
BASOPHILS # BLD AUTO: 0.07 K/UL — SIGNIFICANT CHANGE UP (ref 0–0.2)
BASOPHILS NFR BLD AUTO: 0.8 % — SIGNIFICANT CHANGE UP (ref 0–1)
BASOPHILS NFR BLD AUTO: 0.8 % — SIGNIFICANT CHANGE UP (ref 0–1)
BILIRUB SERPL-MCNC: 0.3 MG/DL — SIGNIFICANT CHANGE UP (ref 0.2–1.2)
BILIRUB SERPL-MCNC: 0.3 MG/DL — SIGNIFICANT CHANGE UP (ref 0.2–1.2)
BILIRUB SERPL-MCNC: <0.2 MG/DL — SIGNIFICANT CHANGE UP (ref 0.2–1.2)
BILIRUB SERPL-MCNC: <0.2 MG/DL — SIGNIFICANT CHANGE UP (ref 0.2–1.2)
BILIRUB UR-MCNC: NEGATIVE — SIGNIFICANT CHANGE UP
BILIRUB UR-MCNC: NEGATIVE — SIGNIFICANT CHANGE UP
BLD GP AB SCN SERPL QL: SIGNIFICANT CHANGE UP
BLD GP AB SCN SERPL QL: SIGNIFICANT CHANGE UP
BUN SERPL-MCNC: 17 MG/DL — SIGNIFICANT CHANGE UP (ref 10–20)
BUN SERPL-MCNC: 17 MG/DL — SIGNIFICANT CHANGE UP (ref 10–20)
BUN SERPL-MCNC: 22 MG/DL — HIGH (ref 10–20)
BUN SERPL-MCNC: 22 MG/DL — HIGH (ref 10–20)
CALCIUM SERPL-MCNC: 8.7 MG/DL — SIGNIFICANT CHANGE UP (ref 8.4–10.5)
CALCIUM SERPL-MCNC: 8.7 MG/DL — SIGNIFICANT CHANGE UP (ref 8.4–10.5)
CALCIUM SERPL-MCNC: 8.9 MG/DL — SIGNIFICANT CHANGE UP (ref 8.4–10.5)
CALCIUM SERPL-MCNC: 8.9 MG/DL — SIGNIFICANT CHANGE UP (ref 8.4–10.5)
CHLORIDE SERPL-SCNC: 103 MMOL/L — SIGNIFICANT CHANGE UP (ref 98–110)
CHLORIDE SERPL-SCNC: 103 MMOL/L — SIGNIFICANT CHANGE UP (ref 98–110)
CHLORIDE SERPL-SCNC: 107 MMOL/L — SIGNIFICANT CHANGE UP (ref 98–110)
CHLORIDE SERPL-SCNC: 107 MMOL/L — SIGNIFICANT CHANGE UP (ref 98–110)
CO2 SERPL-SCNC: 26 MMOL/L — SIGNIFICANT CHANGE UP (ref 17–32)
CO2 SERPL-SCNC: 26 MMOL/L — SIGNIFICANT CHANGE UP (ref 17–32)
CO2 SERPL-SCNC: 28 MMOL/L — SIGNIFICANT CHANGE UP (ref 17–32)
CO2 SERPL-SCNC: 28 MMOL/L — SIGNIFICANT CHANGE UP (ref 17–32)
COLOR SPEC: YELLOW — SIGNIFICANT CHANGE UP
COLOR SPEC: YELLOW — SIGNIFICANT CHANGE UP
CREAT SERPL-MCNC: 0.7 MG/DL — SIGNIFICANT CHANGE UP (ref 0.7–1.5)
CREAT SERPL-MCNC: 0.7 MG/DL — SIGNIFICANT CHANGE UP (ref 0.7–1.5)
CREAT SERPL-MCNC: 0.8 MG/DL — SIGNIFICANT CHANGE UP (ref 0.7–1.5)
CREAT SERPL-MCNC: 0.8 MG/DL — SIGNIFICANT CHANGE UP (ref 0.7–1.5)
DIFF PNL FLD: NEGATIVE — SIGNIFICANT CHANGE UP
DIFF PNL FLD: NEGATIVE — SIGNIFICANT CHANGE UP
EGFR: 72 ML/MIN/1.73M2 — SIGNIFICANT CHANGE UP
EGFR: 72 ML/MIN/1.73M2 — SIGNIFICANT CHANGE UP
EGFR: 84 ML/MIN/1.73M2 — SIGNIFICANT CHANGE UP
EGFR: 84 ML/MIN/1.73M2 — SIGNIFICANT CHANGE UP
EOSINOPHIL # BLD AUTO: 0.12 K/UL — SIGNIFICANT CHANGE UP (ref 0–0.7)
EOSINOPHIL # BLD AUTO: 0.12 K/UL — SIGNIFICANT CHANGE UP (ref 0–0.7)
EOSINOPHIL NFR BLD AUTO: 1.3 % — SIGNIFICANT CHANGE UP (ref 0–8)
EOSINOPHIL NFR BLD AUTO: 1.3 % — SIGNIFICANT CHANGE UP (ref 0–8)
GLUCOSE BLDC GLUCOMTR-MCNC: 132 MG/DL — HIGH (ref 70–99)
GLUCOSE BLDC GLUCOMTR-MCNC: 132 MG/DL — HIGH (ref 70–99)
GLUCOSE BLDC GLUCOMTR-MCNC: 154 MG/DL — HIGH (ref 70–99)
GLUCOSE BLDC GLUCOMTR-MCNC: 185 MG/DL — HIGH (ref 70–99)
GLUCOSE BLDC GLUCOMTR-MCNC: 185 MG/DL — HIGH (ref 70–99)
GLUCOSE SERPL-MCNC: 143 MG/DL — HIGH (ref 70–99)
GLUCOSE SERPL-MCNC: 143 MG/DL — HIGH (ref 70–99)
GLUCOSE SERPL-MCNC: 156 MG/DL — HIGH (ref 70–99)
GLUCOSE SERPL-MCNC: 156 MG/DL — HIGH (ref 70–99)
GLUCOSE UR QL: NEGATIVE MG/DL — SIGNIFICANT CHANGE UP
GLUCOSE UR QL: NEGATIVE MG/DL — SIGNIFICANT CHANGE UP
HCT VFR BLD CALC: 30.9 % — LOW (ref 37–47)
HCT VFR BLD CALC: 30.9 % — LOW (ref 37–47)
HCT VFR BLD CALC: 35.4 % — LOW (ref 37–47)
HCT VFR BLD CALC: 35.4 % — LOW (ref 37–47)
HGB BLD-MCNC: 10.9 G/DL — LOW (ref 12–16)
HGB BLD-MCNC: 10.9 G/DL — LOW (ref 12–16)
HGB BLD-MCNC: 9.9 G/DL — LOW (ref 12–16)
HGB BLD-MCNC: 9.9 G/DL — LOW (ref 12–16)
IMM GRANULOCYTES NFR BLD AUTO: 0.2 % — SIGNIFICANT CHANGE UP (ref 0.1–0.3)
IMM GRANULOCYTES NFR BLD AUTO: 0.2 % — SIGNIFICANT CHANGE UP (ref 0.1–0.3)
KETONES UR-MCNC: NEGATIVE MG/DL — SIGNIFICANT CHANGE UP
KETONES UR-MCNC: NEGATIVE MG/DL — SIGNIFICANT CHANGE UP
LEUKOCYTE ESTERASE UR-ACNC: NEGATIVE — SIGNIFICANT CHANGE UP
LEUKOCYTE ESTERASE UR-ACNC: NEGATIVE — SIGNIFICANT CHANGE UP
LIDOCAIN IGE QN: 58 U/L — SIGNIFICANT CHANGE UP (ref 7–60)
LIDOCAIN IGE QN: 58 U/L — SIGNIFICANT CHANGE UP (ref 7–60)
LYMPHOCYTES # BLD AUTO: 2.39 K/UL — SIGNIFICANT CHANGE UP (ref 1.2–3.4)
LYMPHOCYTES # BLD AUTO: 2.39 K/UL — SIGNIFICANT CHANGE UP (ref 1.2–3.4)
LYMPHOCYTES # BLD AUTO: 26.6 % — SIGNIFICANT CHANGE UP (ref 20.5–51.1)
LYMPHOCYTES # BLD AUTO: 26.6 % — SIGNIFICANT CHANGE UP (ref 20.5–51.1)
MCHC RBC-ENTMCNC: 27.5 PG — SIGNIFICANT CHANGE UP (ref 27–31)
MCHC RBC-ENTMCNC: 27.5 PG — SIGNIFICANT CHANGE UP (ref 27–31)
MCHC RBC-ENTMCNC: 28.2 PG — SIGNIFICANT CHANGE UP (ref 27–31)
MCHC RBC-ENTMCNC: 28.2 PG — SIGNIFICANT CHANGE UP (ref 27–31)
MCHC RBC-ENTMCNC: 30.8 G/DL — LOW (ref 32–37)
MCHC RBC-ENTMCNC: 30.8 G/DL — LOW (ref 32–37)
MCHC RBC-ENTMCNC: 32 G/DL — SIGNIFICANT CHANGE UP (ref 32–37)
MCHC RBC-ENTMCNC: 32 G/DL — SIGNIFICANT CHANGE UP (ref 32–37)
MCV RBC AUTO: 88 FL — SIGNIFICANT CHANGE UP (ref 81–99)
MCV RBC AUTO: 88 FL — SIGNIFICANT CHANGE UP (ref 81–99)
MCV RBC AUTO: 89.2 FL — SIGNIFICANT CHANGE UP (ref 81–99)
MCV RBC AUTO: 89.2 FL — SIGNIFICANT CHANGE UP (ref 81–99)
MONOCYTES # BLD AUTO: 0.88 K/UL — HIGH (ref 0.1–0.6)
MONOCYTES # BLD AUTO: 0.88 K/UL — HIGH (ref 0.1–0.6)
MONOCYTES NFR BLD AUTO: 9.8 % — HIGH (ref 1.7–9.3)
MONOCYTES NFR BLD AUTO: 9.8 % — HIGH (ref 1.7–9.3)
NEUTROPHILS # BLD AUTO: 5.5 K/UL — SIGNIFICANT CHANGE UP (ref 1.4–6.5)
NEUTROPHILS # BLD AUTO: 5.5 K/UL — SIGNIFICANT CHANGE UP (ref 1.4–6.5)
NEUTROPHILS NFR BLD AUTO: 61.3 % — SIGNIFICANT CHANGE UP (ref 42.2–75.2)
NEUTROPHILS NFR BLD AUTO: 61.3 % — SIGNIFICANT CHANGE UP (ref 42.2–75.2)
NITRITE UR-MCNC: NEGATIVE — SIGNIFICANT CHANGE UP
NITRITE UR-MCNC: NEGATIVE — SIGNIFICANT CHANGE UP
NRBC # BLD: 0 /100 WBCS — SIGNIFICANT CHANGE UP (ref 0–0)
PH UR: 8 — SIGNIFICANT CHANGE UP (ref 5–8)
PH UR: 8 — SIGNIFICANT CHANGE UP (ref 5–8)
PLATELET # BLD AUTO: 415 K/UL — HIGH (ref 130–400)
PLATELET # BLD AUTO: 415 K/UL — HIGH (ref 130–400)
PLATELET # BLD AUTO: 444 K/UL — HIGH (ref 130–400)
PLATELET # BLD AUTO: 444 K/UL — HIGH (ref 130–400)
PMV BLD: 10.1 FL — SIGNIFICANT CHANGE UP (ref 7.4–10.4)
PMV BLD: 10.1 FL — SIGNIFICANT CHANGE UP (ref 7.4–10.4)
PMV BLD: 10.3 FL — SIGNIFICANT CHANGE UP (ref 7.4–10.4)
PMV BLD: 10.3 FL — SIGNIFICANT CHANGE UP (ref 7.4–10.4)
POTASSIUM SERPL-MCNC: 4.3 MMOL/L — SIGNIFICANT CHANGE UP (ref 3.5–5)
POTASSIUM SERPL-MCNC: 4.3 MMOL/L — SIGNIFICANT CHANGE UP (ref 3.5–5)
POTASSIUM SERPL-MCNC: 6 MMOL/L — CRITICAL HIGH (ref 3.5–5)
POTASSIUM SERPL-MCNC: 6 MMOL/L — CRITICAL HIGH (ref 3.5–5)
POTASSIUM SERPL-SCNC: 4.3 MMOL/L — SIGNIFICANT CHANGE UP (ref 3.5–5)
POTASSIUM SERPL-SCNC: 4.3 MMOL/L — SIGNIFICANT CHANGE UP (ref 3.5–5)
POTASSIUM SERPL-SCNC: 6 MMOL/L — CRITICAL HIGH (ref 3.5–5)
POTASSIUM SERPL-SCNC: 6 MMOL/L — CRITICAL HIGH (ref 3.5–5)
PROT SERPL-MCNC: 6.1 G/DL — SIGNIFICANT CHANGE UP (ref 6–8)
PROT SERPL-MCNC: 6.1 G/DL — SIGNIFICANT CHANGE UP (ref 6–8)
PROT SERPL-MCNC: 6.2 G/DL — SIGNIFICANT CHANGE UP (ref 6–8)
PROT SERPL-MCNC: 6.2 G/DL — SIGNIFICANT CHANGE UP (ref 6–8)
PROT UR-MCNC: SIGNIFICANT CHANGE UP MG/DL
PROT UR-MCNC: SIGNIFICANT CHANGE UP MG/DL
RBC # BLD: 3.51 M/UL — LOW (ref 4.2–5.4)
RBC # BLD: 3.51 M/UL — LOW (ref 4.2–5.4)
RBC # BLD: 3.97 M/UL — LOW (ref 4.2–5.4)
RBC # BLD: 3.97 M/UL — LOW (ref 4.2–5.4)
RBC # FLD: 15.7 % — HIGH (ref 11.5–14.5)
RBC # FLD: 15.7 % — HIGH (ref 11.5–14.5)
RBC # FLD: 15.9 % — HIGH (ref 11.5–14.5)
RBC # FLD: 15.9 % — HIGH (ref 11.5–14.5)
SODIUM SERPL-SCNC: 138 MMOL/L — SIGNIFICANT CHANGE UP (ref 135–146)
SODIUM SERPL-SCNC: 138 MMOL/L — SIGNIFICANT CHANGE UP (ref 135–146)
SODIUM SERPL-SCNC: 140 MMOL/L — SIGNIFICANT CHANGE UP (ref 135–146)
SODIUM SERPL-SCNC: 140 MMOL/L — SIGNIFICANT CHANGE UP (ref 135–146)
SP GR SPEC: >1.03 — HIGH (ref 1–1.03)
SP GR SPEC: >1.03 — HIGH (ref 1–1.03)
TROPONIN T SERPL-MCNC: <0.01 NG/ML — SIGNIFICANT CHANGE UP
TROPONIN T SERPL-MCNC: <0.01 NG/ML — SIGNIFICANT CHANGE UP
UROBILINOGEN FLD QL: 1 MG/DL — SIGNIFICANT CHANGE UP (ref 0.2–1)
UROBILINOGEN FLD QL: 1 MG/DL — SIGNIFICANT CHANGE UP (ref 0.2–1)
WBC # BLD: 7.92 K/UL — SIGNIFICANT CHANGE UP (ref 4.8–10.8)
WBC # BLD: 7.92 K/UL — SIGNIFICANT CHANGE UP (ref 4.8–10.8)
WBC # BLD: 8.98 K/UL — SIGNIFICANT CHANGE UP (ref 4.8–10.8)
WBC # BLD: 8.98 K/UL — SIGNIFICANT CHANGE UP (ref 4.8–10.8)
WBC # FLD AUTO: 7.92 K/UL — SIGNIFICANT CHANGE UP (ref 4.8–10.8)
WBC # FLD AUTO: 7.92 K/UL — SIGNIFICANT CHANGE UP (ref 4.8–10.8)
WBC # FLD AUTO: 8.98 K/UL — SIGNIFICANT CHANGE UP (ref 4.8–10.8)
WBC # FLD AUTO: 8.98 K/UL — SIGNIFICANT CHANGE UP (ref 4.8–10.8)

## 2023-11-11 PROCEDURE — 99222 1ST HOSP IP/OBS MODERATE 55: CPT

## 2023-11-11 PROCEDURE — 85027 COMPLETE CBC AUTOMATED: CPT

## 2023-11-11 PROCEDURE — 81003 URINALYSIS AUTO W/O SCOPE: CPT

## 2023-11-11 PROCEDURE — 82962 GLUCOSE BLOOD TEST: CPT

## 2023-11-11 PROCEDURE — 74174 CTA ABD&PLVS W/CONTRAST: CPT | Mod: 26,QQ

## 2023-11-11 PROCEDURE — 36415 COLL VENOUS BLD VENIPUNCTURE: CPT

## 2023-11-11 PROCEDURE — 80053 COMPREHEN METABOLIC PANEL: CPT

## 2023-11-11 PROCEDURE — C9113: CPT

## 2023-11-11 PROCEDURE — 70450 CT HEAD/BRAIN W/O DYE: CPT | Mod: 26,MA

## 2023-11-11 PROCEDURE — 71045 X-RAY EXAM CHEST 1 VIEW: CPT | Mod: 26

## 2023-11-11 RX ORDER — ONDANSETRON 8 MG/1
4 TABLET, FILM COATED ORAL EVERY 8 HOURS
Refills: 0 | Status: DISCONTINUED | OUTPATIENT
Start: 2023-11-11 | End: 2023-11-13

## 2023-11-11 RX ORDER — SODIUM CHLORIDE 9 MG/ML
500 INJECTION INTRAMUSCULAR; INTRAVENOUS; SUBCUTANEOUS ONCE
Refills: 0 | Status: COMPLETED | OUTPATIENT
Start: 2023-11-11 | End: 2023-11-11

## 2023-11-11 RX ORDER — PANTOPRAZOLE SODIUM 20 MG/1
1 TABLET, DELAYED RELEASE ORAL
Refills: 0 | DISCHARGE

## 2023-11-11 RX ORDER — DIVALPROEX SODIUM 500 MG/1
1 TABLET, DELAYED RELEASE ORAL
Refills: 0 | DISCHARGE

## 2023-11-11 RX ORDER — SENNA PLUS 8.6 MG/1
2 TABLET ORAL AT BEDTIME
Refills: 0 | Status: DISCONTINUED | OUTPATIENT
Start: 2023-11-11 | End: 2023-11-13

## 2023-11-11 RX ORDER — GABAPENTIN 400 MG/1
2 CAPSULE ORAL
Refills: 0 | DISCHARGE

## 2023-11-11 RX ORDER — METFORMIN HYDROCHLORIDE 850 MG/1
1 TABLET ORAL
Refills: 0 | DISCHARGE

## 2023-11-11 RX ORDER — POLYETHYLENE GLYCOL 3350 17 G/17G
17 POWDER, FOR SOLUTION ORAL DAILY
Refills: 0 | Status: DISCONTINUED | OUTPATIENT
Start: 2023-11-11 | End: 2023-11-13

## 2023-11-11 RX ORDER — PANTOPRAZOLE SODIUM 20 MG/1
40 TABLET, DELAYED RELEASE ORAL
Refills: 0 | Status: DISCONTINUED | OUTPATIENT
Start: 2023-11-11 | End: 2023-11-13

## 2023-11-11 RX ORDER — PANTOPRAZOLE SODIUM 20 MG/1
40 TABLET, DELAYED RELEASE ORAL ONCE
Refills: 0 | Status: COMPLETED | OUTPATIENT
Start: 2023-11-11 | End: 2023-11-11

## 2023-11-11 RX ORDER — PANTOPRAZOLE SODIUM 20 MG/1
40 TABLET, DELAYED RELEASE ORAL
Refills: 0 | Status: DISCONTINUED | OUTPATIENT
Start: 2023-11-11 | End: 2023-11-11

## 2023-11-11 RX ORDER — SODIUM CHLORIDE 9 MG/ML
1000 INJECTION, SOLUTION INTRAVENOUS
Refills: 0 | Status: DISCONTINUED | OUTPATIENT
Start: 2023-11-11 | End: 2023-11-13

## 2023-11-11 RX ORDER — ACETAMINOPHEN 500 MG
1000 TABLET ORAL ONCE
Refills: 0 | Status: COMPLETED | OUTPATIENT
Start: 2023-11-11 | End: 2023-11-11

## 2023-11-11 RX ORDER — INSULIN LISPRO 100/ML
VIAL (ML) SUBCUTANEOUS
Refills: 0 | Status: DISCONTINUED | OUTPATIENT
Start: 2023-11-11 | End: 2023-11-13

## 2023-11-11 RX ORDER — ACETAMINOPHEN 500 MG
650 TABLET ORAL EVERY 6 HOURS
Refills: 0 | Status: DISCONTINUED | OUTPATIENT
Start: 2023-11-11 | End: 2023-11-13

## 2023-11-11 RX ORDER — LANOLIN ALCOHOL/MO/W.PET/CERES
3 CREAM (GRAM) TOPICAL AT BEDTIME
Refills: 0 | Status: DISCONTINUED | OUTPATIENT
Start: 2023-11-11 | End: 2023-11-13

## 2023-11-11 RX ORDER — DEXTROSE 50 % IN WATER 50 %
12.5 SYRINGE (ML) INTRAVENOUS ONCE
Refills: 0 | Status: DISCONTINUED | OUTPATIENT
Start: 2023-11-11 | End: 2023-11-13

## 2023-11-11 RX ORDER — DEXTROSE 50 % IN WATER 50 %
15 SYRINGE (ML) INTRAVENOUS ONCE
Refills: 0 | Status: DISCONTINUED | OUTPATIENT
Start: 2023-11-11 | End: 2023-11-13

## 2023-11-11 RX ORDER — GLUCAGON INJECTION, SOLUTION 0.5 MG/.1ML
1 INJECTION, SOLUTION SUBCUTANEOUS ONCE
Refills: 0 | Status: DISCONTINUED | OUTPATIENT
Start: 2023-11-11 | End: 2023-11-13

## 2023-11-11 RX ORDER — FAMOTIDINE 10 MG/ML
1 INJECTION INTRAVENOUS
Refills: 0 | DISCHARGE

## 2023-11-11 RX ORDER — DIVALPROEX SODIUM 500 MG/1
125 TABLET, DELAYED RELEASE ORAL THREE TIMES A DAY
Refills: 0 | Status: DISCONTINUED | OUTPATIENT
Start: 2023-11-11 | End: 2023-11-13

## 2023-11-11 RX ORDER — SENNOSIDES/DOCUSATE SODIUM 8.6MG-50MG
2 TABLET ORAL
Refills: 0 | DISCHARGE

## 2023-11-11 RX ORDER — APIXABAN 2.5 MG/1
2.5 TABLET, FILM COATED ORAL
Refills: 0 | Status: DISCONTINUED | OUTPATIENT
Start: 2023-11-11 | End: 2023-11-13

## 2023-11-11 RX ORDER — POLYETHYLENE GLYCOL 3350 17 G/17G
17 POWDER, FOR SOLUTION ORAL
Refills: 0 | DISCHARGE

## 2023-11-11 RX ORDER — LOSARTAN POTASSIUM 100 MG/1
25 TABLET, FILM COATED ORAL DAILY
Refills: 0 | Status: DISCONTINUED | OUTPATIENT
Start: 2023-11-11 | End: 2023-11-13

## 2023-11-11 RX ORDER — METOPROLOL TARTRATE 50 MG
12.5 TABLET ORAL
Refills: 0 | Status: DISCONTINUED | OUTPATIENT
Start: 2023-11-11 | End: 2023-11-13

## 2023-11-11 RX ORDER — DEXTROSE 50 % IN WATER 50 %
25 SYRINGE (ML) INTRAVENOUS ONCE
Refills: 0 | Status: DISCONTINUED | OUTPATIENT
Start: 2023-11-11 | End: 2023-11-13

## 2023-11-11 RX ORDER — INSULIN LISPRO 100/ML
VIAL (ML) SUBCUTANEOUS AT BEDTIME
Refills: 0 | Status: DISCONTINUED | OUTPATIENT
Start: 2023-11-11 | End: 2023-11-13

## 2023-11-11 RX ORDER — CHOLECALCIFEROL (VITAMIN D3) 125 MCG
1 CAPSULE ORAL
Refills: 0 | DISCHARGE

## 2023-11-11 RX ORDER — ACETAMINOPHEN 500 MG
2 TABLET ORAL
Refills: 0 | DISCHARGE

## 2023-11-11 RX ORDER — GABAPENTIN 400 MG/1
200 CAPSULE ORAL THREE TIMES A DAY
Refills: 0 | Status: DISCONTINUED | OUTPATIENT
Start: 2023-11-11 | End: 2023-11-13

## 2023-11-11 RX ADMIN — DIVALPROEX SODIUM 125 MILLIGRAM(S): 500 TABLET, DELAYED RELEASE ORAL at 14:56

## 2023-11-11 RX ADMIN — GABAPENTIN 200 MILLIGRAM(S): 400 CAPSULE ORAL at 22:25

## 2023-11-11 RX ADMIN — GABAPENTIN 200 MILLIGRAM(S): 400 CAPSULE ORAL at 13:36

## 2023-11-11 RX ADMIN — Medication 0.5 MILLIGRAM(S): at 18:18

## 2023-11-11 RX ADMIN — PANTOPRAZOLE SODIUM 40 MILLIGRAM(S): 20 TABLET, DELAYED RELEASE ORAL at 01:06

## 2023-11-11 RX ADMIN — PANTOPRAZOLE SODIUM 40 MILLIGRAM(S): 20 TABLET, DELAYED RELEASE ORAL at 18:14

## 2023-11-11 RX ADMIN — DIVALPROEX SODIUM 125 MILLIGRAM(S): 500 TABLET, DELAYED RELEASE ORAL at 22:23

## 2023-11-11 RX ADMIN — APIXABAN 2.5 MILLIGRAM(S): 2.5 TABLET, FILM COATED ORAL at 18:13

## 2023-11-11 RX ADMIN — SENNA PLUS 2 TABLET(S): 8.6 TABLET ORAL at 22:25

## 2023-11-11 RX ADMIN — Medication 12.5 MILLIGRAM(S): at 18:13

## 2023-11-11 RX ADMIN — SODIUM CHLORIDE 500 MILLILITER(S): 9 INJECTION INTRAMUSCULAR; INTRAVENOUS; SUBCUTANEOUS at 01:06

## 2023-11-11 RX ADMIN — Medication 2: at 16:11

## 2023-11-11 RX ADMIN — Medication 5 MILLIGRAM(S): at 18:13

## 2023-11-11 RX ADMIN — Medication 650 MILLIGRAM(S): at 18:13

## 2023-11-11 RX ADMIN — PANTOPRAZOLE SODIUM 40 MILLIGRAM(S): 20 TABLET, DELAYED RELEASE ORAL at 08:38

## 2023-11-11 RX ADMIN — Medication 400 MILLIGRAM(S): at 09:00

## 2023-11-11 NOTE — ED PROVIDER NOTE - OBJECTIVE STATEMENT
88 yold female to Ed Pmhx Afib on xarelto,  cva with left hemiplegia, bipolar disorder, Upper gi bleed s/o endoclips; pt presents to Ed c/o headache gradual onset started yesterday and also abdominal pain with n/v; pt states she vomited blood x1 yesterday; denies sob, fever, chills, chest, back pain; pt denies melena or brbpr; pt unsure if she still takes xarelto(no other family avail)

## 2023-11-11 NOTE — H&P ADULT - NSHPLABSRESULTS_GEN_ALL_CORE
Complete Blood Count (11.11.23 @ 11:34)    Nucleated RBC: 0 /100 WBCs    WBC Count: 7.92 K/uL    RBC Count: 3.97 M/uL    Hemoglobin: 10.9 g/dL    Hematocrit: 35.4 %    Mean Cell Volume: 89.2 fL    Mean Cell Hemoglobin: 27.5 pg    Mean Cell Hemoglobin Conc: 30.8 g/dL    Red Cell Distrib Width: 15.7 %    Platelet Count - Automated: 415 K/uL    MPV: 10.1 fL    Comprehensive Metabolic Panel (11.11.23 @ 00:30)    Sodium: 140 mmol/L    Potassium: 6.0: Hemolyzed. Interpret with caution  TYPE:(C=Critical, N=Notification, A=Abnormal) C  TESTS: K  DATE/TIME CALLED: 11/11/2023 01:36:35 EST  CALLED TO: MARIO CATHERINE  READ BACK (2 Patient Identifiers)(Y/N): Y  READ BACK VALUES (Y/N): Y  CALLED BY: IG mmol/L    Chloride: 107 mmol/L    Carbon Dioxide: 26 mmol/L    Anion Gap: 7 mmol/L    Blood Urea Nitrogen: 22 mg/dL    Creatinine: 0.8 mg/dL    Glucose: 156 mg/dL    Calcium: 8.7 mg/dL    Protein Total: 6.1 g/dL    Albumin: 3.0 g/dL    Bilirubin Total: <0.2 mg/dL    Alkaline Phosphatase: 42: Hemolyzed. Interpret with caution U/L    Aspartate Aminotransferase (AST/SGOT): 38: Hemolyzed. Interpret with caution U/L    Alanine Aminotransferase (ALT/SGPT): 7: Hemolyzed. Interpret with caution U/L    eGFR: 72: The estimated glomerular filtration rate (eGFR) is calculated using the  2021 CKD-EPI creatinine equation, which does not have a coefficient for  race and is validated in individuals 18 years of age and older (N Engl J  Med 2021; 385:6147-1535). Creatinine-based eGFR may be inaccurate in  various situations including but not limited to extremes of muscle mass,  altered dietary protein intake, or medications that affect renal tubular  creatinine secretion. mL/min/1.73m2

## 2023-11-11 NOTE — PATIENT PROFILE ADULT - NSPROPTRIGHTSUPPORTPERSON_GEN_A_NUR
Travel Clinic locations:  · [facility] ([place]) [phone]  · [facility] ([place]) [phone]  · [facility] ([place]) [phone]  · [facility] ([place]) [phone]    Dr. Hernández [phone]      PATIENT INFORMATION   -- Fasting labwork has been ordered for you. General patient information when having a lab test:  · Fasting - No caloric intake (WATER IS OKAY) for a minimum of 8-10 hours before your blood draw:  · Tests that commonly require fasting are:  · Cholesterol (lipid panel)  · Basic chemistry panel  · Comprehensive chemistry panel  · Glucose (blood sugar)   · Medicine - You can take any prescribed or routine medicine during your fast.  · Brushing Teeth - You can brush your teeth even if you are fasting.  Getting Your Results - Your doctor’s office will notify you of your results within 10 working days.  -- [facility] Lab Hours at [place]      Phone #:    [phone]  Mon 7am - 7pm   Tues - Fri 7am - 5pm   Saturday  7am - 12 noon     -- Please complete labs prior to your next visit.      Additional Educational Resources:  For additional resources regarding your symptoms, diagnosis, or further health information, please visit the Health Resources section on [email or web address] or the Online Health Resources section in [employer].                                                                                             same name as above

## 2023-11-11 NOTE — ED ADULT NURSE NOTE - NSFALLHARMRISKINTERV_ED_ALL_ED
Assistance OOB with selected safe patient handling equipment if applicable/Assistance with ambulation/Communicate risk of Fall with Harm to all staff, patient, and family/Monitor gait and stability/Monitor for mental status changes and reorient to person, place, and time, as needed/Move patient closer to nursing station/within visual sight of ED staff/Provide patient with walking aids/Provide visual cue: red socks, yellow wristband, yellow gown, etc/Reinforce activity limits and safety measures with patient and family/Toileting schedule using arm’s reach rule for commode and bathroom/Use of alarms - bed, stretcher, chair and/or video monitoring/Bed in lowest position, wheels locked, appropriate side rails in place/Call bell, personal items and telephone in reach/Instruct patient to call for assistance before getting out of bed/chair/stretcher/Non-slip footwear applied when patient is off stretcher/[place] to call system/Physically safe environment - no spills, clutter or unnecessary equipment/Purposeful Proactive Rounding/Room/bathroom lighting operational, light cord in reach

## 2023-11-11 NOTE — H&P ADULT - ATTENDING COMMENTS
88 year old female past medical history of Afib on Eliquis,  cva with left hemiplegia, bipolar disorder, Upper gi bleed s/p endoclips with recent ICU admission presenting to ED complaining of headache for 5 hours with gradual onset.  Patient stated to me that she vomited a little bit of blood yesterday.  Has not been having any more vomiting after.  Today she states she is having some abdominal pain.    Assessment    Mild abdominal pain / nausea / vomiting possible gastroenteritis, questionable hematemesis but now resolved  Paroxysmal afib on xarelto  Hx of upper GI bleed needing endoclips  CVA with left hemiplegia  Bipolar disorder    Plan    - not having anymore vomiting, hematemesis was endorsed by the patient but denied by nursing staff, hgb stable, can advance diet as tolerated, c/w PPI bid, last EGD was three weeks ago, consult GI if drop in hgb, f/u occult stool  - c/w home losartan / metoprolol  - c/w home eliquis for afib  - c/w psych meds    Pending: follow hgb, f/u occult stool, advance diet as tolerated    # DVT PPX eliquis

## 2023-11-11 NOTE — PATIENT PROFILE ADULT - NSPROPTRIGHTCAREGIVER_GEN_A_NUR
[address] [facility] [address] Gastroenterology                                                                                                  Clinic History and Physical     Pa Tobacco Use      Smoking status: Never Smoker      Smokeless tobacco: Never Used    Alcohol use: No    Drug use: Yes      Comment: occ.        Medications (Active prior to today's visit):    Current Outpatient Medications:  PEG 3350-KCl-NaBcb-NaCl-NaSulf (C affect, behavior is normal    Nursing note and vitals reviewed      Labs/Imaging:     Patient's labs and imaging were reviewed and discussed with patient today. See HPI and A&P for further details.       ASSESSMENT/PLAN:   Madison Marine is a 50 year o Visit:  No orders of the defined types were placed in this encounter.       Meds This Visit:  Requested Prescriptions     Signed Prescriptions Disp Refills   • PEG 3350-KCl-NaBcb-NaCl-NaSulf (COLYTE WITH FLAVOR PACKS) 240 g Oral Recon Soln 1 Bottle 0     Si information could not be obtained

## 2023-11-11 NOTE — H&P ADULT - ASSESSMENT
88 year old female past medical history of Afib on Eliquis,  cva with left hemiplegia, bipolar disorder, Upper gi bleed s/p endoclips with recent ICU admission presenting to ED complaining of headache for 5 hours with gradual onset.    #Headache  - CT head negative   - s/p IV Tylenol     Plan:   - c/w home meds  - c/w Tylenol   - monitor for changes    #Chest Pain  - Trops negative  - EKG non-ischemic    Plan:  - Repeat Trops  - Monitor    #Reported Hematemesis  - Unlikely this actually happened  - Hemoglobin stable    Plan:   - Restart Eliquis 2.5 BID  - Diet okay  - Daily cbc  - Monitor for changes      #Misc  - DVT Prophylaxis: Eliquis  - GI Prophylaxis: Protonix  - Diet: Pureed   - Activity: As tolerated   - IV Fluids: None  - Code Status: Full    Dispo: Likely DC in 24-48

## 2023-11-11 NOTE — ED PROVIDER NOTE - CARE PLAN
1 Principal Discharge DX:	GI bleed  Secondary Diagnosis:	Anemia   Principal Discharge DX:	Abdominal pain  Secondary Diagnosis:	Anemia  Secondary Diagnosis:	Headache, unspecified  Secondary Diagnosis:	Chest pain  Secondary Diagnosis:	Hematemesis

## 2023-11-11 NOTE — ED PROVIDER NOTE - DIFFERENTIAL DIAGNOSIS
Pancreatitis, hepatic failure, obstructive uropathy, diverticulitis, colitis, abscess, bowel obstruction, bowel perforation. Electrolyte abnormalities, PIERCE, and GI bleeding.  r/o cardiac arrhythmia, r/o ICH. Differential Diagnosis

## 2023-11-11 NOTE — ED PROVIDER NOTE - ATTENDING APP SHARED VISIT CONTRIBUTION OF CARE
Patient presents to ED for multiple symptoms. Patient with h/o recent ICU admission for GI bleeding.   Vital reviewed.   Lungs: CTA, no wheezing, no crackles.  Abd: +BS, +generalized tenderness, ND, soft   A/P: Abdominal pain,   Headache,   chest pain,   h/o vomiting blood,   labs, EKG, CT,   CXR, admission for monitoring for any GI bleeding, GI evaluation and to trend Hb.

## 2023-11-11 NOTE — ED PROVIDER NOTE - PHYSICAL EXAMINATION
Constitutional: Well developed, well nourished. NAD  Head: Normocephalic, atraumatic.  Eyes: PERRL, EOMI.  ENT: No nasal discharge. Mucous membranes dry.  Neck: Supple. Painless ROM.  Cardiovascular: Normal S1, S2. Regular rate and rhythm.    Pulmonary: Normal respiratory rate and effort. Lungs clear to auscultation bilaterally. No wheezing, rales, or rhonchi.  Abdominal: Soft. mild tenderness epigastric and diffuse entire abdomen;   Rectal: rn presents; brown stool no melena  Extremities. Pelvis stable. No lower extremity edema, symmetric calves.  Skin: No rashes, cyanosis.  Neuro: AAOx3. No focal neurological deficits.  Psych: Normal mood. Normal affect.

## 2023-11-11 NOTE — PATIENT PROFILE ADULT - FALL HARM RISK - TYPE OF ASSESSMENT
Patient Education        Dermatitis in Children: Care Instructions  Your Care Instructions  Dermatitis is the general name used for any rash or inflammation of the skin. Different kinds of dermatitis cause different kinds of rashes. Common causes of a rash include new medicines, plants (such as poison oak or poison ivy), heat, stress, and allergies to soaps, cosmetics, detergents, chemicals, and fabrics. Certain illnesses can also cause a rash. Unless caused by an infection, these rashes cannot be spread from person to person. How long your child's rash will last depends on what caused it. Rashes may last a few days or months. Follow-up care is a key part of your child's treatment and safety. Be sure to make and go to all appointments, and call your doctor if your child is having problems. It's also a good idea to know your child's test results and keep a list of the medicines your child takes. How can you care for your child at home? · Do not let your child scratch. Cut your child's nails short, and file them smooth. Or you may have your child wear gloves if this helps keep your child from scratching. · Wash the area with water only. Pat dry. · Put cold, wet cloths on the rash to reduce itching. · Keep your child cool and out of the sun. Heat makes itching worse. · Leave the rash open to the air as much as possible. · If the rash itches, use hydrocortisone cream. Follow the directions on the label. Calamine lotion may help for plant rashes. · If itching affects your child's sleep, ask the doctor about giving your child an antihistamine that might reduce itching and make your child sleepy, such as diphenhydramine (Benadryl). Be safe with medicines. Read and follow all instructions on the label. · If your doctor prescribed a cream, use it as directed. If your doctor prescribed medicine, have your child take it exactly as directed. When should you call for help?    Call your doctor now or seek immediate medical care if:    · Your child has signs of infection, such as:  ? Increased pain, swelling, warmth, or redness. ? Red streaks leading from the rash. ? Pus draining from the rash. ? A fever. Watch closely for changes in your child's health, and be sure to contact your doctor if:    · Your child does not get better as expected. Where can you learn more? Go to [email or web address] and sign in to your [employer] account. Enter J022 in the [employer] box to learn more about \"Dermatitis in Children: Care Instructions. \"     If you do not have an account, please click on the \"Sign Up Now\" link. Current as of: March 3, 2021               Content Version: 13.1  © 0811-9723 Healthwise, Incorporated. Care instructions adapted under license by [facility] ([facility]). If you have questions about a medical condition or this instruction, always ask your healthcare professional. [place] [address] any warranty or liability for your use of this information. Admission

## 2023-11-11 NOTE — H&P ADULT - HISTORY OF PRESENT ILLNESS
88 yold female to Ed Pmhx Afib on xarelto,  cva with left hemiplegia, bipolar disorder, Upper gi bleed s/p endoclips pt presents to Ed c/o headache gradual onset started yesterday and also abdominal pain 88 year old female past medical history of Afib on Eliquis,  cva with left hemiplegia, bipolar disorder, Upper gi bleed s/p endoclips with recent ICU admission presenting to ED complaining of headache for 5 hours with gradual onset. Per the ED note patient also complained of hematemesis x1, however when I spoke to the patient she denied it to me, but endorsed it to the admitting attending. Per [place] "the patient was completely fine, vitals were fine, she did not vomit blood, the daughter just called 911 without telling anyone". Also per the daughter patient did not vomit blood, but came in for head and chest pain.     In the ED vital signs were stable. Hemoglobin was 9.9 (10.3 Nov 3), potassium 6.0 (hemolyzed). CT head was limited by movement, but there was no obvious intracranial pathology.     Vital Signs Last 24 Hrs  T(C): 35.9 (11 Nov 2023 11:55), Max: 36.4 (10 Nov 2023 23:35)  T(F): 96.7 (11 Nov 2023 11:55), Max: 97.5 (10 Nov 2023 23:35)  HR: 92 (11 Nov 2023 11:55) (92 - 92)  BP: 147/79 (11 Nov 2023 11:55) (120/67 - 147/79)  BP(mean): 105 (11 Nov 2023 11:55) (105 - 105)  RR: 18 (11 Nov 2023 11:55) (17 - 18)  SpO2: 96% (11 Nov 2023 11:55) (96% - 99%)    Parameters below as of 10 Nov 2023 23:35  Patient On (Oxygen Delivery Method): room air

## 2023-11-11 NOTE — H&P ADULT - NSHPPHYSICALEXAM_GEN_ALL_CORE
PHYSICAL EXAM:  GENERAL: NAD, well-groomed, well-developed  HEAD:  Atraumatic, Normocephalic  EYES: EOMI, PERRLA, conjunctiva and sclera clear  NECK: Supple, No JVD, Normal thyroid  HEART: Regular rate and rhythm; No murmurs, rubs, or gallops  RESPIRATORY: CTA B/L, No W/R/R  ABDOMEN: Soft, Nontender, Nondistended; Bowel sounds present  NEUROLOGY: A&Ox3, nonfocal, moving all extremities  EXTREMITIES:  2+ Peripheral Pulses, No clubbing, cyanosis, or edema  SKIN: warm, dry, normal color, no rash or abnormal lesions

## 2023-11-11 NOTE — ED PROVIDER NOTE - NS ED ATTENDING STATEMENT MOD
This was a shared visit with the MOJGAN. I reviewed and verified the documentation and independently performed the documented:

## 2023-11-11 NOTE — PATIENT PROFILE ADULT - FALL HARM RISK - HARM RISK INTERVENTIONS

## 2023-11-12 LAB
GLUCOSE BLDC GLUCOMTR-MCNC: 113 MG/DL — HIGH (ref 70–99)
GLUCOSE BLDC GLUCOMTR-MCNC: 113 MG/DL — HIGH (ref 70–99)
GLUCOSE BLDC GLUCOMTR-MCNC: 174 MG/DL — HIGH (ref 70–99)
GLUCOSE BLDC GLUCOMTR-MCNC: 174 MG/DL — HIGH (ref 70–99)
GLUCOSE BLDC GLUCOMTR-MCNC: 179 MG/DL — HIGH (ref 70–99)
GLUCOSE BLDC GLUCOMTR-MCNC: 179 MG/DL — HIGH (ref 70–99)
GLUCOSE BLDC GLUCOMTR-MCNC: 201 MG/DL — HIGH (ref 70–99)
GLUCOSE BLDC GLUCOMTR-MCNC: 201 MG/DL — HIGH (ref 70–99)

## 2023-11-12 PROCEDURE — 99232 SBSQ HOSP IP/OBS MODERATE 35: CPT

## 2023-11-12 RX ADMIN — GABAPENTIN 200 MILLIGRAM(S): 400 CAPSULE ORAL at 13:46

## 2023-11-12 RX ADMIN — POLYETHYLENE GLYCOL 3350 17 GRAM(S): 17 POWDER, FOR SOLUTION ORAL at 11:40

## 2023-11-12 RX ADMIN — PANTOPRAZOLE SODIUM 40 MILLIGRAM(S): 20 TABLET, DELAYED RELEASE ORAL at 17:32

## 2023-11-12 RX ADMIN — DIVALPROEX SODIUM 125 MILLIGRAM(S): 500 TABLET, DELAYED RELEASE ORAL at 05:50

## 2023-11-12 RX ADMIN — Medication 12.5 MILLIGRAM(S): at 17:31

## 2023-11-12 RX ADMIN — Medication 5 MILLIGRAM(S): at 17:31

## 2023-11-12 RX ADMIN — Medication 0.5 MILLIGRAM(S): at 05:47

## 2023-11-12 RX ADMIN — Medication 5 MILLIGRAM(S): at 05:48

## 2023-11-12 RX ADMIN — Medication 2: at 11:41

## 2023-11-12 RX ADMIN — DIVALPROEX SODIUM 125 MILLIGRAM(S): 500 TABLET, DELAYED RELEASE ORAL at 23:48

## 2023-11-12 RX ADMIN — GABAPENTIN 200 MILLIGRAM(S): 400 CAPSULE ORAL at 23:26

## 2023-11-12 RX ADMIN — PANTOPRAZOLE SODIUM 40 MILLIGRAM(S): 20 TABLET, DELAYED RELEASE ORAL at 05:46

## 2023-11-12 RX ADMIN — SENNA PLUS 2 TABLET(S): 8.6 TABLET ORAL at 23:27

## 2023-11-12 RX ADMIN — Medication 12.5 MILLIGRAM(S): at 05:47

## 2023-11-12 RX ADMIN — GABAPENTIN 200 MILLIGRAM(S): 400 CAPSULE ORAL at 05:47

## 2023-11-12 RX ADMIN — APIXABAN 2.5 MILLIGRAM(S): 2.5 TABLET, FILM COATED ORAL at 17:32

## 2023-11-12 RX ADMIN — Medication 2: at 08:10

## 2023-11-12 RX ADMIN — Medication 0.5 MILLIGRAM(S): at 17:31

## 2023-11-12 RX ADMIN — DIVALPROEX SODIUM 125 MILLIGRAM(S): 500 TABLET, DELAYED RELEASE ORAL at 13:58

## 2023-11-12 RX ADMIN — APIXABAN 2.5 MILLIGRAM(S): 2.5 TABLET, FILM COATED ORAL at 05:46

## 2023-11-12 RX ADMIN — LOSARTAN POTASSIUM 25 MILLIGRAM(S): 100 TABLET, FILM COATED ORAL at 05:49

## 2023-11-12 NOTE — PROGRESS NOTE ADULT - TIME BILLING
Total time spent to complete patient's bedside assessment, physical examination, review medical chart including labs & imaging, discuss medical plan of care with house staff was more than 35 minutes

## 2023-11-12 NOTE — PROGRESS NOTE ADULT - ASSESSMENT
88 year old female past medical history of Afib on Eliquis,  cva with left hemiplegia, bipolar disorder, Upper gi bleed s/p endoclips with recent ICU admission presenting to ED complaining of headache for 5 hours with gradual onset.    #Headache  CT head negative   s/p IV Tylenol     Plan:   - c/w home meds  - c/w Tylenol   - monitor for changes    #Chest Pain  - Trops negative  - EKG non-ischemic    Plan:  - Repeat Trops  - Monitor    #Reported Hematemesis  - Unlikely this actually happened  - Hemoglobin stable    Plan:   - Restart Eliquis 2.5 BID  - Diet okay  - Daily cbc  - Monitor for changes        DVT Prophylaxis: Eliquis  Code Status: Full    Dispo: Likely DC in 24-48

## 2023-11-12 NOTE — PROGRESS NOTE ADULT - SUBJECTIVE AND OBJECTIVE BOX
JAMES WOODWARD  88y, Female  Allergy: No Known Allergies    Hospital Day: 1d    Patient seen and examined earlier today.  No acute events overnight.     PMH/PSH:  PAST MEDICAL & SURGICAL HISTORY:  Diabetes      Hypertension      Atrial fibrillation      Cerebrovascular accident (CVA)          LAST 24-Hr EVENTS:    VITALS:  T(F): 97.3 (11-12-23 @ 12:52), Max: 99 (11-12-23 @ 04:45)  HR: 87 (11-12-23 @ 12:52)  BP: 126/97 (11-12-23 @ 12:52) (104/55 - 162/74)  RR: 18 (11-12-23 @ 12:52)  SpO2: 98% (11-12-23 @ 04:45)          TESTS & MEASUREMENTS:  Weight/BMI  72.6 (11-10-23 @ 23:35)  26.6 (11-10-23 @ 23:35)                          10.9   7.92  )-----------( 415      ( 11 Nov 2023 11:34 )             35.4         11-11    138  |  103  |  17  ----------------------------<  143<H>  4.3   |  28  |  0.7    Ca    8.9      11 Nov 2023 11:34    TPro  6.2  /  Alb  3.3<L>  /  TBili  0.3  /  DBili  x   /  AST  10  /  ALT  <5  /  AlkPhos  54  11-11    LIVER FUNCTIONS - ( 11 Nov 2023 11:34 )  Alb: 3.3 g/dL / Pro: 6.2 g/dL / ALK PHOS: 54 U/L / ALT: <5 U/L / AST: 10 U/L / GGT: x           CARDIAC MARKERS ( 11 Nov 2023 00:30 )  x     / <0.01 ng/mL / x     / x     / x            Urinalysis Basic - ( 11 Nov 2023 20:37 )    Color: Yellow / Appearance: Clear / SG: >1.030 / pH: x  Gluc: x / Ketone: Negative mg/dL  / Bili: Negative / Urobili: 1.0 mg/dL   Blood: x / Protein: Trace mg/dL / Nitrite: Negative   Leuk Esterase: Negative / RBC: x / WBC x   Sq Epi: x / Non Sq Epi: x / Bacteria: x                    A1C with Estimated Average Glucose Result: 6.4 % (10-26-23 @ 05:05)          RADIOLOGY, ECG, & ADDITIONAL TESTS:  12 Lead ECG:   Ventricular Rate 101 BPM    Atrial Rate 101 BPM    P-R Interval 124 ms    QRS Duration 80 ms    Q-T Interval 336 ms    QTC Calculation(Bazett) 435 ms    P Axis 54 degrees    R Axis -9 degrees    T Axis 18 degrees    Diagnosis Line Sinus tachycardiawith Premature supraventricular complexes  Minimal voltage criteria for LVH, may be normal variant  Poor R wave progression  Abnormal ECG    Confirmed by Lucio Gonzales ([id number]) on 11/11/2023 8:14:10 AM (11-10-23 @ 23:46)    CT Head No Cont:   ACC: [id number] EXAM:  CT BRAIN   ORDERED BY: ANA CATHERINE     PROCEDURE DATE:  11/11/2023          INTERPRETATION:  CLINICAL INDICATION: Headache.    Technique: CT of the head was performed without contrast.    Multiple contiguous axial images were acquired from the skull base to the   vertex without the administration of intravenous contrast.  Coronal and   sagittal reformations were made.    COMPARISON: CT head 10/2/2014    FINDINGS:  Motion degraded exam.    Ventricles and cortical sulcal pattern are age appropriate.    Gray-white differentiation is maintained. No evidence of recent   territorial infarction.    No acute intracranial hemorrhage or extra-axial fluid collection.    No intracranial mass effect or midline shift. No vasogenic edema.    No depressed calvarial fracture. Mastoid air cells are clear without   opacification or coalescence. Visualized paranasal sinuses are clear.   Globes and orbits have a normal CT appearance as visualized.      IMPRESSION:  1.  Portions of the exam are motion degraded and nondiagnostic. Remainder   of the exam demonstrates no acute intracranial pathology.    --- End of Report ---            BOB MESSINA MD; Attending Radiologist  This document has been electronically signed. Nov 11 2023  3:18AM (11-11-23 @ 02:37)    RECENT DIAGNOSTIC ORDERS:      MEDICATIONS:  MEDICATIONS  (STANDING):  apixaban 2.5 milliGRAM(s) Oral two times a day  busPIRone 5 milliGRAM(s) Oral two times a day  dextrose 5%. 1000 milliLiter(s) (50 mL/Hr) IV Continuous <Continuous>  dextrose 5%. 1000 milliLiter(s) (100 mL/Hr) IV Continuous <Continuous>  dextrose 50% Injectable 12.5 Gram(s) IV Push once  dextrose 50% Injectable 25 Gram(s) IV Push once  dextrose 50% Injectable 25 Gram(s) IV Push once  divalproex  milliGRAM(s) Oral three times a day  gabapentin 200 milliGRAM(s) Oral three times a day  glucagon  Injectable 1 milliGRAM(s) IntraMuscular once  insulin lispro (ADMELOG) corrective regimen sliding scale   SubCutaneous three times a day before meals  insulin lispro (ADMELOG) corrective regimen sliding scale   SubCutaneous at bedtime  LORazepam     Tablet 0.5 milliGRAM(s) Oral two times a day  losartan 25 milliGRAM(s) Oral daily  metoprolol tartrate 12.5 milliGRAM(s) Oral two times a day  pantoprazole  Injectable 40 milliGRAM(s) IV Push two times a day  polyethylene glycol 3350 17 Gram(s) Oral daily  senna 2 Tablet(s) Oral at bedtime    MEDICATIONS  (PRN):  acetaminophen     Tablet .. 650 milliGRAM(s) Oral every 6 hours PRN Temp greater or equal to 38C (100.4F), Mild Pain (1 - 3)  aluminum hydroxide/magnesium hydroxide/simethicone Suspension 30 milliLiter(s) Oral every 4 hours PRN Dyspepsia  dextrose Oral Gel 15 Gram(s) Oral once PRN Blood Glucose LESS THAN 70 milliGRAM(s)/deciliter  melatonin 3 milliGRAM(s) Oral at bedtime PRN Insomnia  ondansetron Injectable 4 milliGRAM(s) IV Push every 8 hours PRN Nausea and/or Vomiting      HOME MEDICATIONS:  aluminum hydroxide/magnesium hydroxide/simethicone 200 mg-225 mg-20 mg/5 mL oral suspension (11-11)  BuSpar 5 mg oral tablet (11-11)  Depakote 125 mg oral delayed release tablet (11-11)  Eliquis 2.5 mg oral tablet (11-11)  LORazepam 0.5 mg oral tablet (11-11)  losartan 25 mg oral tablet (11-11)  metFORMIN 500 mg oral tablet (11-11)  Metoprolol Tartrate 25 mg oral tablet (11-11)  MiraLax oral powder for reconstitution (11-11)  Neurontin 100 mg oral capsule (11-11)  Protonix 40 mg oral delayed release tablet (11-11)  Senna Plus 50 mg-8.6 mg oral tablet (11-11)  Tylenol 325 mg oral tablet (11-11)  Vitamin D3 1250 mcg (50,000 intl units) oral capsule (11-11)      PHYSICAL EXAM:  GENERAL: NAD  HEAD:  Atraumatic, Normocephalic  EYES: conjunctiva and sclera clear  NECK: Supple  CHEST/LUNG: Non labored respirations  HEART: regular rate and rhythm   ABDOMEN: Soft, Nondistended   EXTREMITIES:  No clubbing, cyanosis, or edema  PSYCH: AAOx3  NEUROLOGY: Left Hemiparesis   SKIN: No rashes or lesions

## 2023-11-13 ENCOUNTER — TRANSCRIPTION ENCOUNTER (OUTPATIENT)
Age: 88
End: 2023-11-13

## 2023-11-13 VITALS
OXYGEN SATURATION: 99 % | DIASTOLIC BLOOD PRESSURE: 67 MMHG | SYSTOLIC BLOOD PRESSURE: 128 MMHG | RESPIRATION RATE: 17 BRPM | HEART RATE: 86 BPM | TEMPERATURE: 98 F

## 2023-11-13 LAB
GLUCOSE BLDC GLUCOMTR-MCNC: 165 MG/DL — HIGH (ref 70–99)
GLUCOSE BLDC GLUCOMTR-MCNC: 165 MG/DL — HIGH (ref 70–99)
GLUCOSE BLDC GLUCOMTR-MCNC: 177 MG/DL — HIGH (ref 70–99)
GLUCOSE BLDC GLUCOMTR-MCNC: 177 MG/DL — HIGH (ref 70–99)

## 2023-11-13 PROCEDURE — 99239 HOSP IP/OBS DSCHRG MGMT >30: CPT

## 2023-11-13 RX ORDER — PANTOPRAZOLE SODIUM 20 MG/1
40 TABLET, DELAYED RELEASE ORAL
Refills: 0 | Status: DISCONTINUED | OUTPATIENT
Start: 2023-11-13 | End: 2023-11-13

## 2023-11-13 RX ADMIN — GABAPENTIN 200 MILLIGRAM(S): 400 CAPSULE ORAL at 05:51

## 2023-11-13 RX ADMIN — Medication 2: at 11:52

## 2023-11-13 RX ADMIN — DIVALPROEX SODIUM 125 MILLIGRAM(S): 500 TABLET, DELAYED RELEASE ORAL at 15:34

## 2023-11-13 RX ADMIN — Medication 2: at 08:30

## 2023-11-13 RX ADMIN — POLYETHYLENE GLYCOL 3350 17 GRAM(S): 17 POWDER, FOR SOLUTION ORAL at 11:52

## 2023-11-13 RX ADMIN — Medication 5 MILLIGRAM(S): at 05:51

## 2023-11-13 RX ADMIN — LOSARTAN POTASSIUM 25 MILLIGRAM(S): 100 TABLET, FILM COATED ORAL at 05:51

## 2023-11-13 RX ADMIN — APIXABAN 2.5 MILLIGRAM(S): 2.5 TABLET, FILM COATED ORAL at 05:51

## 2023-11-13 RX ADMIN — GABAPENTIN 200 MILLIGRAM(S): 400 CAPSULE ORAL at 13:50

## 2023-11-13 RX ADMIN — Medication 0.5 MILLIGRAM(S): at 05:54

## 2023-11-13 RX ADMIN — PANTOPRAZOLE SODIUM 40 MILLIGRAM(S): 20 TABLET, DELAYED RELEASE ORAL at 05:52

## 2023-11-13 RX ADMIN — DIVALPROEX SODIUM 125 MILLIGRAM(S): 500 TABLET, DELAYED RELEASE ORAL at 06:01

## 2023-11-13 RX ADMIN — Medication 12.5 MILLIGRAM(S): at 05:52

## 2023-11-13 NOTE — DIETITIAN INITIAL EVALUATION ADULT - NS FNS DIET ORDER
Diet, Pureed:   Consistent Carbohydrate {Evening Snack}  DASH/TLC {Sodium & Cholesterol Restricted} (11-11-23 @ 13:06) [Active]

## 2023-11-13 NOTE — DISCHARGE NOTE NURSING/CASE MANAGEMENT/SOCIAL WORK - PATIENT PORTAL LINK FT
You can access the FollowMyHealth Patient Portal offered by [facility] by registering at the following website: [email or web address]. By joining [employer]’s FollowMyHealth portal, you will also be able to view your health information using other applications (apps) compatible with our system.

## 2023-11-13 NOTE — DISCHARGE NOTE PROVIDER - HOSPITAL COURSE
88 year old female past medical history of Afib on Eliquis,  cva with left hemiplegia, bipolar disorder, Upper gi bleed s/p endoclips with recent ICU admission presenting to ED complaining of headache for 5 hours with gradual onset. Per the ED note patient also complained of hematemesis x1. Per [place] "the patient was completely fine, vitals were fine, she did not vomit blood, the daughter just called 911 without telling anyone". Also per the daughter patient did not vomit blood, but came in for head and chest pain.   In the ED vital signs were stable. Hemoglobin was 9.9 (10.3 Nov 3), potassium 6.0 (hemolyzed). CT head was limited by movement, but there was no obvious intracranial pathology.   The pts EKG was negative for Ischaemic changes and trops were negative. His history of hematemesis was not conclusive and his hemoglobin is stable.    #Headache  CT head negative   s/p IV Tylenol   Plan:   - c/w home meds  - c/w Tylenol     #Chest Pain  - Trops negative  - EKG non-ischemic  Plan:  - Repeat trops negative    #Reported Hematemesis  - Unlikely this actually happened  - Hemoglobin stable  Plan:   - C/W Eliquis 2.5 BID  - Diet okay    DVT Prophylaxis: Eliquis

## 2023-11-13 NOTE — DISCHARGE NOTE PROVIDER - NSDCMRMEDTOKEN_GEN_ALL_CORE_FT
aluminum hydroxide/magnesium hydroxide/simethicone 200 mg-225 mg-20 mg/5 mL oral suspension: 20 milliliter(s) orally 2 times a day as needed for dyspepsia  BuSpar 5 mg oral tablet: 1 tab(s) orally 2 times a day  Depakote 125 mg oral delayed release tablet: 1 tab(s) orally 3 times a day  Eliquis 2.5 mg oral tablet: 1 tab(s) orally 2 times a day  LORazepam 0.5 mg oral tablet: 0.5 tab(s) orally 2 times a day  losartan 25 mg oral tablet: 1 tab(s) orally once a day  metFORMIN 500 mg oral tablet: 1 tab(s) orally 2 times a day  Metoprolol Tartrate 25 mg oral tablet: 0.5 tab(s) orally 2 times a day  MiraLax oral powder for reconstitution: 17 each orally once a day  Neurontin 100 mg oral capsule: 2 cap(s) orally 3 times a day  Protonix 40 mg oral delayed release tablet: 1 tab(s) orally once a day  Senna Plus 50 mg-8.6 mg oral tablet: 2 tab(s) orally once a day (at bedtime)  Tylenol 325 mg oral tablet: 2 tab(s) orally every 6 hours as needed for  mild pain  Vitamin D3 1250 mcg (50,000 intl units) oral capsule: 1 cap(s) orally once a month

## 2023-11-13 NOTE — DISCHARGE NOTE PROVIDER - NSDCCPCAREPLAN_GEN_ALL_CORE_FT
PRINCIPAL DISCHARGE DIAGNOSIS  Diagnosis: Anemia  Assessment and Plan of Treatment: Anemia  Anemia is a condition in which the concentration of red blood cells or hemoglobin in the blood is below normal. Hemoglobin is a substance in red blood cells that carries oxygen to the tissues of the body. Anemia results in not enough oxygen reaching these tissues which can cause symptoms such as weakness, dizziness/lightheadedness, shortness of breath, chest pain, paleness, or nausea. The cause of your anemia may or may not be determined immediately. If your hemoglobin was dangerously low, you may have received a blood transfusion. Usually reactions to transfusions occur immediately but monitor yourself for any fevers, rash, or shortness of breath.  SEEK IMMEDIATE MEDICAL CARE IF YOU HAVE ANY OF THE FOLLOWING SYMPTOMS: extreme weakness/chest pain/shortness of breath, black or bloody stools, vomiting blood, fainting, fever, or any signs of dehydration.

## 2023-11-13 NOTE — DIETITIAN INITIAL EVALUATION ADULT - ORAL INTAKE PTA/DIET HISTORY
Patient was able to answer RD's questions appropriately. Patient reports better PO intake PTA as she had dentures. UBW: 160 lbs. Chewing difficulties due to missing teeth. NKFA, no food intolerances reported.     This morning she was able to eat some eggs.

## 2023-11-13 NOTE — DIETITIAN INITIAL EVALUATION ADULT - PERTINENT MEDS FT
MEDICATIONS  (STANDING):  apixaban 2.5 milliGRAM(s) Oral two times a day  busPIRone 5 milliGRAM(s) Oral two times a day  dextrose 5%. 1000 milliLiter(s) (100 mL/Hr) IV Continuous <Continuous>  dextrose 5%. 1000 milliLiter(s) (50 mL/Hr) IV Continuous <Continuous>  dextrose 50% Injectable 25 Gram(s) IV Push once  dextrose 50% Injectable 12.5 Gram(s) IV Push once  dextrose 50% Injectable 25 Gram(s) IV Push once  divalproex  milliGRAM(s) Oral three times a day  gabapentin 200 milliGRAM(s) Oral three times a day  glucagon  Injectable 1 milliGRAM(s) IntraMuscular once  insulin lispro (ADMELOG) corrective regimen sliding scale   SubCutaneous at bedtime  insulin lispro (ADMELOG) corrective regimen sliding scale   SubCutaneous three times a day before meals  LORazepam     Tablet 0.5 milliGRAM(s) Oral two times a day  losartan 25 milliGRAM(s) Oral daily  metoprolol tartrate 12.5 milliGRAM(s) Oral two times a day  pantoprazole    Tablet 40 milliGRAM(s) Oral two times a day  pantoprazole  Injectable 40 milliGRAM(s) IV Push two times a day  polyethylene glycol 3350 17 Gram(s) Oral daily  senna 2 Tablet(s) Oral at bedtime    MEDICATIONS  (PRN):  acetaminophen     Tablet .. 650 milliGRAM(s) Oral every 6 hours PRN Temp greater or equal to 38C (100.4F), Mild Pain (1 - 3)  aluminum hydroxide/magnesium hydroxide/simethicone Suspension 30 milliLiter(s) Oral every 4 hours PRN Dyspepsia  dextrose Oral Gel 15 Gram(s) Oral once PRN Blood Glucose LESS THAN 70 milliGRAM(s)/deciliter  melatonin 3 milliGRAM(s) Oral at bedtime PRN Insomnia  ondansetron Injectable 4 milliGRAM(s) IV Push every 8 hours PRN Nausea and/or Vomiting

## 2023-11-13 NOTE — DIETITIAN INITIAL EVALUATION ADULT - OTHER INFO
88 year old female past medical history of Afib on Eliquis,  cva with left hemiplegia, bipolar disorder, Upper gi bleed s/p endoclips with recent ICU admission presenting to ED complaining of headache for 5 hours with gradual onset.  Headache; Chest Pain; Reported Hematemesis - unlikely this happened - hgb stable

## 2023-11-13 NOTE — DISCHARGE NOTE PROVIDER - NSDCFUSCHEDAPPT_GEN_ALL_CORE_FT
[facility]  [facility] [address]  Scheduled Appointment: 11/27/2023

## 2023-11-13 NOTE — DISCHARGE NOTE PROVIDER - NSDCPNSUBOBJ_GEN_ALL_CORE
Pt was seen and examined at the bedside.  Pt resting comfortably.  no episodes of hematemesis noted in the hospital.  Hb and vital signs stable.  plan for dc to NH.

## 2023-11-13 NOTE — DISCHARGE NOTE NURSING/CASE MANAGEMENT/SOCIAL WORK - NSDCPEFALRISK_GEN_ALL_CORE
For information on Fall & Injury Prevention, visit: [email or web address] OR  [email or web address] OR  https://www.cdc.gov/steadi/patient.html

## 2023-11-13 NOTE — DIETITIAN INITIAL EVALUATION ADULT - PERTINENT LABORATORY DATA
POCT Blood Glucose.: 165 mg/dL (11-13-23 @ 11:03)  A1C with Estimated Average Glucose Result: 6.4 % (10-26-23 @ 05:05)

## 2023-11-14 DIAGNOSIS — I10 ESSENTIAL (PRIMARY) HYPERTENSION: ICD-10-CM

## 2023-11-14 DIAGNOSIS — K25.0 ACUTE GASTRIC ULCER WITH HEMORRHAGE: ICD-10-CM

## 2023-11-14 DIAGNOSIS — A41.9 SEPSIS, UNSPECIFIED ORGANISM: ICD-10-CM

## 2023-11-14 DIAGNOSIS — J69.0 PNEUMONITIS DUE TO INHALATION OF FOOD AND VOMIT: ICD-10-CM

## 2023-11-14 DIAGNOSIS — J96.01 ACUTE RESPIRATORY FAILURE WITH HYPOXIA: ICD-10-CM

## 2023-11-14 DIAGNOSIS — E87.6 HYPOKALEMIA: ICD-10-CM

## 2023-11-14 DIAGNOSIS — K59.00 CONSTIPATION, UNSPECIFIED: ICD-10-CM

## 2023-11-14 DIAGNOSIS — R57.8 OTHER SHOCK: ICD-10-CM

## 2023-11-14 DIAGNOSIS — E11.9 TYPE 2 DIABETES MELLITUS WITHOUT COMPLICATIONS: ICD-10-CM

## 2023-11-14 DIAGNOSIS — I48.91 UNSPECIFIED ATRIAL FIBRILLATION: ICD-10-CM

## 2023-11-14 DIAGNOSIS — K31.82 DIEULAFOY LESION (HEMORRHAGIC) OF STOMACH AND DUODENUM: ICD-10-CM

## 2023-11-14 DIAGNOSIS — Z86.73 PERSONAL HISTORY OF TRANSIENT ISCHEMIC ATTACK (TIA), AND CEREBRAL INFARCTION WITHOUT RESIDUAL DEFICITS: ICD-10-CM

## 2023-11-14 DIAGNOSIS — Z79.01 LONG TERM (CURRENT) USE OF ANTICOAGULANTS: ICD-10-CM

## 2023-11-14 DIAGNOSIS — K29.70 GASTRITIS, UNSPECIFIED, WITHOUT BLEEDING: ICD-10-CM

## 2023-11-24 DIAGNOSIS — R07.9 CHEST PAIN, UNSPECIFIED: ICD-10-CM

## 2023-11-24 DIAGNOSIS — I48.0 PAROXYSMAL ATRIAL FIBRILLATION: ICD-10-CM

## 2023-11-24 DIAGNOSIS — D50.0 IRON DEFICIENCY ANEMIA SECONDARY TO BLOOD LOSS (CHRONIC): ICD-10-CM

## 2023-11-24 DIAGNOSIS — F31.9 BIPOLAR DISORDER, UNSPECIFIED: ICD-10-CM

## 2023-11-24 DIAGNOSIS — Z79.84 LONG TERM (CURRENT) USE OF ORAL HYPOGLYCEMIC DRUGS: ICD-10-CM

## 2023-11-24 DIAGNOSIS — Z79.01 LONG TERM (CURRENT) USE OF ANTICOAGULANTS: ICD-10-CM

## 2023-11-24 DIAGNOSIS — I10 ESSENTIAL (PRIMARY) HYPERTENSION: ICD-10-CM

## 2023-11-24 DIAGNOSIS — E11.9 TYPE 2 DIABETES MELLITUS WITHOUT COMPLICATIONS: ICD-10-CM

## 2023-11-24 DIAGNOSIS — R51.9 HEADACHE, UNSPECIFIED: ICD-10-CM

## 2023-11-24 DIAGNOSIS — I69.354 HEMIPLEGIA AND HEMIPARESIS FOLLOWING CEREBRAL INFARCTION AFFECTING LEFT NON-DOMINANT SIDE: ICD-10-CM

## 2023-11-27 ENCOUNTER — APPOINTMENT (OUTPATIENT)
Dept: ELECTROPHYSIOLOGY | Facility: CLINIC | Age: 88
End: 2023-11-27
Payer: MEDICARE

## 2023-11-27 VITALS
BODY MASS INDEX: 23.46 KG/M2 | SYSTOLIC BLOOD PRESSURE: 120 MMHG | HEART RATE: 99 BPM | DIASTOLIC BLOOD PRESSURE: 80 MMHG | TEMPERATURE: 98 F | HEIGHT: 66 IN | WEIGHT: 146 LBS

## 2023-11-27 VITALS — HEIGHT: 64 IN | BODY MASS INDEX: 25.06 KG/M2

## 2023-11-27 DIAGNOSIS — Z78.9 OTHER SPECIFIED HEALTH STATUS: ICD-10-CM

## 2023-11-27 DIAGNOSIS — Z83.3 FAMILY HISTORY OF DIABETES MELLITUS: ICD-10-CM

## 2023-11-27 PROCEDURE — 99214 OFFICE O/P EST MOD 30 MIN: CPT

## 2023-11-27 PROCEDURE — 93000 ELECTROCARDIOGRAM COMPLETE: CPT

## 2023-12-18 NOTE — END OF VISIT
[FreeTextEntry3] : I, Randy Mercedes, personally performed the services described in this documentation. All medical record entries made by the scribe/nurse CTA were at my direction and in my presence. I have reviewed the chart and agree that the record reflects my personal performance and is accurate and complete.

## 2023-12-18 NOTE — ADDENDUM
[FreeTextEntry1] : Cecilia MOHAN assisted in documentation on 11/27/2023   acting as a scribe for Dr. Randy Mercedes.

## 2023-12-18 NOTE — CARDIOLOGY SUMMARY
[de-identified] : (11/27/2023): ECG sinus tachycardia at 103 bpm, nonspecific T wave abnormalities.   [de-identified] : (10/26/2023): 2D echo. Hyperdynamic LV global systolic function. EF 74%. Normal RV size and function. Sclerotic aortic valve with decreased opening. Mild TR. PA pressure 47 mmHg.   [de-identified] : (10/25/2023) Status post EGD 10/25: Normal mucosa in the whole esophagus. Erosive gastritis. Erythematous oozing lesions in the stomach body concerning for Dieulafoy lesions (endoclips x 4). A large amount of coffee ground material and blood was seen in the duodenal bulb and second part of duodenum. After irrigation, cleaning, and carefully examining under water, there was no evidence of active bleeding or ulcer.

## 2023-12-18 NOTE — DISCUSSION/SUMMARY
[FreeTextEntry1] : Ms. Uday Lan is a pleasant 88-year-old woman with dementia, hypertension, diabetes mellitus, old CVA, paroxysmal atrial fibrillation on Eliquis 2.5 mg twice a day, and upper GI bleed in October 2023 s/p four endoclips.   Patient is a candidate for left atrial appendage closure with a Watchman or Amulet device. I discussed with patient and daughter at length left atrial appendage closure indication, alternatives, and the risks and benefits of the procedure. Patient's understanding of the procedure is limited by her dementia. Patient's daughter expressed understanding of the conversation and would like to go with conservative management for now and will consider left atrial appendage closure in case of recurrence of upper GI bleed.    I recommend to continue Eliquis 2.5 mg twice a day.    I recommend the same medication.    I discussed with patient plan of care in great details. I answered all her questions to her satisfaction. Patient was pleased with the visit.  Patient will follow with me in 6 months' time. Please do not hesitate to contact me at [phone] if you have any further questions regarding this patient care.

## 2024-03-06 ENCOUNTER — APPOINTMENT (OUTPATIENT)
Dept: GASTROENTEROLOGY | Facility: CLINIC | Age: 89
End: 2024-03-06

## 2024-03-11 ENCOUNTER — APPOINTMENT (OUTPATIENT)
Dept: INTERNAL MEDICINE | Facility: CLINIC | Age: 89
End: 2024-03-11

## 2024-05-20 ENCOUNTER — APPOINTMENT (OUTPATIENT)
Dept: ELECTROPHYSIOLOGY | Facility: CLINIC | Age: 89
End: 2024-05-20
Payer: MEDICARE

## 2024-05-20 VITALS
TEMPERATURE: 97.1 F | WEIGHT: 148 LBS | HEART RATE: 115 BPM | DIASTOLIC BLOOD PRESSURE: 80 MMHG | HEIGHT: 64 IN | SYSTOLIC BLOOD PRESSURE: 112 MMHG | BODY MASS INDEX: 25.27 KG/M2

## 2024-05-20 DIAGNOSIS — E11.9 TYPE 2 DIABETES MELLITUS W/OUT COMPLICATIONS: ICD-10-CM

## 2024-05-20 DIAGNOSIS — Z86.73 PERSONAL HISTORY OF TRANSIENT ISCHEMIC ATTACK (TIA), AND CEREBRAL INFARCTION W/OUT RESIDUAL DEFICITS: ICD-10-CM

## 2024-05-20 DIAGNOSIS — F03.90 UNSPECIFIED DEMENTIA W/OUT BEHAVIORAL DISTURBANCE: ICD-10-CM

## 2024-05-20 DIAGNOSIS — I48.91 UNSPECIFIED ATRIAL FIBRILLATION: ICD-10-CM

## 2024-05-20 DIAGNOSIS — I10 ESSENTIAL (PRIMARY) HYPERTENSION: ICD-10-CM

## 2024-05-20 PROCEDURE — 93000 ELECTROCARDIOGRAM COMPLETE: CPT

## 2024-05-20 PROCEDURE — 99214 OFFICE O/P EST MOD 30 MIN: CPT

## 2024-05-20 RX ORDER — GABAPENTIN 100 MG/1
100 CAPSULE ORAL 3 TIMES DAILY
Refills: 0 | Status: ACTIVE | COMMUNITY

## 2024-05-20 RX ORDER — DIVALPROEX SODIUM 125 MG/1
125 TABLET, DELAYED RELEASE ORAL TWICE DAILY
Refills: 0 | Status: ACTIVE | COMMUNITY

## 2024-05-20 RX ORDER — OMEPRAZOLE 20 MG/1
20 CAPSULE, DELAYED RELEASE ORAL TWICE DAILY
Refills: 0 | Status: ACTIVE | COMMUNITY

## 2024-05-20 RX ORDER — ACETAMINOPHEN 325 MG/1
325 TABLET ORAL
Refills: 0 | Status: ACTIVE | COMMUNITY

## 2024-05-20 RX ORDER — TRAMADOL HYDROCHLORIDE 50 MG/1
50 TABLET, COATED ORAL DAILY
Refills: 0 | Status: ACTIVE | COMMUNITY

## 2024-05-20 RX ORDER — TIZANIDINE 2 MG/1
2 TABLET ORAL DAILY
Refills: 0 | Status: ACTIVE | COMMUNITY

## 2024-05-20 RX ORDER — CHOLECALCIFEROL (VITAMIN D3) 1250 MCG
1.25 MG CAPSULE ORAL
Refills: 0 | Status: ACTIVE | COMMUNITY

## 2024-05-20 RX ORDER — CHLORHEXIDINE GLUCONATE 4 %
5 LIQUID (ML) TOPICAL DAILY
Refills: 0 | Status: ACTIVE | COMMUNITY

## 2024-05-20 RX ORDER — METFORMIN HYDROCHLORIDE 500 MG/1
500 TABLET, COATED ORAL
Qty: 180 | Refills: 1 | Status: ACTIVE | COMMUNITY

## 2024-05-20 RX ORDER — BUSPIRONE HCL 5 MG
5 TABLET ORAL
Refills: 0 | Status: ACTIVE | COMMUNITY

## 2024-05-20 RX ORDER — LORAZEPAM 0.5 MG/1
0.5 TABLET ORAL TWICE DAILY
Refills: 0 | Status: ACTIVE | COMMUNITY

## 2024-05-20 RX ORDER — LOSARTAN POTASSIUM 25 MG/1
25 TABLET, FILM COATED ORAL
Qty: 90 | Refills: 3 | Status: ACTIVE | COMMUNITY

## 2024-05-20 RX ORDER — LORATADINE 10 MG
17 TABLET,DISINTEGRATING ORAL DAILY
Refills: 0 | Status: ACTIVE | COMMUNITY

## 2024-05-20 RX ORDER — CHLORHEXIDINE GLUCONATE 4 %
325 (65 FE) LIQUID (ML) TOPICAL DAILY
Qty: 90 | Refills: 3 | Status: ACTIVE | COMMUNITY

## 2024-05-20 RX ORDER — DOCUSATE SODIUM 50 MG AND SENNOSIDES 8.6 MG 8.6; 5 MG/1; MG/1
8.6-5 TABLET, FILM COATED ORAL TWICE DAILY
Refills: 0 | Status: ACTIVE | COMMUNITY

## 2024-05-20 RX ORDER — APIXABAN 2.5 MG/1
2.5 TABLET, FILM COATED ORAL
Qty: 180 | Refills: 3 | Status: ACTIVE | COMMUNITY

## 2024-05-20 RX ORDER — METOPROLOL TARTRATE 25 MG/1
25 TABLET, FILM COATED ORAL TWICE DAILY
Qty: 60 | Refills: 2 | Status: ACTIVE | COMMUNITY

## 2024-05-20 NOTE — HISTORY OF PRESENT ILLNESS
[FreeTextEntry1] : Dementia, hypertension, diabetes mellitus, old CVA, paroxysmal atrial fibrillation on Eliquis 2.5 mg twice per day, and upper GI bleed in October 2023.     Patient presented to [facility] for upper GI bleed and was in hemorrhagic shock. She was intubated and admitted to the ICU. She received blood transfusion. EGD on 10/25/2023 showed erosive gastritis, erythematous oozing lesion in the body of the stomach concerning for dieulafoy lesions. Patient underwent endoclip x4 and she was restarted on anticoagulation.      Patient presents today for nursing home with daughter to discuss left atrial appendage closure.    11/27/2023: seen in EP. Discussed BRANDY closure  5/20/2024: no bleeding. No falls. bed-bound. require assistance with feeding. She has no angina, no shortness of breath at res, no dizziness, no lightheadedness, and no syncope. She presents for evaluation.

## 2024-05-20 NOTE — PHYSICAL EXAM
[No Acute Distress] : no acute distress [Frail] : frail [Cachectic] : cachexia was observed [Normal Conjunctiva] : normal conjunctiva [Normal Venous Pressure] : normal venous pressure [No Carotid Bruit] : no carotid bruit [Normal S1, S2] : normal S1, S2 [No Murmur] : no murmur [No Rub] : no rub [No Gallop] : no gallop [Clear Lung Fields] : clear lung fields [Good Air Entry] : good air entry [No Respiratory Distress] : no respiratory distress  [Soft] : abdomen soft [Non Tender] : non-tender [No Masses/organomegaly] : no masses/organomegaly [Normal Bowel Sounds] : normal bowel sounds [Normal Gait] : normal gait [No Edema] : no edema [No Cyanosis] : no cyanosis [No Clubbing] : no clubbing [No Varicosities] : no varicosities [No Rash] : no rash [No Skin Lesions] : no skin lesions [Moves all extremities] : moves all extremities [No Focal Deficits] : no focal deficits [Normal Speech] : normal speech [Alert and Oriented] : alert and oriented [Normal memory] : normal memory

## 2024-05-20 NOTE — REASON FOR VISIT
[Arrhythmia/ECG Abnorrmalities] : arrhythmia/ECG abnormalities [FreeTextEntry3] : Dr. Betzy Heller  [FreeTextEntry1] : Daughter Luanne Springer [phone]

## 2024-05-20 NOTE — DISCUSSION/SUMMARY
[FreeTextEntry1] : Ms. Uday Lan is a pleasant 88-year-old woman with dementia, hypertension, diabetes mellitus, old CVA, paroxysmal atrial fibrillation on Eliquis 2.5 mg twice a day, and upper GI bleed in October 2023 s/p four endoclips.   Patient is a candidate for left atrial appendage closure with a Watchman or Amulet device. I discussed with patient and daughter at length left atrial appendage closure indication, alternatives, and the risks and benefits of the procedure. Patient's understanding of the procedure is limited by her dementia. Patient's daughter expressed understanding of the conversation and would like to go with conservative management for now and will consider left atrial appendage closure in case of recurrence of upper GI bleed.    I recommend continuing Eliquis 2.5 mg twice a day.    I recommend increasing Metoprolol from 12.5 mg q12h to 25 mg q12h on 5/20/2024.  I discussed with patient plan of care in great details. I answered all her questions to her satisfaction. Patient was pleased with the visit.  Patient will follow with her PCP. Please do not hesitate to contact me at [phone] if you have any further questions regarding this patient care. [EKG obtained to assist in diagnosis and management of assessed problem(s)] : EKG obtained to assist in diagnosis and management of assessed problem(s)

## 2024-05-20 NOTE — CARDIOLOGY SUMMARY
[de-identified] : (5/20/2024): ECG sinus tachycardia at 115 bpm, nonspecific T wave abnormalities.   (11/27/2023): ECG sinus tachycardia at 103 bpm, nonspecific T wave abnormalities.   [de-identified] : (10/26/2023): 2D echo. Hyperdynamic LV global systolic function. EF 74%. Normal RV size and function. Sclerotic aortic valve with decreased opening. Mild TR. PA pressure 47 mmHg.   [de-identified] : (10/25/2023) Status post EGD 10/25: Normal mucosa in the whole esophagus. Erosive gastritis. Erythematous oozing lesions in the stomach body concerning for Dieulafoy lesions (endoclips x 4). A large amount of coffee ground material and blood was seen in the duodenal bulb and second part of duodenum. After irrigation, cleaning, and carefully examining under water, there was no evidence of active bleeding or ulcer.

## 2024-07-02 ENCOUNTER — OUTPATIENT (OUTPATIENT)
Dept: OUTPATIENT SERVICES | Facility: HOSPITAL | Age: 89
LOS: 1 days | End: 2024-07-02
Payer: MEDICARE

## 2024-07-02 DIAGNOSIS — Z00.8 ENCOUNTER FOR OTHER GENERAL EXAMINATION: ICD-10-CM

## 2024-07-02 DIAGNOSIS — R10.9 UNSPECIFIED ABDOMINAL PAIN: ICD-10-CM

## 2024-07-02 PROCEDURE — 74177 CT ABD & PELVIS W/CONTRAST: CPT

## 2024-07-02 PROCEDURE — 74177 CT ABD & PELVIS W/CONTRAST: CPT | Mod: 26

## 2024-07-03 DIAGNOSIS — R10.9 UNSPECIFIED ABDOMINAL PAIN: ICD-10-CM

## 2024-07-10 ENCOUNTER — APPOINTMENT (OUTPATIENT)
Dept: CARDIOLOGY | Facility: CLINIC | Age: 89
End: 2024-07-10
Payer: MEDICARE

## 2024-07-10 ENCOUNTER — NON-APPOINTMENT (OUTPATIENT)
Age: 89
End: 2024-07-10

## 2024-07-10 ENCOUNTER — OUTPATIENT (OUTPATIENT)
Dept: OUTPATIENT SERVICES | Facility: HOSPITAL | Age: 89
LOS: 1 days | End: 2024-07-10
Payer: MEDICAID

## 2024-07-10 ENCOUNTER — APPOINTMENT (OUTPATIENT)
Dept: GASTROENTEROLOGY | Facility: CLINIC | Age: 89
End: 2024-07-10
Payer: MEDICAID

## 2024-07-10 VITALS
DIASTOLIC BLOOD PRESSURE: 64 MMHG | TEMPERATURE: 97.4 F | HEART RATE: 74 BPM | SYSTOLIC BLOOD PRESSURE: 94 MMHG | OXYGEN SATURATION: 97 %

## 2024-07-10 VITALS — SYSTOLIC BLOOD PRESSURE: 100 MMHG | DIASTOLIC BLOOD PRESSURE: 70 MMHG

## 2024-07-10 DIAGNOSIS — R10.13 EPIGASTRIC PAIN: ICD-10-CM

## 2024-07-10 DIAGNOSIS — Z86.73 PERSONAL HISTORY OF TRANSIENT ISCHEMIC ATTACK (TIA), AND CEREBRAL INFARCTION W/OUT RESIDUAL DEFICITS: ICD-10-CM

## 2024-07-10 DIAGNOSIS — I48.91 UNSPECIFIED ATRIAL FIBRILLATION: ICD-10-CM

## 2024-07-10 DIAGNOSIS — F03.90 UNSPECIFIED DEMENTIA W/OUT BEHAVIORAL DISTURBANCE: ICD-10-CM

## 2024-07-10 DIAGNOSIS — Z00.00 ENCOUNTER FOR GENERAL ADULT MEDICAL EXAMINATION WITHOUT ABNORMAL FINDINGS: ICD-10-CM

## 2024-07-10 PROCEDURE — 99204 OFFICE O/P NEW MOD 45 MIN: CPT

## 2024-07-10 PROCEDURE — 99203 OFFICE O/P NEW LOW 30 MIN: CPT

## 2024-07-10 PROCEDURE — 93000 ELECTROCARDIOGRAM COMPLETE: CPT

## 2024-07-10 NOTE — PATIENT PROFILE ADULT - PATIENT'S GENDER IDENTITY
The patient is Watcher - Medium risk of patient condition declining or worsening    Shift Goals  Clinical Goals: hemodynamic stability, monitor I/O  Patient Goals: rest  Family Goals: updates    Progress made toward(s) clinical / shift goals:       Problem: Pain - Standard  Goal: Alleviation of pain or a reduction in pain to the patient’s comfort goal  Outcome: Progressing     Problem: Fall Risk  Goal: Patient will remain free from falls  Outcome: Progressing     Problem: Urinary Elimination  Goal: Establish and maintain regular urinary output  Outcome: Progressing     Problem: Bowel Elimination  Goal: Establish and maintain regular bowel function  Outcome: Progressing     Problem: Hemodynamics  Goal: Patient's hemodynamics, fluid balance and neurologic status will be stable or improve  Outcome: Progressing     Problem: Fluid Volume  Goal: Fluid volume balance will be maintained  Outcome: Progressing     Problem: Urinary - Renal Perfusion  Goal: Ability to achieve and maintain adequate renal perfusion and functioning will improve  Outcome: Progressing            Female

## 2024-07-17 DIAGNOSIS — R10.13 EPIGASTRIC PAIN: ICD-10-CM

## 2024-08-13 ENCOUNTER — INPATIENT (INPATIENT)
Facility: HOSPITAL | Age: 89
LOS: 4 days | Discharge: NURSING, REHAB OR LONG-TERM CARE | DRG: 179 | End: 2024-08-18
Attending: INTERNAL MEDICINE | Admitting: INTERNAL MEDICINE
Payer: MEDICARE

## 2024-08-13 VITALS
HEART RATE: 116 BPM | RESPIRATION RATE: 20 BRPM | SYSTOLIC BLOOD PRESSURE: 93 MMHG | DIASTOLIC BLOOD PRESSURE: 61 MMHG | OXYGEN SATURATION: 98 % | TEMPERATURE: 102 F

## 2024-08-13 PROCEDURE — 99291 CRITICAL CARE FIRST HOUR: CPT

## 2024-08-13 RX ORDER — ACETAMINOPHEN 325 MG/1
975 TABLET ORAL ONCE
Refills: 0 | Status: COMPLETED | OUTPATIENT
Start: 2024-08-13 | End: 2024-08-13

## 2024-08-13 RX ORDER — CEFEPIME 2 G/1
1000 INJECTION, POWDER, FOR SOLUTION INTRAVENOUS ONCE
Refills: 0 | Status: COMPLETED | OUTPATIENT
Start: 2024-08-13 | End: 2024-08-13

## 2024-08-14 DIAGNOSIS — U07.1 COVID-19: ICD-10-CM

## 2024-08-14 LAB
ALBUMIN SERPL ELPH-MCNC: 3.6 G/DL — SIGNIFICANT CHANGE UP (ref 3.5–5.2)
ALBUMIN SERPL ELPH-MCNC: 3.6 G/DL — SIGNIFICANT CHANGE UP (ref 3.5–5.2)
ALP SERPL-CCNC: 55 U/L — SIGNIFICANT CHANGE UP (ref 30–115)
ALP SERPL-CCNC: 58 U/L — SIGNIFICANT CHANGE UP (ref 30–115)
ALT FLD-CCNC: 6 U/L — SIGNIFICANT CHANGE UP (ref 0–41)
ALT FLD-CCNC: 7 U/L — SIGNIFICANT CHANGE UP (ref 0–41)
ANION GAP SERPL CALC-SCNC: 11 MMOL/L — SIGNIFICANT CHANGE UP (ref 7–14)
ANION GAP SERPL CALC-SCNC: 12 MMOL/L — SIGNIFICANT CHANGE UP (ref 7–14)
APPEARANCE UR: ABNORMAL
APTT BLD: 19.5 SEC — CRITICAL LOW (ref 27–39.2)
AST SERPL-CCNC: 15 U/L — SIGNIFICANT CHANGE UP (ref 0–41)
AST SERPL-CCNC: 21 U/L — SIGNIFICANT CHANGE UP (ref 0–41)
BASOPHILS # BLD AUTO: 0.04 K/UL — SIGNIFICANT CHANGE UP (ref 0–0.2)
BASOPHILS # BLD AUTO: 0.04 K/UL — SIGNIFICANT CHANGE UP (ref 0–0.2)
BASOPHILS NFR BLD AUTO: 0.5 % — SIGNIFICANT CHANGE UP (ref 0–1)
BASOPHILS NFR BLD AUTO: 0.5 % — SIGNIFICANT CHANGE UP (ref 0–1)
BILIRUB SERPL-MCNC: <0.2 MG/DL — SIGNIFICANT CHANGE UP (ref 0.2–1.2)
BILIRUB SERPL-MCNC: <0.2 MG/DL — SIGNIFICANT CHANGE UP (ref 0.2–1.2)
BILIRUB UR-MCNC: NEGATIVE — SIGNIFICANT CHANGE UP
BUN SERPL-MCNC: 24 MG/DL — HIGH (ref 10–20)
BUN SERPL-MCNC: 28 MG/DL — HIGH (ref 10–20)
CALCIUM SERPL-MCNC: 8.8 MG/DL — SIGNIFICANT CHANGE UP (ref 8.4–10.4)
CALCIUM SERPL-MCNC: 8.9 MG/DL — SIGNIFICANT CHANGE UP (ref 8.4–10.5)
CHLORIDE SERPL-SCNC: 103 MMOL/L — SIGNIFICANT CHANGE UP (ref 98–110)
CHLORIDE SERPL-SCNC: 105 MMOL/L — SIGNIFICANT CHANGE UP (ref 98–110)
CO2 SERPL-SCNC: 22 MMOL/L — SIGNIFICANT CHANGE UP (ref 17–32)
CO2 SERPL-SCNC: 25 MMOL/L — SIGNIFICANT CHANGE UP (ref 17–32)
COLOR SPEC: YELLOW — SIGNIFICANT CHANGE UP
CREAT SERPL-MCNC: 0.8 MG/DL — SIGNIFICANT CHANGE UP (ref 0.7–1.5)
CREAT SERPL-MCNC: 1 MG/DL — SIGNIFICANT CHANGE UP (ref 0.7–1.5)
DIFF PNL FLD: ABNORMAL
EGFR: 55 ML/MIN/1.73M2 — LOW
EGFR: 71 ML/MIN/1.73M2 — SIGNIFICANT CHANGE UP
EOSINOPHIL # BLD AUTO: 0.04 K/UL — SIGNIFICANT CHANGE UP (ref 0–0.7)
EOSINOPHIL # BLD AUTO: 0.05 K/UL — SIGNIFICANT CHANGE UP (ref 0–0.7)
EOSINOPHIL NFR BLD AUTO: 0.5 % — SIGNIFICANT CHANGE UP (ref 0–8)
EOSINOPHIL NFR BLD AUTO: 0.6 % — SIGNIFICANT CHANGE UP (ref 0–8)
FLUAV AG NPH QL: SIGNIFICANT CHANGE UP
FLUBV AG NPH QL: SIGNIFICANT CHANGE UP
GAS PNL BLDV: SIGNIFICANT CHANGE UP
GLUCOSE BLDC GLUCOMTR-MCNC: 102 MG/DL — HIGH (ref 70–99)
GLUCOSE BLDC GLUCOMTR-MCNC: 64 MG/DL — LOW (ref 70–99)
GLUCOSE BLDC GLUCOMTR-MCNC: 72 MG/DL — SIGNIFICANT CHANGE UP (ref 70–99)
GLUCOSE BLDC GLUCOMTR-MCNC: 93 MG/DL — SIGNIFICANT CHANGE UP (ref 70–99)
GLUCOSE BLDC GLUCOMTR-MCNC: 97 MG/DL — SIGNIFICANT CHANGE UP (ref 70–99)
GLUCOSE SERPL-MCNC: 142 MG/DL — HIGH (ref 70–99)
GLUCOSE SERPL-MCNC: 187 MG/DL — HIGH (ref 70–99)
GLUCOSE UR QL: >=1000 MG/DL
HCT VFR BLD CALC: 36.7 % — LOW (ref 37–47)
HCT VFR BLD CALC: 38.5 % — SIGNIFICANT CHANGE UP (ref 37–47)
HGB BLD-MCNC: 11.9 G/DL — LOW (ref 12–16)
HGB BLD-MCNC: 12.1 G/DL — SIGNIFICANT CHANGE UP (ref 12–16)
IMM GRANULOCYTES NFR BLD AUTO: 0.2 % — SIGNIFICANT CHANGE UP (ref 0.1–0.3)
IMM GRANULOCYTES NFR BLD AUTO: 0.4 % — HIGH (ref 0.1–0.3)
INR BLD: 1.14 RATIO — SIGNIFICANT CHANGE UP (ref 0.65–1.3)
KETONES UR-MCNC: NEGATIVE MG/DL — SIGNIFICANT CHANGE UP
LACTATE SERPL-SCNC: 2 MMOL/L — SIGNIFICANT CHANGE UP (ref 0.7–2)
LEUKOCYTE ESTERASE UR-ACNC: ABNORMAL
LYMPHOCYTES # BLD AUTO: 1.21 K/UL — SIGNIFICANT CHANGE UP (ref 1.2–3.4)
LYMPHOCYTES # BLD AUTO: 1.95 K/UL — SIGNIFICANT CHANGE UP (ref 1.2–3.4)
LYMPHOCYTES # BLD AUTO: 13.7 % — LOW (ref 20.5–51.1)
LYMPHOCYTES # BLD AUTO: 24.6 % — SIGNIFICANT CHANGE UP (ref 20.5–51.1)
MCHC RBC-ENTMCNC: 26.8 PG — LOW (ref 27–31)
MCHC RBC-ENTMCNC: 27.3 PG — SIGNIFICANT CHANGE UP (ref 27–31)
MCHC RBC-ENTMCNC: 31.4 G/DL — LOW (ref 32–37)
MCHC RBC-ENTMCNC: 32.4 G/DL — SIGNIFICANT CHANGE UP (ref 32–37)
MCV RBC AUTO: 84.2 FL — SIGNIFICANT CHANGE UP (ref 81–99)
MCV RBC AUTO: 85.4 FL — SIGNIFICANT CHANGE UP (ref 81–99)
MONOCYTES # BLD AUTO: 0.92 K/UL — HIGH (ref 0.1–0.6)
MONOCYTES # BLD AUTO: 1.13 K/UL — HIGH (ref 0.1–0.6)
MONOCYTES NFR BLD AUTO: 11.6 % — HIGH (ref 1.7–9.3)
MONOCYTES NFR BLD AUTO: 12.8 % — HIGH (ref 1.7–9.3)
NEUTROPHILS # BLD AUTO: 4.95 K/UL — SIGNIFICANT CHANGE UP (ref 1.4–6.5)
NEUTROPHILS # BLD AUTO: 6.39 K/UL — SIGNIFICANT CHANGE UP (ref 1.4–6.5)
NEUTROPHILS NFR BLD AUTO: 62.3 % — SIGNIFICANT CHANGE UP (ref 42.2–75.2)
NEUTROPHILS NFR BLD AUTO: 72.3 % — SIGNIFICANT CHANGE UP (ref 42.2–75.2)
NITRITE UR-MCNC: NEGATIVE — SIGNIFICANT CHANGE UP
NRBC # BLD: 0 /100 WBCS — SIGNIFICANT CHANGE UP (ref 0–0)
NRBC # BLD: 0 /100 WBCS — SIGNIFICANT CHANGE UP (ref 0–0)
PH UR: 6.5 — SIGNIFICANT CHANGE UP (ref 5–8)
PLATELET # BLD AUTO: 215 K/UL — SIGNIFICANT CHANGE UP (ref 130–400)
PLATELET # BLD AUTO: 263 K/UL — SIGNIFICANT CHANGE UP (ref 130–400)
PMV BLD: 11.1 FL — HIGH (ref 7.4–10.4)
PMV BLD: 11.6 FL — HIGH (ref 7.4–10.4)
POTASSIUM SERPL-MCNC: 4.4 MMOL/L — SIGNIFICANT CHANGE UP (ref 3.5–5)
POTASSIUM SERPL-MCNC: 4.7 MMOL/L — SIGNIFICANT CHANGE UP (ref 3.5–5)
POTASSIUM SERPL-SCNC: 4.4 MMOL/L — SIGNIFICANT CHANGE UP (ref 3.5–5)
POTASSIUM SERPL-SCNC: 4.7 MMOL/L — SIGNIFICANT CHANGE UP (ref 3.5–5)
PROCALCITONIN SERPL-MCNC: 0.24 NG/ML — HIGH (ref 0.02–0.1)
PROT SERPL-MCNC: 5.9 G/DL — LOW (ref 6–8)
PROT SERPL-MCNC: 6.3 G/DL — SIGNIFICANT CHANGE UP (ref 6–8)
PROT UR-MCNC: 30 MG/DL
PROTHROM AB SERPL-ACNC: 13 SEC — HIGH (ref 9.95–12.87)
RBC # BLD: 4.36 M/UL — SIGNIFICANT CHANGE UP (ref 4.2–5.4)
RBC # BLD: 4.51 M/UL — SIGNIFICANT CHANGE UP (ref 4.2–5.4)
RBC # FLD: 17.8 % — HIGH (ref 11.5–14.5)
RBC # FLD: 17.8 % — HIGH (ref 11.5–14.5)
RSV RNA NPH QL NAA+NON-PROBE: SIGNIFICANT CHANGE UP
SARS-COV-2 RNA SPEC QL NAA+PROBE: DETECTED
SODIUM SERPL-SCNC: 139 MMOL/L — SIGNIFICANT CHANGE UP (ref 135–146)
SODIUM SERPL-SCNC: 139 MMOL/L — SIGNIFICANT CHANGE UP (ref 135–146)
SP GR SPEC: 1.03 — SIGNIFICANT CHANGE UP (ref 1–1.03)
UROBILINOGEN FLD QL: 1 MG/DL — SIGNIFICANT CHANGE UP (ref 0.2–1)
WBC # BLD: 7.94 K/UL — SIGNIFICANT CHANGE UP (ref 4.8–10.8)
WBC # BLD: 8.83 K/UL — SIGNIFICANT CHANGE UP (ref 4.8–10.8)
WBC # FLD AUTO: 7.94 K/UL — SIGNIFICANT CHANGE UP (ref 4.8–10.8)
WBC # FLD AUTO: 8.83 K/UL — SIGNIFICANT CHANGE UP (ref 4.8–10.8)

## 2024-08-14 PROCEDURE — 80076 HEPATIC FUNCTION PANEL: CPT

## 2024-08-14 PROCEDURE — 84100 ASSAY OF PHOSPHORUS: CPT

## 2024-08-14 PROCEDURE — 83036 HEMOGLOBIN GLYCOSYLATED A1C: CPT

## 2024-08-14 PROCEDURE — 82962 GLUCOSE BLOOD TEST: CPT

## 2024-08-14 PROCEDURE — 92526 ORAL FUNCTION THERAPY: CPT | Mod: GN

## 2024-08-14 PROCEDURE — 85025 COMPLETE CBC W/AUTO DIFF WBC: CPT

## 2024-08-14 PROCEDURE — 93010 ELECTROCARDIOGRAM REPORT: CPT

## 2024-08-14 PROCEDURE — 92610 EVALUATE SWALLOWING FUNCTION: CPT | Mod: GN

## 2024-08-14 PROCEDURE — 85027 COMPLETE CBC AUTOMATED: CPT

## 2024-08-14 PROCEDURE — 83735 ASSAY OF MAGNESIUM: CPT

## 2024-08-14 PROCEDURE — 80053 COMPREHEN METABOLIC PANEL: CPT

## 2024-08-14 PROCEDURE — 83605 ASSAY OF LACTIC ACID: CPT

## 2024-08-14 PROCEDURE — 36415 COLL VENOUS BLD VENIPUNCTURE: CPT

## 2024-08-14 PROCEDURE — 80048 BASIC METABOLIC PNL TOTAL CA: CPT

## 2024-08-14 PROCEDURE — 71045 X-RAY EXAM CHEST 1 VIEW: CPT | Mod: 26

## 2024-08-14 RX ORDER — DIVALPROEX SODIUM 125 MG/1
125 CAPSULE, DELAYED RELEASE ORAL
Refills: 0 | Status: DISCONTINUED | OUTPATIENT
Start: 2024-08-14 | End: 2024-08-18

## 2024-08-14 RX ORDER — ONDANSETRON 2 MG/ML
4 INJECTION, SOLUTION INTRAMUSCULAR; INTRAVENOUS EVERY 8 HOURS
Refills: 0 | Status: DISCONTINUED | OUTPATIENT
Start: 2024-08-14 | End: 2024-08-18

## 2024-08-14 RX ORDER — PANTOPRAZOLE SODIUM 40 MG
40 TABLET, DELAYED RELEASE (ENTERIC COATED) ORAL
Refills: 0 | Status: DISCONTINUED | OUTPATIENT
Start: 2024-08-14 | End: 2024-08-18

## 2024-08-14 RX ORDER — SUCRALFATE 1 G/10ML
1 SUSPENSION ORAL
Refills: 0 | Status: DISCONTINUED | OUTPATIENT
Start: 2024-08-14 | End: 2024-08-18

## 2024-08-14 RX ORDER — ACETAMINOPHEN 325 MG/1
650 TABLET ORAL EVERY 6 HOURS
Refills: 0 | Status: DISCONTINUED | OUTPATIENT
Start: 2024-08-14 | End: 2024-08-18

## 2024-08-14 RX ORDER — OMEPRAZOLE 40 MG/1
1 CAPSULE, DELAYED RELEASE ORAL
Refills: 0 | DISCHARGE

## 2024-08-14 RX ORDER — REMDESIVIR 5 MG/ML
200 INJECTION INTRAVENOUS EVERY 24 HOURS
Refills: 0 | Status: COMPLETED | OUTPATIENT
Start: 2024-08-14 | End: 2024-08-14

## 2024-08-14 RX ORDER — BUSPIRONE HYDROCHLORIDE 30 MG/1
5 TABLET ORAL
Refills: 0 | Status: DISCONTINUED | OUTPATIENT
Start: 2024-08-14 | End: 2024-08-18

## 2024-08-14 RX ORDER — GABAPENTIN 100 MG
1 CAPSULE ORAL
Refills: 0 | DISCHARGE

## 2024-08-14 RX ORDER — MAGNESIUM, ALUMINUM HYDROXIDE 200-225/5
30 SUSPENSION, ORAL (FINAL DOSE FORM) ORAL EVERY 4 HOURS
Refills: 0 | Status: DISCONTINUED | OUTPATIENT
Start: 2024-08-14 | End: 2024-08-18

## 2024-08-14 RX ORDER — LOSARTAN POTASSIUM 50 MG/1
25 TABLET ORAL DAILY
Refills: 0 | Status: DISCONTINUED | OUTPATIENT
Start: 2024-08-14 | End: 2024-08-18

## 2024-08-14 RX ORDER — SENNA 187 MG
2 TABLET ORAL AT BEDTIME
Refills: 0 | Status: DISCONTINUED | OUTPATIENT
Start: 2024-08-14 | End: 2024-08-18

## 2024-08-14 RX ORDER — NYSTATIN 100000/G
1 CREAM (GRAM) TOPICAL
Refills: 0 | DISCHARGE

## 2024-08-14 RX ORDER — METOPROLOL TARTRATE 100 MG/1
25 TABLET ORAL
Refills: 0 | Status: DISCONTINUED | OUTPATIENT
Start: 2024-08-14 | End: 2024-08-18

## 2024-08-14 RX ORDER — PARENTERAL AMINO ACID 10% NO.4 10 %
25 INTRAVENOUS SOLUTION INTRAVENOUS ONCE
Refills: 0 | Status: DISCONTINUED | OUTPATIENT
Start: 2024-08-14 | End: 2024-08-18

## 2024-08-14 RX ORDER — SUCRALFATE 1 G/10ML
1 SUSPENSION ORAL
Refills: 0 | DISCHARGE

## 2024-08-14 RX ORDER — REMDESIVIR 5 MG/ML
INJECTION INTRAVENOUS
Refills: 0 | Status: DISCONTINUED | OUTPATIENT
Start: 2024-08-14 | End: 2024-08-18

## 2024-08-14 RX ORDER — ACETAMINOPHEN 325 MG/1
1000 TABLET ORAL ONCE
Refills: 0 | Status: COMPLETED | OUTPATIENT
Start: 2024-08-14 | End: 2024-08-14

## 2024-08-14 RX ORDER — LORAZEPAM 4 MG/ML
0.5 INJECTION INTRAMUSCULAR; INTRAVENOUS ONCE
Refills: 0 | Status: DISCONTINUED | OUTPATIENT
Start: 2024-08-14 | End: 2024-08-14

## 2024-08-14 RX ORDER — METOPROLOL TARTRATE 100 MG/1
5 TABLET ORAL ONCE
Refills: 0 | Status: COMPLETED | OUTPATIENT
Start: 2024-08-14 | End: 2024-08-14

## 2024-08-14 RX ORDER — LORAZEPAM 4 MG/ML
0.25 INJECTION INTRAMUSCULAR; INTRAVENOUS
Refills: 0 | Status: DISCONTINUED | OUTPATIENT
Start: 2024-08-14 | End: 2024-08-18

## 2024-08-14 RX ORDER — CHLORHEXIDINE GLUCONATE 40 MG/ML
1 SOLUTION TOPICAL
Refills: 0 | Status: DISCONTINUED | OUTPATIENT
Start: 2024-08-14 | End: 2024-08-18

## 2024-08-14 RX ORDER — INSULIN GLARGINE 100 [IU]/ML
10 INJECTION, SOLUTION SUBCUTANEOUS AT BEDTIME
Refills: 0 | Status: DISCONTINUED | OUTPATIENT
Start: 2024-08-14 | End: 2024-08-14

## 2024-08-14 RX ORDER — DEXTROSE 15 G/33 G
15 GEL IN PACKET (GRAM) ORAL ONCE
Refills: 0 | Status: DISCONTINUED | OUTPATIENT
Start: 2024-08-14 | End: 2024-08-18

## 2024-08-14 RX ORDER — REMDESIVIR 5 MG/ML
100 INJECTION INTRAVENOUS EVERY 24 HOURS
Refills: 0 | Status: DISCONTINUED | OUTPATIENT
Start: 2024-08-15 | End: 2024-08-18

## 2024-08-14 RX ORDER — APIXABAN 5 MG/1
2.5 TABLET, FILM COATED ORAL EVERY 12 HOURS
Refills: 0 | Status: DISCONTINUED | OUTPATIENT
Start: 2024-08-14 | End: 2024-08-18

## 2024-08-14 RX ORDER — GABAPENTIN 100 MG
300 CAPSULE ORAL DAILY
Refills: 0 | Status: DISCONTINUED | OUTPATIENT
Start: 2024-08-14 | End: 2024-08-18

## 2024-08-14 RX ORDER — CASEIN/A,D/METHYLBNZ/PETROLAT
1 OINTMENT (GRAM) TOPICAL
Refills: 0 | DISCHARGE

## 2024-08-14 RX ADMIN — LORAZEPAM 0.5 MILLIGRAM(S): 4 INJECTION INTRAMUSCULAR; INTRAVENOUS at 04:30

## 2024-08-14 RX ADMIN — SUCRALFATE 1 GRAM(S): 1 SUSPENSION ORAL at 07:00

## 2024-08-14 RX ADMIN — METOPROLOL TARTRATE 25 MILLIGRAM(S): 100 TABLET ORAL at 06:35

## 2024-08-14 RX ADMIN — LORAZEPAM 0.25 MILLIGRAM(S): 4 INJECTION INTRAMUSCULAR; INTRAVENOUS at 22:07

## 2024-08-14 RX ADMIN — METOPROLOL TARTRATE 5 MILLIGRAM(S): 100 TABLET ORAL at 04:51

## 2024-08-14 RX ADMIN — ACETAMINOPHEN 400 MILLIGRAM(S): 325 TABLET ORAL at 13:51

## 2024-08-14 RX ADMIN — ACETAMINOPHEN 975 MILLIGRAM(S): 325 TABLET ORAL at 00:38

## 2024-08-14 RX ADMIN — APIXABAN 2.5 MILLIGRAM(S): 5 TABLET, FILM COATED ORAL at 06:36

## 2024-08-14 RX ADMIN — LOSARTAN POTASSIUM 25 MILLIGRAM(S): 50 TABLET ORAL at 06:35

## 2024-08-14 RX ADMIN — DIVALPROEX SODIUM 125 MILLIGRAM(S): 125 CAPSULE, DELAYED RELEASE ORAL at 06:59

## 2024-08-14 RX ADMIN — CEFEPIME 100 MILLIGRAM(S): 2 INJECTION, POWDER, FOR SOLUTION INTRAVENOUS at 00:38

## 2024-08-14 RX ADMIN — Medication 2 TABLET(S): at 22:07

## 2024-08-14 RX ADMIN — REMDESIVIR 200 MILLIGRAM(S): 5 INJECTION INTRAVENOUS at 11:44

## 2024-08-14 RX ADMIN — Medication 1700 MILLILITER(S): at 01:38

## 2024-08-14 RX ADMIN — LORAZEPAM 0.25 MILLIGRAM(S): 4 INJECTION INTRAMUSCULAR; INTRAVENOUS at 06:36

## 2024-08-14 RX ADMIN — METOPROLOL TARTRATE 25 MILLIGRAM(S): 100 TABLET ORAL at 22:07

## 2024-08-14 RX ADMIN — DIVALPROEX SODIUM 125 MILLIGRAM(S): 125 CAPSULE, DELAYED RELEASE ORAL at 22:07

## 2024-08-14 RX ADMIN — Medication 1700 MILLILITER(S): at 00:38

## 2024-08-14 RX ADMIN — METOPROLOL TARTRATE 5 MILLIGRAM(S): 100 TABLET ORAL at 03:23

## 2024-08-14 RX ADMIN — Medication 40 MILLIGRAM(S): at 07:00

## 2024-08-14 RX ADMIN — BUSPIRONE HYDROCHLORIDE 5 MILLIGRAM(S): 30 TABLET ORAL at 06:50

## 2024-08-14 RX ADMIN — Medication 5 MILLIGRAM(S): at 22:07

## 2024-08-14 NOTE — ED ADULT TRIAGE NOTE - CHIEF COMPLAINT QUOTE
Pt. BIBA from [facility] due to hypoxia and hypotension. As per EMS pt O2 was noted 88% on room air. Blood pressure 86/40

## 2024-08-14 NOTE — H&P ADULT - ASSESSMENT
89-year-old female past medical history CVA, A-fib on Eliquis, HTN, diabetes, dementia confused and minimally verbal at baseline, presents BIBEMS from [facility] for evaluation of fever at facility.      #COVID Pneumonia with acute hypoxic respiratory failure  - Chest xray  - currently on XX saturating XX%   - maintain oxygen saturation >94%  - remdesivir day x/5   - decadron day x/10   - monitor CBC with Diff, BMP, d-dimer, ferritin, LDH, CRP, ESR    # CVA,     # A-fib on Eliquis,     #HTN,     #Diabetes,     #Dementia     #Miscellaneous  - DVT  - GI  - Activity  - Diet  - Disposition   89-year-old female past medical history CVA, A-fib on Eliquis, HTN, diabetes, dementia confused and minimally verbal at baseline, presents BIBEMS from [place] for evaluation of fever at facility.      #COVID Pneumonia   - Not in acute hypoxic respiratory failure --> in ED adequate saturation on room air  - Chest xray: no clear infiltrate  - maintain oxygen saturation >94%  - Indication for remdesivir or decadron per ID   - monitor CBC with Diff  - Will order inflammatory panel for 11 am with ferritin, LDH, CRP, ESR    # CVA,   # Hx A-fib   - ECG on admission shows sinus tachycardia   - Afib maybe paroxysmal, on Eliquis 2.5 mg BID, on Metoprolol 25    #HTN  - c/w home dosage of Losartan 25 mgb    #Diabetes  - On Metformin 500 BID at [place]  - Based on UA, diabetes seems poorly controlled (urinary gluocse >= 1000)  - F/U HbA1c at 11    #Dementia   - Will obtain speech and swallow eval prior to resuming feeds    #Miscellaneous  - DVT: Apixaban  - GI: Pantoprazole  - Activity: as tolerated  - Diet: NPO until evaluated by speech and swallow  - Disposition: acute, admit to medicine

## 2024-08-14 NOTE — ED PROVIDER NOTE - CARE PLAN
1 Principal Discharge DX:	2019 novel coronavirus disease (COVID-19)  Secondary Diagnosis:	Hypoxia   Principal Discharge DX:	2019 novel coronavirus disease (COVID-19)  Secondary Diagnosis:	Hypoxia  Secondary Diagnosis:	Transient hypotension

## 2024-08-14 NOTE — ED PROVIDER NOTE - CRITICAL CARE ATTENDING CONTRIBUTION TO CARE
I personally saw the patient. MARIO Medrano and I provided critical care for a total of 35  minutes. I provided a substantive portion of the care and the majority of the critical care time.      89-year-old female with from [facility] with past medical history of A-fib (on Eliquis 2.5 mg), TIA, DM, HTN, HLD, CVA, anemia, polyneuropathy, PVD, GERD, bipolar, glaucoma, cholecystectomy presents to the ED for report of fever with an episode of hypoxia into the 80s and blood pressure that went down to 80s over 40s per EMS report.  Patient has dementia and unable to provide much history.  Patient apparently also has COVID.  Arrived in the ED with a blood pressure of systolic 93.  Sepsis protocol initiated, IV fluids, IV antibiotics (positive UTI), labs, x-ray, EKG all ordered.  Patient had improvement of her sats and her blood pressure.  Patient becoming more agitated and awake his blood pressure improving therefore benzo given to calm patient down. Patient also noted to have a positive UTI and antibiotics initiated.  Given her usual metoprolol as she also went into rapid A-fib.  And heart rate controlled well into the 90s after this.  To be admitted to the hospital for hypoxia secondary to her COVID.      ALL: naproxen  Meds:Eliquis 2.5 mg, metformin, vitamin D, BuSpar, lorazepam, Tylenol, melatonin, losartan, Neurontin, MVI, sucralfate, senna, and a statin, Depakote, Patanol, Aveeno, metoprolol, omeprazole  PMD Jason Ponce

## 2024-08-14 NOTE — ED PROVIDER NOTE - PHYSICAL EXAMINATION
Vital Signs: I have reviewed the initial vital signs.  CONSTITUTIONAL: Pt in no acute distress   SKIN: Skin exam is warm and dry, no acute rash.  HEAD: Normocephalic; atraumatic.  EYES: PERRL, EOM intact; conjunctiva and sclera clear.  ENT: No nasal discharge; airway clear.   NECK: Supple;   CARD: S1, S2 normal; no murmurs, gallops, or rubs. Regular rate and rhythm.  RESP: +coarse breath sounds b/l  ABD: soft; non-distended; non-tender; no hepatosplenomegaly.  MSK: ROM at baseline. no edema. No bony tenderness.   NEURO: awake, alert. Mental status at baseline. Limited secondary to medical history

## 2024-08-14 NOTE — ED PROVIDER NOTE - CLINICAL SUMMARY MEDICAL DECISION MAKING FREE TEXT BOX
I personally saw the patient. MARIO Medrano and I provided critical care for a total of 35  minutes. I provided a substantive portion of the care and the majority of the critical care time.      89-year-old female with from [facility] with past medical history of A-fib (on Eliquis 2.5 mg), TIA, DM, HTN, HLD, CVA, anemia, polyneuropathy, PVD, GERD, bipolar, glaucoma, cholecystectomy presents to the ED for report of fever with an episode of hypoxia into the 80s and blood pressure that went down to 80s over 40s per EMS report.  Patient has dementia and unable to provide much history.  Patient apparently also has COVID.  Arrived in the ED with a blood pressure of systolic 93.  Sepsis protocol initiated, IV fluids, IV antibiotics (positive UTI), labs, x-ray, EKG all ordered.  Patient had improvement of her sats and her blood pressure.  Patient becoming more agitated and awake his blood pressure improving therefore benzo given to calm patient down. Patient also noted to have a positive UTI and antibiotics initiated.  Given her usual metoprolol as she also went into rapid A-fib.  And heart rate controlled well into the 90s after this.  To be admitted to the hospital for hypoxia secondary to her COVID.

## 2024-08-14 NOTE — H&P ADULT - NSHPPHYSICALEXAM_GEN_ALL_CORE
Vital Signs: I have reviewed the initial vital signs.  	CONSTITUTIONAL: Pt in no acute distress   	SKIN: Skin exam is warm and dry, no acute rash.  	HEAD: Normocephalic; atraumatic.  	EYES: PERRL, EOM intact; conjunctiva and sclera clear.  	ENT: No nasal discharge; airway clear.   	NECK: Supple;   	CARD: S1, S2 normal; no murmurs, gallops, or rubs. Regular rate and rhythm.  	RESP: +coarse breath sounds b/l  	ABD: soft; non-distended; non-tender; no hepatosplenomegaly.  	MSK: ROM at baseline. no edema. No bony tenderness.   NEURO: awake, alert. Mental status at baseline. Limited secondary to medical history Vital Signs: I have reviewed the initial vital signs.  CONSTITUTIONAL: Pt in no acute distress   SKIN: Skin exam is warm and dry, no acute rash.  HEAD: Normocephalic; atraumatic.  ENT: No nasal discharge; airway clear.    CARD: S1, S2 normal; no murmurs, gallops, or rubs. Regular rate and rhythm.  RESP: +coarse breath sounds b/l  ABD: soft; non-distended; non-tender; no hepatosplenomegaly..   NEURO: awake, alert. Mental status at baseline. Limited secondary to medical history

## 2024-08-14 NOTE — CONSULT NOTE ADULT - NS ATTEST RISK PROBLEM GEN_ALL_CORE FT
- I independently interpreted the most recent CXR- no PNA    - I discussed my recommendations with the primary team housestaff / Attending

## 2024-08-14 NOTE — ED PROVIDER NOTE - PROGRESS NOTE DETAILS
patient takes ativan 0.5 at facility. patient is more alert and awake now and wants to pull out her IVs. will give IV ativan to calm patient. also noted rapid a.fib to 130s while waiting for labs result. patient take metoprolol 25mg. gives 2 doses of IV lopressor 5mg and HR controlled to 90s.

## 2024-08-14 NOTE — H&P ADULT - ATTENDING COMMENTS
Chart reviewed, patient examined. Pertinent results reviewed.  Case discussed with HO; specialist f/u reviewed  HD#2;     Assessment, plan, testing and vital signs noted  Patient sent in from SNF which she is a long-term resident because of fever and hypoxia.  ED evaluation revealed positive COVID-19 by rapid testing.  She had mild hypoxia corrected by oxygen but chest x-ray had no gross opacities.    Vital Signs Last 24 Hrs  T(C): 36.4 (14 Aug 2024 16:58), Max: 39.1 (13 Aug 2024 23:24)  T(F): 97.6 (14 Aug 2024 16:58), Max: 102.3 (13 Aug 2024 23:24)  HR: 102 (14 Aug 2024 16:58) (86 - 134)  BP: 116/76 (14 Aug 2024 16:58) (93/61 - 142/81)  BP(mean): 87 (14 Aug 2024 03:15) (73 - 101)  RR: 19 (14 Aug 2024 16:58) (19 - 20)  SpO2: 97% (14 Aug 2024 16:58) (96% - 100%)    Parameters below as of 14 Aug 2024 16:58  Patient On (Oxygen Delivery Method): room air    PE:    PHYSICAL EXAM:  Gen: chronically ill appearing on RA; Confused, Ox1; + dysarthria  HEENT: Normocephalic, atraumatic; Th: MMM  Neck: supple, no lymphadenopathy  CV: RRR, no MRG  Lungs: Shallow respirations but easy, decreased BS at bases,   Abdomen: Soft, BS present  Ext: Warm, well perfused  Neuro: Moderate general weakness with left hemiparesis,   Skin: no rash, no erythema  Lines: no phlebitis     Labs and testing as noted    Impression and plan:  See ID evaluation.  Older woman with COVID-19; poor overall status and long-term residence in SNF [place] patient has immunodeficient.  They recommend starting remdesivir for 3 days IV therapy.  With acute respiratory failure oxygen as needed and monitoring.  Older woman with multiple problems will continue to discuss end-of-life goals of care with family.

## 2024-08-14 NOTE — H&P ADULT - NSHPLABSRESULTS_GEN_ALL_CORE
11.9   7.94  )-----------( 263      ( 13 Aug 2024 23:55 )             36.7     08-13    139  |  105  |  28<H>  ----------------------------<  187<H>  4.4   |  22  |  1.0    Ca    8.9      13 Aug 2024 23:55    TPro  6.3  /  Alb  3.6  /  TBili  <0.2  /  DBili  x   /  AST  21  /  ALT  7   /  AlkPhos  58  08-13

## 2024-08-14 NOTE — H&P ADULT - HISTORY OF PRESENT ILLNESS
89-year-old female past medical history CVA, A-fib on Eliquis, HTN, diabetes, dementia confused and minimally verbal at baseline, presents BIBEMS from [place] for evaluation of fever at facility.      Per EMS patient found to have 102 fever earlier today at home with cough and shortness of breath so EMS was called.  EMS found patient hypoxic to 80s with BP 80s/40s and brought to ED for evaluation.     Patient minimally contributory to history secondary to dementia.    In the ED  - Vitals  - Labs  - Imaging 89-year-old female past medical history CVA, A-fib on Eliquis, HTN, diabetes, dementia confused and minimally verbal at baseline, presents BIBEMS from [place] for evaluation of fever at facility.      Per EMS patient found to have 102 fever earlier today at home with cough and shortness of breath so EMS was called.    EMS found patient hypoxic to 80s with BP 80s/40s and brought to ED for evaluation.     Patient minimally contributory to history secondary to dementia. When examined by me in the ED, patient was on room air, bad waveform on finger oximetry but did not appear dyspneic or in distress    In the ED  - Vitals: T 102.3  BP 93/61 SpO2 98% and RR 20  - Labs: WBC 7.94 Hb 11.9 Cr 1   - Imaging: No identifiable opacities in either lung fields (my read)  - RVP (+) for COVID    Patient will be admitted to the hospital for mild covid pneumonia

## 2024-08-14 NOTE — PATIENT PROFILE ADULT - NSPROGENPREVTRANSF_GEN_A_NUR
Problem: Mobility Impaired (Adult and Pediatric)  Goal: *Acute Goals and Plan of Care (Insert Text)  Description: Physical Therapy Goals  Initiated 2/3/2022 and to be accomplished within 7 day(s)  1. Patient will move from supine to sit and sit to supine , scoot up and down, and roll side to side in bed with modified independence. 2.  Patient will transfer from bed to chair and chair to bed with modified independence using the least restrictive device. 3.  Patient will perform sit to stand with modified independence. 4.  Patient will ambulate with modified independence for 50 feet with the least restrictive device. 5.  Assess stairs as needed or appropriate for discharge. PLOF: Pt reporting he is homeless, stays on streets or in friend's shed. Has a SPC. Reports he has had increased SOB and decereased endurance over the last several months. Outcome: Progressing Towards Goal     PHYSICAL THERAPY EVALUATION    Patient: Jose Wooten (55 y.o. male)  Date: 2/3/2022  Primary Diagnosis: Pneumonia [J18.9]        Precautions:   (droplet plus )    ASSESSMENT :  Pt cleared to participate in PT session, pt received semi-reclined in bed and agreeable to therapy session. Completing with OT to maximize safety and mobility. Based on the objective data described below, the patient presents with decreased endurance, decreased strength, decreased balance reactions, gait deviations,  and decreased independence in functional mobility. Pt completing supine to sit with HOB elevated and supervision. Pt sitting on EOB with good sitting balance, demonstrating 4-/5 gross LLE strength. Pt standing with CGA, standing with increased hip hinge for extended period of time. Pt standing and ambulating with poor clearance and shuffled gait pattern. Pt given handhold for support. Ambulating in room with Ar x15 feet. Pt set up in recliner.  Pt positioned for comfort and educated to call for assist before getting up, pt verbalized understanding. Pt left with all needs met and call bell in reach. RN notified of position and participation. Patient will benefit from skilled intervention to address the above impairments. Patient's rehabilitation potential is considered to be Fair  Factors which may influence rehabilitation potential include:   []         None noted  []         Mental ability/status  [x]         Medical condition  [x]         Home/family situation and support systems  []         Safety awareness  []         Pain tolerance/management  []         Other:      PLAN :  Recommendations and Planned Interventions:   [x]           Bed Mobility Training             []    Neuromuscular Re-Education  [x]           Transfer Training                   []    Orthotic/Prosthetic Training  [x]           Gait Training                          []    Modalities  [x]           Therapeutic Exercises           []    Edema Management/Control  [x]           Therapeutic Activities            [x]    Family Training/Education  [x]           Patient Education  []           Other (comment):    Frequency/Duration: Patient will be followed by physical therapy 1-2 times per day/2-5 days per week to address goals. Discharge Recommendations: Rehab vs Group Home/LTC? Further Equipment Recommendations for Discharge: rolling walker     SUBJECTIVE:   Patient stated I can't walk very far.     OBJECTIVE DATA SUMMARY:     Past Medical History:   Diagnosis Date    Hep B w/o coma     Hep C w/o coma, chronic (HCC)     Heroin abuse    No past surgical history on file.   Barriers to Learning/Limitations: yes;  physical  Compensate with: Visual Cues, Verbal Cues, and Tactile Cues  Home Situation:  Home Situation  Home Environment: Other (comment) (homeless)  One/Two Story Residence: Other (Comment)  Living Alone: Yes  Support Systems: Other (Comment) (None)  Patient Expects to be Discharged to[de-identified] Other: (unknown )  Current DME Used/Available at Home: [address] [facility] [place], [place]  Critical Behavior:  Neurologic State: Alert  Orientation Level: Oriented X4  Cognition: Follows commands  Safety/Judgement: Fall prevention  Psychosocial  Patient Behaviors: Calm  Purposeful Interaction: Yes  Pt Identified Daily Priority: Clinical issues (comment)  Car[employer] Process: Nurture loving kindness;Establish trust;Teaching/learning; Attend basic human needs  Caring Interventions: Reassure; Therapeutic modalities  Reassure: Therapeutic listening; Informing;Caring rounds  Skin Condition/Temp: Warm;Dry     Skin Integrity: Wound (add Wound LDA)  Skin Integumentary  Skin Color: Appropriate for ethnicity  Skin Condition/Temp: Warm;Dry  Skin Integrity: Wound (add Wound LDA)  Turgor: Non-tenting  Hair Growth: Present  Varicosities: Absent  Nails: Exceptions to WDL  Exceptions to WDL: Thick     Strength:    Strength: Generally decreased, functional    Tone & Sensation:   Tone: Normal    Sensation: Impaired    Range Of Motion:  AROM: Within functional limits    Posture:  Posture (WDL): Exceptions to WDL  Posture Assessment: Forward head;Trunk flexion  Functional Mobility:  Bed Mobility:     Supine to Sit: Supervision     Scooting: Supervision  Transfers:  Sit to Stand: Contact guard assistance  Stand to Sit: Contact guard assistance    Balance:   Sitting: Intact  Standing: Impaired; With support; Without support  Standing - Static: Fair  Standing - Dynamic : Fair    Ambulation/Gait Training:  Distance (ft): 15 Feet (ft)  Assistive Device:  (handhold )  Ambulation - Level of Assistance: Minimal assistance     Gait Description (WDL): Exceptions to WDL  Gait Abnormalities: Decreased step clearance    Base of Support: Center of gravity altered;Narrowed     Speed/Bridgett: Slow;Shuffled  Step Length: Right shortened;Left shortened    Pain:  Pain level pre-treatment: 0/10   Pain level post-treatment: 0/10     Activity Tolerance:   Fair activity tolerance     Please refer to the flowsheet for vital signs taken during this treatment. After treatment:   [x]         Patient left in no apparent distress sitting up in chair  []         Patient left in no apparent distress in bed  [x]         Call bell left within reach  [x]         Nursing notified  []         Caregiver present  []         Bed alarm activated  []         SCDs applied    COMMUNICATION/EDUCATION:   [x]         Role of Physical Therapy in the acute care setting. [x]         Fall prevention education was provided and the patient/caregiver indicated understanding. [x]         Patient/family have participated as able in goal setting and plan of care. [x]         Patient/family agree to work toward stated goals and plan of care. []         Patient understands intent and goals of therapy, but is neutral about his/her participation. []         Patient is unable to participate in goal setting/plan of care: ongoing with therapy staff.  []         Other:     Thank you for this referral.  Fabrice Taylor, PT   Time Calculation: 28 mins      Eval Complexity: History: MEDIUM  Complexity : 1-2 comorbidities / personal factors will impact the outcome/ POC Exam:LOW Complexity : 1-2 Standardized tests and measures addressing body structure, function, activity limitation and / or participation in recreation  Presentation: LOW Complexity : Stable, uncomplicated  Clinical Decision Making:Low Complexity low  Overall Complexity:LOW no history of blood product transfusion

## 2024-08-14 NOTE — CONSULT NOTE ADULT - SUBJECTIVE AND OBJECTIVE BOX
JAMES WOODWARD  89y, Female  Allergy: naproxen (Unknown)  Aleve (Unknown)      CHIEF COMPLAINT:   Hypoxia (14 Aug 2024 05:13)      LOS      HPI  HPI:  89-year-old female past medical history CVA, A-fib on Eliquis, HTN, diabetes, dementia confused and minimally verbal at baseline, presents BIBEMS from [place] for evaluation of fever at facility.      Per EMS patient found to have 102 fever earlier today at home with cough and shortness of breath so EMS was called.    EMS found patient hypoxic to 80s with BP 80s/40s and brought to ED for evaluation.     Patient minimally contributory to history secondary to dementia. When examined by me in the ED, patient was on room air, bad waveform on finger oximetry but did not appear dyspneic or in distress    In the ED  - Vitals: T 102.3  BP 93/61 SpO2 98% and RR 20  - Labs: WBC 7.94 Hb 11.9 Cr 1   - Imaging: No identifiable opacities in either lung fields (my read)  - RVP (+) for COVID    Patient will be admitted to the hospital for mild covid pneumonia (14 Aug 2024 05:13)      INFECTIOUS DISEASE HISTORY:  ID consulted for COVID19  hypoxemia on admission    Currently ordered for:  remdesivir  IVPB 200 milliGRAM(s) IV Intermittent every 24 hours  remdesivir  IVPB   IV Intermittent       PMH  PAST MEDICAL & SURGICAL HISTORY:  Diabetes      Hypertension      Atrial fibrillation      Cerebrovascular accident (CVA)          FAMILY HISTORY  non-contributory     SOCIAL HISTORY  Social History:        ROS  ***    VITALS:  T(F): 98.2, Max: 102.3 (08-13-24 @ 23:24)  HR: 86  BP: 110/70  RR: 20Vital Signs Last 24 Hrs  T(C): 36.8 (14 Aug 2024 07:47), Max: 39.1 (13 Aug 2024 23:24)  T(F): 98.2 (14 Aug 2024 07:47), Max: 102.3 (13 Aug 2024 23:24)  HR: 86 (14 Aug 2024 07:47) (86 - 134)  BP: 110/70 (14 Aug 2024 07:47) (93/61 - 142/81)  BP(mean): 87 (14 Aug 2024 03:15) (73 - 101)  RR: 20 (14 Aug 2024 07:47) (20 - 20)  SpO2: 100% (14 Aug 2024 07:47) (96% - 100%)    Parameters below as of 14 Aug 2024 07:47  Patient On (Oxygen Delivery Method): nasal cannula  O2 Flow (L/min): 3      PHYSICAL EXAM:  ***    TESTS & MEASUREMENTS:                        12.1   8.83  )-----------( 215      ( 14 Aug 2024 05:10 )             38.5     08-14    139  |  103  |  24<H>  ----------------------------<  142<H>  4.7   |  25  |  0.8    Ca    8.8      14 Aug 2024 05:10    TPro  5.9<L>  /  Alb  3.6  /  TBili  <0.2  /  DBili  x   /  AST  15  /  ALT  6   /  AlkPhos  55  08-14      LIVER FUNCTIONS - ( 14 Aug 2024 05:10 )  Alb: 3.6 g/dL / Pro: 5.9 g/dL / ALK PHOS: 55 U/L / ALT: 6 U/L / AST: 15 U/L / GGT: x           Urinalysis Basic - ( 14 Aug 2024 05:10 )    Color: x / Appearance: x / SG: x / pH: x  Gluc: 142 mg/dL / Ketone: x  / Bili: x / Urobili: x   Blood: x / Protein: x / Nitrite: x   Leuk Esterase: x / RBC: x / WBC x   Sq Epi: x / Non Sq Epi: x / Bacteria: x        Culture - Blood (collected 10-26-23 @ 00:00)  Source: .Blood Blood-Venous  Final Report (10-31-23 @ 09:01):    No growth at 5 days    Culture - Urine (collected 10-25-23 @ 23:15)  Source: Catheterized Catheterized  Final Report (10-29-23 @ 09:37):    >100,000 CFU/ml Klebsiella pneumoniae    >100,000 CFU/ml Proteus mirabilis  Organism: Klebsiella pneumoniae  Proteus mirabilis (10-29-23 @ 09:37)  Organism: Proteus mirabilis (10-29-23 @ 09:37)      Method Type: YESENIA      -  Amikacin: S <=16      -  Amoxicillin/Clavulanic Acid: S <=8/4      -  Ampicillin: I 16 These ampicillin results predict results for amoxicillin      -  Ampicillin/Sulbactam: S <=4/2      -  Aztreonam: S <=4      -  Cefazolin: S <=2 For uncomplicated UTI with K. pneumoniae, E. coli, or P. mirablis: YESENIA <=16 is sensitive and YESENIA >=32 is resistant. This also predicts results for oral agents cefaclor, cefdinir, cefpodoxime, cefprozil, cefuroxime axetil, cephalexin and locarbef for uncomplicated UTI. Note that some isolates may be susceptible to these agents while testing resistant to cefazolin.      -  Cefepime: S <=2      -  Cefoxitin: S <=8      -  Ceftriaxone: S <=1      -  Cefuroxime: S <=4      -  Ciprofloxacin: R >2      -  Ertapenem: S <=0.5      -  Gentamicin: S <=2      -  Levofloxacin: R >4      -  Meropenem: S <=1      -  Nitrofurantoin: R 64 Should not be used to treat pyelonephritis      -  Piperacillin/Tazobactam: S <=8      -  Tobramycin: S <=2      -  Trimethoprim/Sulfamethoxazole: S <=0.5/9.5  Organism: Klebsiella pneumoniae (10-29-23 @ 09:37)      Method Type: YESENIA      -  Amikacin: S <=16      -  Amoxicillin/Clavulanic Acid: S <=8/4      -  Ampicillin: R 16 These ampicillin results predict results for amoxicillin      -  Ampicillin/Sulbactam: S <=4/2      -  Aztreonam: S <=4      -  Cefazolin: S <=2 For uncomplicated UTI with K. pneumoniae, E. coli, or P. mirablis: YESENIA <=16 is sensitive and YESENIA >=32 is resistant. This also predicts results for oral agents cefaclor, cefdinir, cefpodoxime, cefprozil, cefuroxime axetil, cephalexin and locarbef for uncomplicated UTI. Note that some isolates may be susceptible to these agents while testing resistant to cefazolin.      -  Cefepime: S <=2      -  Cefoxitin: S <=8      -  Ceftriaxone: S <=1      -  Cefuroxime: S <=4      -  Ciprofloxacin: S <=0.25      -  Ertapenem: S <=0.5      -  Gentamicin: S <=2      -  Imipenem: S <=1      -  Levofloxacin: S <=0.5      -  Meropenem: S <=1      -  Nitrofurantoin: S <=32 Should not be used to treat pyelonephritis      -  Piperacillin/Tazobactam: S <=8      -  Tobramycin: S <=2      -  Trimethoprim/Sulfamethoxazole: S <=0.5/9.5        Lactate, Blood: 2.0 mmol/L (08-14-24 @ 05:10)  Blood Gas Venous - Lactate: 2.1 mmol/L (08-13-24 @ 23:35)      INFECTIOUS DISEASES TESTING  MRSA PCR Result.: Negative (10-25-23 @ 23:15)      INFLAMMATORY MARKERS      RADIOLOGY & ADDITIONAL TESTS:  I have personally reviewed the last Chest xray  CXR      CT      CARDIOLOGY TESTING  12 Lead ECG:   Ventricular Rate 112 BPM    Atrial Rate 112 BPM    P-R Interval 96 m <TRUNCATED> (08-14-24 @ 00:07)       MEDICATIONS  apixaban 2.5 Oral every 12 hours  busPIRone 5 Oral two times a day  dextrose 5%. 1000 IV Continuous <Continuous>  divalproex  Oral two times a day  gabapentin 300 Oral daily  insulin glargine Injectable (LANTUS) 10 SubCutaneous at bedtime  insulin lispro (ADMELOG) corrective regimen sliding scale  SubCutaneous three times a day before meals  insulin lispro Injectable (ADMELOG) 3 SubCutaneous three times a day before meals  LORazepam     Tablet 0.25 Oral two times a day  losartan 25 Oral daily  melatonin 5 Oral at bedtime  metoprolol tartrate 25 Oral two times a day  multivitamin 1 Oral daily  pantoprazole    Tablet 40 Oral before breakfast  remdesivir  IVPB 200 IV Intermittent every 24 hours  remdesivir  IVPB  IV Intermittent   senna 2 Oral at bedtime  sucralfate 1 Oral two times a day      ANTIBIOTICS:  remdesivir  IVPB 200 milliGRAM(s) IV Intermittent every 24 hours  remdesivir  IVPB   IV Intermittent       ALLERGIES:  naproxen (Unknown)  Aleve (Unknown)           JAMES WOODWARD  89y, Female  Allergy: naproxen (Unknown)  Aleve (Unknown)      CHIEF COMPLAINT:   Hypoxia (14 Aug 2024 05:13)      LOS      HPI  HPI:  89-year-old female past medical history CVA, A-fib on Eliquis, HTN, diabetes, dementia confused and minimally verbal at baseline, presents BIBEMS from [place] for evaluation of fever at facility.      Per EMS patient found to have 102 fever earlier today at home with cough and shortness of breath so EMS was called.    EMS found patient hypoxic to 80s with BP 80s/40s and brought to ED for evaluation.     Patient minimally contributory to history secondary to dementia. When examined by me in the ED, patient was on room air, bad waveform on finger oximetry but did not appear dyspneic or in distress    In the ED  - Vitals: T 102.3  BP 93/61 SpO2 98% and RR 20  - Labs: WBC 7.94 Hb 11.9 Cr 1   - Imaging: No identifiable opacities in either lung fields (my read)  - RVP (+) for COVID    Patient will be admitted to the hospital for mild covid pneumonia (14 Aug 2024 05:13)      INFECTIOUS DISEASE HISTORY:  ID consulted for COVID19  hypoxemia on admission  cannot obtain further history from the patient secondary to altered mental status or sedation    Currently ordered for:  remdesivir  IVPB 200 milliGRAM(s) IV Intermittent every 24 hours  remdesivir  IVPB   IV Intermittent       PMH  PAST MEDICAL & SURGICAL HISTORY:  Diabetes      Hypertension      Atrial fibrillation      Cerebrovascular accident (CVA)          FAMILY HISTORY  non-contributory     SOCIAL HISTORY  Social History:        ROS  unable to obtain history secondary to patient's mental status and/or sedation     VITALS:  T(F): 98.2, Max: 102.3 (08-13-24 @ 23:24)  HR: 86  BP: 110/70  RR: 20Vital Signs Last 24 Hrs  T(C): 36.8 (14 Aug 2024 07:47), Max: 39.1 (13 Aug 2024 23:24)  T(F): 98.2 (14 Aug 2024 07:47), Max: 102.3 (13 Aug 2024 23:24)  HR: 86 (14 Aug 2024 07:47) (86 - 134)  BP: 110/70 (14 Aug 2024 07:47) (93/61 - 142/81)  BP(mean): 87 (14 Aug 2024 03:15) (73 - 101)  RR: 20 (14 Aug 2024 07:47) (20 - 20)  SpO2: 100% (14 Aug 2024 07:47) (96% - 100%)    Parameters below as of 14 Aug 2024 07:47  Patient On (Oxygen Delivery Method): nasal cannula  O2 Flow (L/min): 3      PHYSICAL EXAM:  Gen: chronically ill appearing on RA  HEENT: Normocephalic, atraumatic  Neck: supple, no lymphadenopathy  CV: Regular rate & regular rhythm  Lungs: decreased BS at bases, no fremitus  Abdomen: Soft, BS present  Ext: Warm, well perfused  Neuro: non focal,   Skin: no rash, no erythema  Lines: no phlebitis     TESTS & MEASUREMENTS:                        12.1   8.83  )-----------( 215      ( 14 Aug 2024 05:10 )             38.5     08-14    139  |  103  |  24<H>  ----------------------------<  142<H>  4.7   |  25  |  0.8    Ca    8.8      14 Aug 2024 05:10    TPro  5.9<L>  /  Alb  3.6  /  TBili  <0.2  /  DBili  x   /  AST  15  /  ALT  6   /  AlkPhos  55  08-14      LIVER FUNCTIONS - ( 14 Aug 2024 05:10 )  Alb: 3.6 g/dL / Pro: 5.9 g/dL / ALK PHOS: 55 U/L / ALT: 6 U/L / AST: 15 U/L / GGT: x           Urinalysis Basic - ( 14 Aug 2024 05:10 )    Color: x / Appearance: x / SG: x / pH: x  Gluc: 142 mg/dL / Ketone: x  / Bili: x / Urobili: x   Blood: x / Protein: x / Nitrite: x   Leuk Esterase: x / RBC: x / WBC x   Sq Epi: x / Non Sq Epi: x / Bacteria: x        Culture - Blood (collected 10-26-23 @ 00:00)  Source: .Blood Blood-Venous  Final Report (10-31-23 @ 09:01):    No growth at 5 days    Culture - Urine (collected 10-25-23 @ 23:15)  Source: Catheterized Catheterized  Final Report (10-29-23 @ 09:37):    >100,000 CFU/ml Klebsiella pneumoniae    >100,000 CFU/ml Proteus mirabilis  Organism: Klebsiella pneumoniae  Proteus mirabilis (10-29-23 @ 09:37)  Organism: Proteus mirabilis (10-29-23 @ 09:37)      Method Type: YESENIA      -  Amikacin: S <=16      -  Amoxicillin/Clavulanic Acid: S <=8/4      -  Ampicillin: I 16 These ampicillin results predict results for amoxicillin      -  Ampicillin/Sulbactam: S <=4/2      -  Aztreonam: S <=4      -  Cefazolin: S <=2 For uncomplicated UTI with K. pneumoniae, E. coli, or P. mirablis: YESENIA <=16 is sensitive and YESENIA >=32 is resistant. This also predicts results for oral agents cefaclor, cefdinir, cefpodoxime, cefprozil, cefuroxime axetil, cephalexin and locarbef for uncomplicated UTI. Note that some isolates may be susceptible to these agents while testing resistant to cefazolin.      -  Cefepime: S <=2      -  Cefoxitin: S <=8      -  Ceftriaxone: S <=1      -  Cefuroxime: S <=4      -  Ciprofloxacin: R >2      -  Ertapenem: S <=0.5      -  Gentamicin: S <=2      -  Levofloxacin: R >4      -  Meropenem: S <=1      -  Nitrofurantoin: R 64 Should not be used to treat pyelonephritis      -  Piperacillin/Tazobactam: S <=8      -  Tobramycin: S <=2      -  Trimethoprim/Sulfamethoxazole: S <=0.5/9.5  Organism: Klebsiella pneumoniae (10-29-23 @ 09:37)      Method Type: YESENIA      -  Amikacin: S <=16      -  Amoxicillin/Clavulanic Acid: S <=8/4      -  Ampicillin: R 16 These ampicillin results predict results for amoxicillin      -  Ampicillin/Sulbactam: S <=4/2      -  Aztreonam: S <=4      -  Cefazolin: S <=2 For uncomplicated UTI with K. pneumoniae, E. coli, or P. mirablis: YESENIA <=16 is sensitive and YESENIA >=32 is resistant. This also predicts results for oral agents cefaclor, cefdinir, cefpodoxime, cefprozil, cefuroxime axetil, cephalexin and locarbef for uncomplicated UTI. Note that some isolates may be susceptible to these agents while testing resistant to cefazolin.      -  Cefepime: S <=2      -  Cefoxitin: S <=8      -  Ceftriaxone: S <=1      -  Cefuroxime: S <=4      -  Ciprofloxacin: S <=0.25      -  Ertapenem: S <=0.5      -  Gentamicin: S <=2      -  Imipenem: S <=1      -  Levofloxacin: S <=0.5      -  Meropenem: S <=1      -  Nitrofurantoin: S <=32 Should not be used to treat pyelonephritis      -  Piperacillin/Tazobactam: S <=8      -  Tobramycin: S <=2      -  Trimethoprim/Sulfamethoxazole: S <=0.5/9.5        Lactate, Blood: 2.0 mmol/L (08-14-24 @ 05:10)  Blood Gas Venous - Lactate: 2.1 mmol/L (08-13-24 @ 23:35)      INFECTIOUS DISEASES TESTING  MRSA PCR Result.: Negative (10-25-23 @ 23:15)      INFLAMMATORY MARKERS      RADIOLOGY & ADDITIONAL TESTS:  I have personally reviewed the last Chest xray  CXR      CT      CARDIOLOGY TESTING  12 Lead ECG:   Ventricular Rate 112 BPM    Atrial Rate 112 BPM    P-R Interval 96 m <TRUNCATED> (08-14-24 @ 00:07)       MEDICATIONS  apixaban 2.5 Oral every 12 hours  busPIRone 5 Oral two times a day  dextrose 5%. 1000 IV Continuous <Continuous>  divalproex  Oral two times a day  gabapentin 300 Oral daily  insulin glargine Injectable (LANTUS) 10 SubCutaneous at bedtime  insulin lispro (ADMELOG) corrective regimen sliding scale  SubCutaneous three times a day before meals  insulin lispro Injectable (ADMELOG) 3 SubCutaneous three times a day before meals  LORazepam     Tablet 0.25 Oral two times a day  losartan 25 Oral daily  melatonin 5 Oral at bedtime  metoprolol tartrate 25 Oral two times a day  multivitamin 1 Oral daily  pantoprazole    Tablet 40 Oral before breakfast  remdesivir  IVPB 200 IV Intermittent every 24 hours  remdesivir  IVPB  IV Intermittent   senna 2 Oral at bedtime  sucralfate 1 Oral two times a day      ANTIBIOTICS:  remdesivir  IVPB 200 milliGRAM(s) IV Intermittent every 24 hours  remdesivir  IVPB   IV Intermittent       ALLERGIES:  naproxen (Unknown)  Aleve (Unknown)

## 2024-08-14 NOTE — PATIENT PROFILE ADULT - FALL HARM RISK - HARM RISK INTERVENTIONS

## 2024-08-14 NOTE — CONSULT NOTE ADULT - ASSESSMENT
ASSESSMENT  89-year-old female past medical history CVA, A-fib on Eliquis, HTN, diabetes, dementia confused and minimally verbal at baseline, presents BIBEMS from [place] for evaluation of fever at facility.      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 All final recommendations to follow after interview and examination of the patient. Please follow recommendations noted below.  - RDV    If any questions, please send a message or call on [employer] Teams  Please continue to update ID with any pertinent new laboratory, radiographic findings, or change in clinical status ASSESSMENT  89-year-old female past medical history CVA, A-fib on Eliquis, HTN, diabetes, dementia confused and minimally verbal at baseline, presents BIBEMS from [place] for evaluation of fever at facility.      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 Please inform ID of any patient clinical change or any new pertinent laboratory or radiographic data     If any questions, please send a message or call on [facility] Teams  Please continue to update ID with any pertinent new laboratory, radiographic findings, or change in clinical status

## 2024-08-14 NOTE — H&P ADULT - TIME BILLING
spending  50 minutes on this patient's case:   13   minutes w patient, 18   minutes reviewing the chart,    and  19   minutes speaking to the HO, RN, & ID; also coordinating care and documenting all.  This is my first day on this new case and the patient is new to me.

## 2024-08-14 NOTE — ED PROVIDER NOTE - OBJECTIVE STATEMENT
89-year-old female past medical history CVA, A-fib on Eliquis, HTN, diabetes, dementia confused and minimally verbal at baseline, presents BIBEMS from [place] for evaluation of fever at facility.  Per EMS patient found to have 102 fever earlier today at home with cough and shortness of breath so EMS was called.  Patient minimally contributory to history secondary to dementia. 89-year-old female past medical history CVA, A-fib on Eliquis, HTN, diabetes, dementia confused and minimally verbal at baseline, presents BIBEMS from [place] for evaluation of fever at facility.  Per EMS patient found to have 102 fever earlier today at home with cough and shortness of breath so EMS was called.  EMS found patient hypoxic to 80s with BP 80s/40s and brought to ED for evaluation. Patient minimally contributory to history secondary to dementia.

## 2024-08-15 LAB
A1C WITH ESTIMATED AVERAGE GLUCOSE RESULT: 7.2 % — HIGH (ref 4–5.6)
ALBUMIN SERPL ELPH-MCNC: 3.6 G/DL — SIGNIFICANT CHANGE UP (ref 3.5–5.2)
ALP SERPL-CCNC: 63 U/L — SIGNIFICANT CHANGE UP (ref 30–115)
ALT FLD-CCNC: 7 U/L — SIGNIFICANT CHANGE UP (ref 0–41)
ANION GAP SERPL CALC-SCNC: 17 MMOL/L — HIGH (ref 7–14)
AST SERPL-CCNC: 17 U/L — SIGNIFICANT CHANGE UP (ref 0–41)
BASOPHILS # BLD AUTO: 0.11 K/UL — SIGNIFICANT CHANGE UP (ref 0–0.2)
BASOPHILS NFR BLD AUTO: 1.7 % — HIGH (ref 0–1)
BILIRUB DIRECT SERPL-MCNC: <0.2 MG/DL — SIGNIFICANT CHANGE UP (ref 0–0.3)
BILIRUB INDIRECT FLD-MCNC: >0 MG/DL — LOW (ref 0.2–1.2)
BILIRUB SERPL-MCNC: 0.2 MG/DL — SIGNIFICANT CHANGE UP (ref 0.2–1.2)
BUN SERPL-MCNC: 15 MG/DL — SIGNIFICANT CHANGE UP (ref 10–20)
CALCIUM SERPL-MCNC: 8.8 MG/DL — SIGNIFICANT CHANGE UP (ref 8.4–10.4)
CHLORIDE SERPL-SCNC: 100 MMOL/L — SIGNIFICANT CHANGE UP (ref 98–110)
CO2 SERPL-SCNC: 24 MMOL/L — SIGNIFICANT CHANGE UP (ref 17–32)
CREAT SERPL-MCNC: 0.7 MG/DL — SIGNIFICANT CHANGE UP (ref 0.7–1.5)
EGFR: 84 ML/MIN/1.73M2 — SIGNIFICANT CHANGE UP
EOSINOPHIL # BLD AUTO: 0 K/UL — SIGNIFICANT CHANGE UP (ref 0–0.7)
EOSINOPHIL NFR BLD AUTO: 0 % — SIGNIFICANT CHANGE UP (ref 0–8)
ESTIMATED AVERAGE GLUCOSE: 160 MG/DL — HIGH (ref 68–114)
GLUCOSE BLDC GLUCOMTR-MCNC: 102 MG/DL — HIGH (ref 70–99)
GLUCOSE BLDC GLUCOMTR-MCNC: 115 MG/DL — HIGH (ref 70–99)
GLUCOSE BLDC GLUCOMTR-MCNC: 96 MG/DL — SIGNIFICANT CHANGE UP (ref 70–99)
GLUCOSE SERPL-MCNC: 116 MG/DL — HIGH (ref 70–99)
HCT VFR BLD CALC: 40 % — SIGNIFICANT CHANGE UP (ref 37–47)
HGB BLD-MCNC: 12.6 G/DL — SIGNIFICANT CHANGE UP (ref 12–16)
LYMPHOCYTES # BLD AUTO: 0.71 K/UL — LOW (ref 1.2–3.4)
LYMPHOCYTES # BLD AUTO: 11.3 % — LOW (ref 20.5–51.1)
MAGNESIUM SERPL-MCNC: 1.6 MG/DL — LOW (ref 1.8–2.4)
MCHC RBC-ENTMCNC: 26.6 PG — LOW (ref 27–31)
MCHC RBC-ENTMCNC: 31.5 G/DL — LOW (ref 32–37)
MCV RBC AUTO: 84.6 FL — SIGNIFICANT CHANGE UP (ref 81–99)
MONOCYTES # BLD AUTO: 1.36 K/UL — HIGH (ref 0.1–0.6)
MONOCYTES NFR BLD AUTO: 21.8 % — HIGH (ref 1.7–9.3)
NEUTROPHILS # BLD AUTO: 4.07 K/UL — SIGNIFICANT CHANGE UP (ref 1.4–6.5)
NEUTROPHILS NFR BLD AUTO: 65.2 % — SIGNIFICANT CHANGE UP (ref 42.2–75.2)
PHOSPHATE SERPL-MCNC: 3.3 MG/DL — SIGNIFICANT CHANGE UP (ref 2.1–4.9)
PLATELET # BLD AUTO: 230 K/UL — SIGNIFICANT CHANGE UP (ref 130–400)
PMV BLD: 11.3 FL — HIGH (ref 7.4–10.4)
POTASSIUM SERPL-MCNC: 4.9 MMOL/L — SIGNIFICANT CHANGE UP (ref 3.5–5)
POTASSIUM SERPL-SCNC: 4.9 MMOL/L — SIGNIFICANT CHANGE UP (ref 3.5–5)
PROT SERPL-MCNC: 6.4 G/DL — SIGNIFICANT CHANGE UP (ref 6–8)
RBC # BLD: 4.73 M/UL — SIGNIFICANT CHANGE UP (ref 4.2–5.4)
RBC # FLD: 17.5 % — HIGH (ref 11.5–14.5)
SODIUM SERPL-SCNC: 141 MMOL/L — SIGNIFICANT CHANGE UP (ref 135–146)
WBC # BLD: 6.24 K/UL — SIGNIFICANT CHANGE UP (ref 4.8–10.8)
WBC # FLD AUTO: 6.24 K/UL — SIGNIFICANT CHANGE UP (ref 4.8–10.8)

## 2024-08-15 RX ADMIN — SUCRALFATE 1 GRAM(S): 1 SUSPENSION ORAL at 17:57

## 2024-08-15 RX ADMIN — LORAZEPAM 0.25 MILLIGRAM(S): 4 INJECTION INTRAMUSCULAR; INTRAVENOUS at 17:59

## 2024-08-15 RX ADMIN — Medication 25 GRAM(S): at 11:27

## 2024-08-15 RX ADMIN — APIXABAN 2.5 MILLIGRAM(S): 5 TABLET, FILM COATED ORAL at 17:56

## 2024-08-15 RX ADMIN — METOPROLOL TARTRATE 25 MILLIGRAM(S): 100 TABLET ORAL at 17:59

## 2024-08-15 RX ADMIN — Medication 5 MILLIGRAM(S): at 22:56

## 2024-08-15 RX ADMIN — DIVALPROEX SODIUM 125 MILLIGRAM(S): 125 CAPSULE, DELAYED RELEASE ORAL at 17:57

## 2024-08-15 RX ADMIN — Medication 25 GRAM(S): at 13:59

## 2024-08-15 RX ADMIN — Medication 2 TABLET(S): at 22:56

## 2024-08-15 RX ADMIN — BUSPIRONE HYDROCHLORIDE 5 MILLIGRAM(S): 30 TABLET ORAL at 17:57

## 2024-08-15 NOTE — PROGRESS NOTE ADULT - ASSESSMENT
89-year-old female past medical history CVA, A-fib on Eliquis, HTN, diabetes, dementia confused and minimally verbal at baseline, presents BIBEMS from [place] for evaluation of fever at facility.      COVID Infection  - No evidence of acute hypoxic respiratory failure   - Chest xray: no clear infiltrate  - RA SpO2 92  - Contiue RDV for 3 days    Old CVA,   Hx Parox A-fib   - ECG on admission : sinus tachycardia   - on Eliquis 2.5 mg BID, on Metoprolol 25    HTN  - continue Losartan 25 mg    DM  - On Metformin in SNF  - diabetes seems poorly controlled (urinary gluocse >= 1000)  - HbA1c at 11  - insulin with ss    Dementia, Dysphagia  - Speech swallow assessed: Mildly thick pureed liquids with strict aspiration precautions    DVT: Apixaban  GI: Pantoprazole  Activity: as tolerated  Diet: Mildly thick pureed liquids with strict aspiration precautions  Disposition: acute, admit to medicine

## 2024-08-15 NOTE — PROGRESS NOTE ADULT - ASSESSMENT
89-year-old female past medical history CVA, A-fib on Eliquis, HTN, diabetes, dementia confused and minimally verbal at baseline, presents BIBEMS from [place] for evaluation of fever at facility.      #COVID Pneumonia   - Not in acute hypoxic respiratory failure --> in ED adequate saturation on room air  - Chest xray: no clear infiltrate  - maintain oxygen saturation >94%  - C/w remdesmevir      # CVA,   # Hx A-fib   - ECG on admission shows sinus tachycardia   - Afib maybe paroxysmal, on Eliquis 2.5 mg BID, on Metoprolol 25    #HTN  - c/w home dosage of Losartan 25 mgb    #Diabetes  - On Metformin 500 BID at [place]  - Based on UA, diabetes seems poorly controlled (urinary gluocse >= 1000)  - F/U HbA1c at 11    #Dementia   - Will obtain speech and swallow eval prior to resuming feeds    #Miscellaneous  - DVT: Apixaban  - GI: Pantoprazole  - Activity: as tolerated  - Diet: NPO until evaluated by speech and swallow  - Disposition: acute, admit to medicine   89-year-old female past medical history CVA, A-fib on Eliquis, HTN, diabetes, dementia confused and minimally verbal at baseline, presents BIBEMS from [place] for evaluation of fever at facility.      #COVID Pneumonia   - Not in acute hypoxic respiratory failure --> in ED adequate saturation on room air  - Chest xray: no clear infiltrate  - RA SpO2 92  - C/w remdesmevir    # CVA,   # Hx A-fib   - ECG on admission shows sinus tachycardia   - Afib maybe paroxysmal, on Eliquis 2.5 mg BID, on Metoprolol 25    #HTN  - c/w home dosage of Losartan 25 mgb    #Diabetes  - On Metformin 500 BID at [place]  - Based on UA, diabetes seems poorly controlled (urinary gluocse >= 1000)  - F/U HbA1c at 11    #Dementia   - Speech swallow assessed: Mildly thick pureed liquids with strict aspiration precautions    #Miscellaneous  - DVT: Apixaban  - GI: Pantoprazole  - Activity: as tolerated  - Diet: Mildly thick pureed liquids with strict aspiration precautions  - Disposition: acute, admit to medicine, c/w remdesmevir

## 2024-08-15 NOTE — ED ADULT NURSE NOTE - CHIEF COMPLAINT QUOTE
Pt. BIBA from [facility] due to hypoxia and hypotension. As per EMS pt O2 was noted 88% on room air. Blood pressure 86/40

## 2024-08-15 NOTE — ED ADULT NURSE NOTE - CAS EDN DISCHARGE ASSESSMENT
After Your EGD (Esophagogastroduodenoscopy) Instructions    DIET:  · Resume your usual diet.    ACTIVITY:  · Rest quietly at home for the remainder of the day. You may resume normal activities tomorrow.  · Do not do anything that requires coordination the day of the procedure, such as climbing ladders, using a sharp knife, operating machinery, driving.   · May resume driving and/or operating machinery in 24 hours  · May shower tomorrow  · Follow For Your Well Being Safety (given)  · Follow For Your Well Being Care After Anesthesia or Sedation (given)    WHAT TO EXPECT:  · Mild abdominal discomfort, bloating and gas pains.  · Mild throat soreness.  Warm salt-water gargle or lozenges may relieve your discomfort.  · Return of your usual bowel movements in 1-2 days.  · A tired feeling after the procedure due to the medications given to help you relax.                  PROBLEMS TO WATCH FOR - NOTIFY YOUR SURGEON IF YOU HAVE ANY OF THESE SYMPTOMS:  · Severe abdominal pain or bloating.  · Difficulty swallowing or breathing  · Neck swelling  · Excessive pain      · Chills or temperature over 101 degrees.  · Large amounts of bright red rectal bleeding, blood clots or black colored stool.  · Nausea or Vomiting of blood or black colored liquid.    FOLLOW -UP:  · If a specimen was taken, please allow at least 7-10  business days for pathology results.  · Someone will either call or send a letter with your pathology results.      If you have not received a letter or phone call, or have any question or concerns,  please call DR. Garcia [phone]  
Alert and oriented to person, place and time

## 2024-08-16 LAB
ALBUMIN SERPL ELPH-MCNC: 3.2 G/DL — LOW (ref 3.5–5.2)
ALP SERPL-CCNC: 54 U/L — SIGNIFICANT CHANGE UP (ref 30–115)
ALT FLD-CCNC: 6 U/L — SIGNIFICANT CHANGE UP (ref 0–41)
ANION GAP SERPL CALC-SCNC: 12 MMOL/L — SIGNIFICANT CHANGE UP (ref 7–14)
AST SERPL-CCNC: 19 U/L — SIGNIFICANT CHANGE UP (ref 0–41)
BASOPHILS # BLD AUTO: 0.03 K/UL — SIGNIFICANT CHANGE UP (ref 0–0.2)
BASOPHILS NFR BLD AUTO: 0.5 % — SIGNIFICANT CHANGE UP (ref 0–1)
BILIRUB DIRECT SERPL-MCNC: <0.2 MG/DL — SIGNIFICANT CHANGE UP (ref 0–0.3)
BILIRUB INDIRECT FLD-MCNC: >0 MG/DL — LOW (ref 0.2–1.2)
BILIRUB SERPL-MCNC: 0.2 MG/DL — SIGNIFICANT CHANGE UP (ref 0.2–1.2)
BUN SERPL-MCNC: 15 MG/DL — SIGNIFICANT CHANGE UP (ref 10–20)
CALCIUM SERPL-MCNC: 8.5 MG/DL — SIGNIFICANT CHANGE UP (ref 8.4–10.5)
CHLORIDE SERPL-SCNC: 101 MMOL/L — SIGNIFICANT CHANGE UP (ref 98–110)
CO2 SERPL-SCNC: 25 MMOL/L — SIGNIFICANT CHANGE UP (ref 17–32)
CREAT SERPL-MCNC: 0.6 MG/DL — LOW (ref 0.7–1.5)
EGFR: 87 ML/MIN/1.73M2 — SIGNIFICANT CHANGE UP
EOSINOPHIL # BLD AUTO: 0.09 K/UL — SIGNIFICANT CHANGE UP (ref 0–0.7)
EOSINOPHIL NFR BLD AUTO: 1.5 % — SIGNIFICANT CHANGE UP (ref 0–8)
GLUCOSE BLDC GLUCOMTR-MCNC: 102 MG/DL — HIGH (ref 70–99)
GLUCOSE BLDC GLUCOMTR-MCNC: 131 MG/DL — HIGH (ref 70–99)
GLUCOSE BLDC GLUCOMTR-MCNC: 177 MG/DL — HIGH (ref 70–99)
GLUCOSE BLDC GLUCOMTR-MCNC: 182 MG/DL — HIGH (ref 70–99)
GLUCOSE SERPL-MCNC: 145 MG/DL — HIGH (ref 70–99)
HCT VFR BLD CALC: 38.7 % — SIGNIFICANT CHANGE UP (ref 37–47)
HGB BLD-MCNC: 12.3 G/DL — SIGNIFICANT CHANGE UP (ref 12–16)
IMM GRANULOCYTES NFR BLD AUTO: 0.2 % — SIGNIFICANT CHANGE UP (ref 0.1–0.3)
LYMPHOCYTES # BLD AUTO: 2.05 K/UL — SIGNIFICANT CHANGE UP (ref 1.2–3.4)
LYMPHOCYTES # BLD AUTO: 33.2 % — SIGNIFICANT CHANGE UP (ref 20.5–51.1)
MAGNESIUM SERPL-MCNC: 1.8 MG/DL — SIGNIFICANT CHANGE UP (ref 1.8–2.4)
MCHC RBC-ENTMCNC: 27.1 PG — SIGNIFICANT CHANGE UP (ref 27–31)
MCHC RBC-ENTMCNC: 31.8 G/DL — LOW (ref 32–37)
MCV RBC AUTO: 85.2 FL — SIGNIFICANT CHANGE UP (ref 81–99)
MONOCYTES # BLD AUTO: 1.33 K/UL — HIGH (ref 0.1–0.6)
MONOCYTES NFR BLD AUTO: 21.6 % — HIGH (ref 1.7–9.3)
NEUTROPHILS # BLD AUTO: 2.66 K/UL — SIGNIFICANT CHANGE UP (ref 1.4–6.5)
NEUTROPHILS NFR BLD AUTO: 43 % — SIGNIFICANT CHANGE UP (ref 42.2–75.2)
NRBC # BLD: 0 /100 WBCS — SIGNIFICANT CHANGE UP (ref 0–0)
PHOSPHATE SERPL-MCNC: 3.8 MG/DL — SIGNIFICANT CHANGE UP (ref 2.1–4.9)
PLATELET # BLD AUTO: 236 K/UL — SIGNIFICANT CHANGE UP (ref 130–400)
PMV BLD: 11 FL — HIGH (ref 7.4–10.4)
POTASSIUM SERPL-MCNC: 3.7 MMOL/L — SIGNIFICANT CHANGE UP (ref 3.5–5)
POTASSIUM SERPL-SCNC: 3.7 MMOL/L — SIGNIFICANT CHANGE UP (ref 3.5–5)
PROT SERPL-MCNC: 5.7 G/DL — LOW (ref 6–8)
RBC # BLD: 4.54 M/UL — SIGNIFICANT CHANGE UP (ref 4.2–5.4)
RBC # FLD: 17.5 % — HIGH (ref 11.5–14.5)
SODIUM SERPL-SCNC: 138 MMOL/L — SIGNIFICANT CHANGE UP (ref 135–146)
WBC # BLD: 6.17 K/UL — SIGNIFICANT CHANGE UP (ref 4.8–10.8)
WBC # FLD AUTO: 6.17 K/UL — SIGNIFICANT CHANGE UP (ref 4.8–10.8)

## 2024-08-16 RX ADMIN — ACETAMINOPHEN 650 MILLIGRAM(S): 325 TABLET ORAL at 20:35

## 2024-08-16 RX ADMIN — LORAZEPAM 0.25 MILLIGRAM(S): 4 INJECTION INTRAMUSCULAR; INTRAVENOUS at 19:33

## 2024-08-16 RX ADMIN — APIXABAN 2.5 MILLIGRAM(S): 5 TABLET, FILM COATED ORAL at 06:20

## 2024-08-16 RX ADMIN — METOPROLOL TARTRATE 25 MILLIGRAM(S): 100 TABLET ORAL at 06:20

## 2024-08-16 RX ADMIN — APIXABAN 2.5 MILLIGRAM(S): 5 TABLET, FILM COATED ORAL at 19:01

## 2024-08-16 RX ADMIN — BUSPIRONE HYDROCHLORIDE 5 MILLIGRAM(S): 30 TABLET ORAL at 06:20

## 2024-08-16 RX ADMIN — DIVALPROEX SODIUM 125 MILLIGRAM(S): 125 CAPSULE, DELAYED RELEASE ORAL at 19:33

## 2024-08-16 RX ADMIN — SUCRALFATE 1 GRAM(S): 1 SUSPENSION ORAL at 19:01

## 2024-08-16 RX ADMIN — CHLORHEXIDINE GLUCONATE 1 APPLICATION(S): 40 SOLUTION TOPICAL at 07:26

## 2024-08-16 RX ADMIN — Medication 5 MILLIGRAM(S): at 21:23

## 2024-08-16 RX ADMIN — BUSPIRONE HYDROCHLORIDE 5 MILLIGRAM(S): 30 TABLET ORAL at 19:01

## 2024-08-16 RX ADMIN — Medication 2 TABLET(S): at 21:23

## 2024-08-16 RX ADMIN — SUCRALFATE 1 GRAM(S): 1 SUSPENSION ORAL at 06:20

## 2024-08-16 RX ADMIN — LOSARTAN POTASSIUM 25 MILLIGRAM(S): 50 TABLET ORAL at 06:20

## 2024-08-16 RX ADMIN — REMDESIVIR 200 MILLIGRAM(S): 5 INJECTION INTRAVENOUS at 11:43

## 2024-08-16 RX ADMIN — METOPROLOL TARTRATE 25 MILLIGRAM(S): 100 TABLET ORAL at 19:02

## 2024-08-16 RX ADMIN — Medication 1: at 11:56

## 2024-08-16 RX ADMIN — LORAZEPAM 0.25 MILLIGRAM(S): 4 INJECTION INTRAMUSCULAR; INTRAVENOUS at 06:29

## 2024-08-16 RX ADMIN — ACETAMINOPHEN 650 MILLIGRAM(S): 325 TABLET ORAL at 20:01

## 2024-08-16 RX ADMIN — Medication 40 MILLIGRAM(S): at 06:20

## 2024-08-16 RX ADMIN — DIVALPROEX SODIUM 125 MILLIGRAM(S): 125 CAPSULE, DELAYED RELEASE ORAL at 07:24

## 2024-08-16 RX ADMIN — Medication 1 UNIT(S): at 22:28

## 2024-08-16 NOTE — SWALLOW BEDSIDE ASSESSMENT ADULT - SWALLOW EVAL: DIAGNOSIS
Toleration of puree and minced and moist textures. However Pt stated, minced and moist textures were hard to chew. Toleration of mildly thick liquids at bedside.
limited assessment 2'2 limited PO acceptance. Pt with wet vocal quality before trials given. Pt tolerated min trials of puree and mildly thick liquids via cup sip fed by SLP. Pt with no overt/ visual change in status post acceptance. Pt refused all other trials offered.

## 2024-08-16 NOTE — SWALLOW BEDSIDE ASSESSMENT ADULT - NS SPL SWALLOW CLINIC TRIAL FT
tolerated , moderate oral dysphagia for chewables 2'2 edentulous oral cavity
limited assessment, Pt aversive to food trials. rec puree and mildly thick liquids. Pt may benefit from calorie count as intake appears to be minimal.

## 2024-08-16 NOTE — DISCHARGE NOTE PROVIDER - NSDCMRMEDTOKEN_GEN_ALL_CORE_FT
Aveeno Daily Moisturizing topical lotion: Apply topically to affected area 2 times a day  BuSpar 5 mg oral tablet: 1 tab(s) orally 2 times a day  Depakote 125 mg oral delayed release tablet: 1 tab(s) orally 2 times a day  Eliquis 2.5 mg oral tablet: 1 tab(s) orally 2 times a day  LORazepam 0.5 mg oral tablet: 0.5 tab(s) orally 2 times a day  losartan 25 mg oral tablet: 1 tab(s) orally once a day  Melatonin 5 mg oral capsule: 1 cap(s) orally once a day  metFORMIN 500 mg oral tablet: 1 tab(s) orally 2 times a day  Metoprolol Tartrate 25 mg oral tablet: 1 tab(s) orally 2 times a day  Multiple Vitamins oral tablet: 1 tab(s) orally once a day  Neurontin 300 mg oral capsule: 1 cap(s) orally once a day  nystatin 100,000 units/g topical cream: Apply topically to affected area 2 times a day Vaginitis  omeprazole 40 mg oral delayed release capsule: 1 cap(s) orally once a day  Senna Plus 50 mg-8.6 mg oral tablet: 2 tab(s) orally once a day (at bedtime)  sucralfate 1 g oral tablet: 1 tab(s) orally 2 times a day  Tylenol 325 mg oral tablet: 2 tab(s) orally every 6 hours as needed for  mild pain  Vitamin D3 1250 mcg (50,000 intl units) oral capsule: 1 cap(s) orally once a month   apixaban 2.5 mg oral tablet: 1 tab(s) orally every 12 hours  Aveeno Daily Moisturizing topical lotion: Apply topically to affected area 2 times a day  BuSpar 5 mg oral tablet: 1 tab(s) orally 2 times a day  Depakote 125 mg oral delayed release tablet: 1 tab(s) orally 2 times a day  LORazepam 0.5 mg oral tablet: 0.5 tab(s) orally 2 times a day  Melatonin 5 mg oral capsule: 1 cap(s) orally once a day  metFORMIN 500 mg oral tablet: 1 tab(s) orally 2 times a day  Metoprolol Tartrate 25 mg oral tablet: 1 tab(s) orally 2 times a day  Multiple Vitamins oral tablet: 1 tab(s) orally once a day  Neurontin 300 mg oral capsule: 1 cap(s) orally once a day  nystatin 100,000 units/g topical cream: Apply topically to affected area 2 times a day Vaginitis  omeprazole 40 mg oral delayed release capsule: 1 cap(s) orally once a day  Senna Plus 50 mg-8.6 mg oral tablet: 2 tab(s) orally once a day (at bedtime)  sucralfate 1 g oral tablet: 1 tab(s) orally 2 times a day  Tylenol 325 mg oral tablet: 2 tab(s) orally every 6 hours as needed for  mild pain  Vitamin D3 1250 mcg (50,000 intl units) oral capsule: 1 cap(s) orally once a month

## 2024-08-16 NOTE — SWALLOW BEDSIDE ASSESSMENT ADULT - COMMENTS
Pt is known to treating SLP from day of eval, Pt presents as more alert, slightly improved speech quality, no longer wet upper airway sounds when speaking.

## 2024-08-16 NOTE — SWALLOW BEDSIDE ASSESSMENT ADULT - DATE
Thank you! Thank you for allowing me to care for you in the emergency department. It is my goal to provide you with excellent care. If you have not received excellent quality care, please ask to speak to the nurse manager. Please fill out the survey that will come to you by mail or email since we listen to your feedback! Below you will find a list of your tests from today's visit. Should you have any questions, please do not hesitate to call the emergency department. Labs  No results found for this or any previous visit (from the past 12 hour(s)). Radiologic Studies  No orders to display     ------------------------------------------------------------------------------------------------------------  The exam and treatment you received in the Emergency Department were for an urgent problem and are not intended as complete care. It is important that you follow-up with a doctor, nurse practitioner, or physician assistant to:  (1) confirm your diagnosis,  (2) re-evaluation of changes in your illness and treatment, and  (3) for ongoing care. Please take your discharge instructions with you when you go to your follow-up appointment. If you have any problem arranging a follow-up appointment, contact the Emergency Department. If your symptoms become worse or you do not improve as expected and you are unable to reach your health care provider, please return to the Emergency Department. We are available 24 hours a day. If a prescription has been provided, please have it filled as soon as possible to prevent a delay in treatment. If you have any questions or reservations about taking the medication due to side effects or interactions with other medications, please call your primary care provider or contact the ER.
14-Aug-2024
16-Aug-2024

## 2024-08-16 NOTE — SWALLOW BEDSIDE ASSESSMENT ADULT - DIET PRIOR TO ADMI
prior ST recs from Nov 2023 puree and mildly thick liquids
prior ST recs from Nov 2023 puree and mildly thick liquids

## 2024-08-16 NOTE — CHART NOTE - NSCHARTNOTEFT_GEN_A_CORE
Patient with history of dementia, A&Ox1-2. Patient is calm, not agitated, however refusing PO meds and pulled out IV/ripping off leads.   RN was able to put evening meds (Depakote and Ativan) into 2 bites of applesauce, patient took 1 bite before refusing. Unsure of how much of PO meds were ingested. Will reassess to see if patient calms down and we are able to replace IV.
Consult received for "MST Score = />2." Upon chart review, patient with stable weight, does not currently meet criteria for Protein Calorie Malnutrition risk per MST. RD remains available for assessment per protocol or as needed.    Jared Fowler [phone] or via Teams

## 2024-08-16 NOTE — SWALLOW BEDSIDE ASSESSMENT ADULT - ORAL PREPARATORY PHASE
with chewables/Reduced oral grading/Decreased mastication ability
minimal oral grading/Reduced oral grading

## 2024-08-16 NOTE — DISCHARGE NOTE PROVIDER - HOSPITAL COURSE
89-year-old female past medical history CVA, A-fib on Eliquis, HTN, diabetes, dementia confused and minimally verbal at baseline, presents BIBEMS from [place] for evaluation of fever at facility.  Per EMS patient was found to have 102 fever earlier today at home with cough and shortness of breath. Patient is minimally contributory to history secondary to dementia. In the ED patient's vitals were as follows: T 102.3  BP 93/61 SpO2 98% and RR 20. Labs were significant for a WBC count of 7.94 Hb 11.9 Cr 1. CXR was within normal limits. RVP came back positive for COVID. Patient was admitted to the hospital for management of COVID + pneumonia.     Infectious disease was consulted and they recommended RDV x 3 days. Patient recieved all 3 doses of RDV, and is medically stable to be discharged back to her [place] with outpatient follow-up. 89-year-old female past medical history CVA, A-fib on Eliquis, HTN, diabetes, dementia confused and minimally verbal at baseline, presents BIBEMS from [place] for evaluation of fever at facility.  Per EMS patient was found to have 102 fever earlier today at home with cough and shortness of breath. Patient is minimally contributory to history secondary to dementia. In the ED patient's vitals were as follows: T 102.3  BP 93/61 SpO2 98% and RR 20. Labs were significant for a WBC count of 7.94 Hb 11.9 Cr 1. CXR was within normal limits. RVP came back positive for COVID. Patient was admitted to the hospital for management of COVID + pneumonia.     Infectious disease was consulted and they recommended RDV x 5 days. Patient recieved all 3 doses of RDV, and is medically stable to be discharged back to her [place] with outpatient follow-up.

## 2024-08-16 NOTE — SWALLOW BEDSIDE ASSESSMENT ADULT - SWALLOW EVAL: RECOMMENDED FEEDING/EATING TECHNIQUES
allow for swallow between intakes/alternate food with liquid/check mouth frequently for oral residue/pocketing/crush medication (when feasible)/maintain upright posture during/after eating for 30 mins/position upright (90 degrees)/small sips/bites
crush medication (when feasible)/maintain upright posture during/after eating for 30 mins/oral hygiene/small sips/bites

## 2024-08-16 NOTE — DISCHARGE NOTE PROVIDER - ATTENDING DISCHARGE PHYSICAL EXAMINATION:
COVID Infection,severe fever  - No evidence of severe acute hypoxic respiratory failure   - Chest xray: no clear infiltrate  - RA SpO2 95%  - s/p Remdesivir    Old CVA,   Hx Parox A-fib   - ECG on admission : sinus tachycardia   - on Eliquis 2.5 mg BID, on Metoprolol 25    HTN  - continue Losartan 25 mg    DM  - On Metformin in SNF  - diabetes seems poorly controlled (urinary gluocse >= 1000)  - HbA1c at 11  - insulin with ss    Dementia, Dysphagia  - Speech swallow assessed: Mildly thick pureed liquids with strict aspiration precautions    DVT: Apixaban  GI: Pantoprazole  Activity: as tolerated  Diet: Mildly thick pureed liquids with strict aspiration precautions  Disposition: After D3 of RDV today OK to return to SNF- DC planning to check return w acute Covid infection- Quarantine/isolation, etc @ SNF d/c to [facility] today

## 2024-08-16 NOTE — SWALLOW BEDSIDE ASSESSMENT ADULT - ASR SWALLOW ASPIRATION MONITOR
change of breathing pattern/cough/gurgly voice/fever/pneumonia/throat clearing/upper respiratory infection
change of breathing pattern/position upright (90Y)/cough/gurgly voice/fever/pneumonia/throat clearing/upper respiratory infection

## 2024-08-16 NOTE — DISCHARGE NOTE PROVIDER - CARE PROVIDER_API CALL
Betzy Heller  Internal Medicine  [address] [place] [address]  Phone: [phone]  Fax: [phone]  Follow Up Time: 1 week

## 2024-08-16 NOTE — PROGRESS NOTE ADULT - ASSESSMENT
89-year-old female past medical history CVA, A-fib on Eliquis, HTN, diabetes, dementia confused and minimally verbal at baseline, presents BIBEMS from [place] for evaluation of fever at facility.      COVID Infection  - No evidence of severe acute hypoxic respiratory failure   - Chest xray: no clear infiltrate  - RA SpO2 92  - Contiue RDV for 3 days- today D3/3.    Old CVA,   Hx Parox A-fib   - ECG on admission : sinus tachycardia   - on Eliquis 2.5 mg BID, on Metoprolol 25    HTN  - continue Losartan 25 mg    DM  - On Metformin in SNF  - diabetes seems poorly controlled (urinary gluocse >= 1000)  - HbA1c at 11  - insulin with ss    Dementia, Dysphagia  - Speech swallow assessed: Mildly thick pureed liquids with strict aspiration precautions    DVT: Apixaban  GI: Pantoprazole  Activity: as tolerated  Diet: Mildly thick pureed liquids with strict aspiration precautions  Disposition: After D3 of RDV today OK to return to SNF- DC planning to check return w acute Covid infection- Quarantine/isolation, etc @ SNF

## 2024-08-16 NOTE — DISCHARGE NOTE PROVIDER - NSDCCPCAREPLAN_GEN_ALL_CORE_FT
PRINCIPAL DISCHARGE DIAGNOSIS  Diagnosis: Pneumonia due to COVID-19 virus  Assessment and Plan of Treatment: You came into the hospital for COVID pneumonia. COVID pneumonia is a lung infection caused by the coronavirus. When the virus gets in your lungs, it can cause inflammation which makes it harder for your lungs to work properly. This can lead to symptoms like difficulty breathing, coughing, and feeling tired.   We treated your COVID pneumonia and now you are clear to be discharged. If you experience any symptoms of worsening pneumonia, please seek immediate medical care.

## 2024-08-16 NOTE — PROGRESS NOTE ADULT - TIME BILLING
spending 30   minutes on this patient's case:   9   minutes w patient, 10   minutes reviewing the chart,    and  11   minutes speaking to the HO, RN and family, also coordinating care and documenting all.

## 2024-08-16 NOTE — SWALLOW BEDSIDE ASSESSMENT ADULT - SLP PERTINENT HISTORY OF CURRENT PROBLEM
89 y.o F presents from [facility] with fever (102), hypoxia in 80s, SOB and cough. Pt found to be COVID + 8/13/24. CT of abdomen reveled: LOWER CHEST: Subsegmental atelectasis at the lung bases.
89 y.o F presents from [facility] with fever (102), hypoxia in 80s, SOB and cough. Pt found to be COVID + 8/13/24. CT of abdomen reveled: LOWER CHEST: Subsegmental atelectasis at the lung bases.

## 2024-08-17 LAB
-  AMPICILLIN: SIGNIFICANT CHANGE UP
-  CIPROFLOXACIN: SIGNIFICANT CHANGE UP
-  LEVOFLOXACIN: SIGNIFICANT CHANGE UP
-  NITROFURANTOIN: SIGNIFICANT CHANGE UP
-  TETRACYCLINE: SIGNIFICANT CHANGE UP
-  VANCOMYCIN: SIGNIFICANT CHANGE UP
ALBUMIN SERPL ELPH-MCNC: 3.4 G/DL — LOW (ref 3.5–5.2)
ALP SERPL-CCNC: 56 U/L — SIGNIFICANT CHANGE UP (ref 30–115)
ALT FLD-CCNC: 6 U/L — SIGNIFICANT CHANGE UP (ref 0–41)
ANION GAP SERPL CALC-SCNC: 16 MMOL/L — HIGH (ref 7–14)
AST SERPL-CCNC: 15 U/L — SIGNIFICANT CHANGE UP (ref 0–41)
BASOPHILS # BLD AUTO: 0.04 K/UL — SIGNIFICANT CHANGE UP (ref 0–0.2)
BASOPHILS NFR BLD AUTO: 0.6 % — SIGNIFICANT CHANGE UP (ref 0–1)
BILIRUB DIRECT SERPL-MCNC: <0.2 MG/DL — SIGNIFICANT CHANGE UP (ref 0–0.3)
BILIRUB INDIRECT FLD-MCNC: >0 MG/DL — LOW (ref 0.2–1.2)
BILIRUB SERPL-MCNC: 0.2 MG/DL — SIGNIFICANT CHANGE UP (ref 0.2–1.2)
BUN SERPL-MCNC: 24 MG/DL — HIGH (ref 10–20)
CALCIUM SERPL-MCNC: 8.8 MG/DL — SIGNIFICANT CHANGE UP (ref 8.4–10.5)
CHLORIDE SERPL-SCNC: 103 MMOL/L — SIGNIFICANT CHANGE UP (ref 98–110)
CO2 SERPL-SCNC: 22 MMOL/L — SIGNIFICANT CHANGE UP (ref 17–32)
CREAT SERPL-MCNC: 0.7 MG/DL — SIGNIFICANT CHANGE UP (ref 0.7–1.5)
CULTURE RESULTS: ABNORMAL
EGFR: 84 ML/MIN/1.73M2 — SIGNIFICANT CHANGE UP
EOSINOPHIL # BLD AUTO: 0.08 K/UL — SIGNIFICANT CHANGE UP (ref 0–0.7)
EOSINOPHIL NFR BLD AUTO: 1.2 % — SIGNIFICANT CHANGE UP (ref 0–8)
GLUCOSE BLDC GLUCOMTR-MCNC: 103 MG/DL — HIGH (ref 70–99)
GLUCOSE BLDC GLUCOMTR-MCNC: 104 MG/DL — HIGH (ref 70–99)
GLUCOSE BLDC GLUCOMTR-MCNC: 184 MG/DL — HIGH (ref 70–99)
GLUCOSE BLDC GLUCOMTR-MCNC: 93 MG/DL — SIGNIFICANT CHANGE UP (ref 70–99)
GLUCOSE SERPL-MCNC: 132 MG/DL — HIGH (ref 70–99)
HCT VFR BLD CALC: 38.8 % — SIGNIFICANT CHANGE UP (ref 37–47)
HGB BLD-MCNC: 12.3 G/DL — SIGNIFICANT CHANGE UP (ref 12–16)
IMM GRANULOCYTES NFR BLD AUTO: 0.2 % — SIGNIFICANT CHANGE UP (ref 0.1–0.3)
LYMPHOCYTES # BLD AUTO: 2.51 K/UL — SIGNIFICANT CHANGE UP (ref 1.2–3.4)
LYMPHOCYTES # BLD AUTO: 38.6 % — SIGNIFICANT CHANGE UP (ref 20.5–51.1)
MCHC RBC-ENTMCNC: 26.7 PG — LOW (ref 27–31)
MCHC RBC-ENTMCNC: 31.7 G/DL — LOW (ref 32–37)
MCV RBC AUTO: 84.3 FL — SIGNIFICANT CHANGE UP (ref 81–99)
METHOD TYPE: SIGNIFICANT CHANGE UP
MONOCYTES # BLD AUTO: 0.67 K/UL — HIGH (ref 0.1–0.6)
MONOCYTES NFR BLD AUTO: 10.3 % — HIGH (ref 1.7–9.3)
NEUTROPHILS # BLD AUTO: 3.2 K/UL — SIGNIFICANT CHANGE UP (ref 1.4–6.5)
NEUTROPHILS NFR BLD AUTO: 49.1 % — SIGNIFICANT CHANGE UP (ref 42.2–75.2)
NRBC # BLD: 0 /100 WBCS — SIGNIFICANT CHANGE UP (ref 0–0)
ORGANISM # SPEC MICROSCOPIC CNT: ABNORMAL
ORGANISM # SPEC MICROSCOPIC CNT: SIGNIFICANT CHANGE UP
PLATELET # BLD AUTO: 253 K/UL — SIGNIFICANT CHANGE UP (ref 130–400)
PMV BLD: 10.7 FL — HIGH (ref 7.4–10.4)
POTASSIUM SERPL-MCNC: 4.1 MMOL/L — SIGNIFICANT CHANGE UP (ref 3.5–5)
POTASSIUM SERPL-SCNC: 4.1 MMOL/L — SIGNIFICANT CHANGE UP (ref 3.5–5)
PROT SERPL-MCNC: 6.3 G/DL — SIGNIFICANT CHANGE UP (ref 6–8)
RBC # BLD: 4.6 M/UL — SIGNIFICANT CHANGE UP (ref 4.2–5.4)
RBC # FLD: 17.2 % — HIGH (ref 11.5–14.5)
SODIUM SERPL-SCNC: 141 MMOL/L — SIGNIFICANT CHANGE UP (ref 135–146)
SPECIMEN SOURCE: SIGNIFICANT CHANGE UP
WBC # BLD: 6.51 K/UL — SIGNIFICANT CHANGE UP (ref 4.8–10.8)
WBC # FLD AUTO: 6.51 K/UL — SIGNIFICANT CHANGE UP (ref 4.8–10.8)

## 2024-08-17 RX ADMIN — Medication 2 TABLET(S): at 21:25

## 2024-08-17 RX ADMIN — REMDESIVIR 200 MILLIGRAM(S): 5 INJECTION INTRAVENOUS at 12:41

## 2024-08-17 RX ADMIN — Medication 5 MILLIGRAM(S): at 21:25

## 2024-08-17 RX ADMIN — Medication 1: at 12:41

## 2024-08-17 NOTE — PROGRESS NOTE ADULT - ASSESSMENT
89-year-old female past medical history CVA, A-fib on Eliquis, HTN, diabetes, dementia confused and minimally verbal at baseline, presents BIBEMS from [place] for evaluation of fever at facility.  Patient is being managed for COVID pneumonia.     #COVID pneumonia  - No evidence of severe acute hypoxic respiratory failure   - Chest xray: no clear infiltrate  - RA SpO2 92  - Contiue RDV for 3 days complete    #Parox A-fib   - ECG on admission: sinus tachycardia   - C/W Eliquis 2.5 mg BID, C/W Metoprolol 25    #HTN  - continue Losartan 25 mg    #DM  - On Metformin in SNF  - diabetes seems poorly controlled (urinary gluocse >= 1000)  - HbA1c at 11  - insulin with ss    #Dementia #Dysphagia  - Speech swallow assessed: Mildly thick pureed liquids with strict aspiration precautions    DVT: Apixaban  GI: Pantoprazole  Activity: as tolerated  Diet: Mildly thick pureed liquids with strict aspiration precautions  Disposition: OK to return to SNF- DC planning to check return w acute Covid infection- Quarantine/isolation, etc @ SNF

## 2024-08-18 VITALS — HEART RATE: 85 BPM | SYSTOLIC BLOOD PRESSURE: 100 MMHG | DIASTOLIC BLOOD PRESSURE: 60 MMHG

## 2024-08-18 LAB
ALBUMIN SERPL ELPH-MCNC: 3.6 G/DL — SIGNIFICANT CHANGE UP (ref 3.5–5.2)
ALP SERPL-CCNC: 61 U/L — SIGNIFICANT CHANGE UP (ref 30–115)
ALT FLD-CCNC: 8 U/L — SIGNIFICANT CHANGE UP (ref 0–41)
ANION GAP SERPL CALC-SCNC: 14 MMOL/L — SIGNIFICANT CHANGE UP (ref 7–14)
AST SERPL-CCNC: 23 U/L — SIGNIFICANT CHANGE UP (ref 0–41)
BILIRUB DIRECT SERPL-MCNC: <0.2 MG/DL — SIGNIFICANT CHANGE UP (ref 0–0.3)
BILIRUB INDIRECT FLD-MCNC: >0.1 MG/DL — LOW (ref 0.2–1.2)
BILIRUB SERPL-MCNC: 0.3 MG/DL — SIGNIFICANT CHANGE UP (ref 0.2–1.2)
BUN SERPL-MCNC: 25 MG/DL — HIGH (ref 10–20)
CALCIUM SERPL-MCNC: 8.9 MG/DL — SIGNIFICANT CHANGE UP (ref 8.4–10.5)
CHLORIDE SERPL-SCNC: 105 MMOL/L — SIGNIFICANT CHANGE UP (ref 98–110)
CO2 SERPL-SCNC: 23 MMOL/L — SIGNIFICANT CHANGE UP (ref 17–32)
CREAT SERPL-MCNC: 0.6 MG/DL — LOW (ref 0.7–1.5)
EGFR: 87 ML/MIN/1.73M2 — SIGNIFICANT CHANGE UP
GLUCOSE BLDC GLUCOMTR-MCNC: 131 MG/DL — HIGH (ref 70–99)
GLUCOSE BLDC GLUCOMTR-MCNC: 196 MG/DL — HIGH (ref 70–99)
GLUCOSE BLDC GLUCOMTR-MCNC: 95 MG/DL — SIGNIFICANT CHANGE UP (ref 70–99)
GLUCOSE SERPL-MCNC: 168 MG/DL — HIGH (ref 70–99)
HCT VFR BLD CALC: 39 % — SIGNIFICANT CHANGE UP (ref 37–47)
HGB BLD-MCNC: 12.2 G/DL — SIGNIFICANT CHANGE UP (ref 12–16)
MCHC RBC-ENTMCNC: 26.5 PG — LOW (ref 27–31)
MCHC RBC-ENTMCNC: 31.3 G/DL — LOW (ref 32–37)
MCV RBC AUTO: 84.6 FL — SIGNIFICANT CHANGE UP (ref 81–99)
NRBC # BLD: 0 /100 WBCS — SIGNIFICANT CHANGE UP (ref 0–0)
PLATELET # BLD AUTO: 286 K/UL — SIGNIFICANT CHANGE UP (ref 130–400)
PMV BLD: 11 FL — HIGH (ref 7.4–10.4)
POTASSIUM SERPL-MCNC: 3.9 MMOL/L — SIGNIFICANT CHANGE UP (ref 3.5–5)
POTASSIUM SERPL-SCNC: 3.9 MMOL/L — SIGNIFICANT CHANGE UP (ref 3.5–5)
PROT SERPL-MCNC: 6.4 G/DL — SIGNIFICANT CHANGE UP (ref 6–8)
RBC # BLD: 4.61 M/UL — SIGNIFICANT CHANGE UP (ref 4.2–5.4)
RBC # FLD: 17.4 % — HIGH (ref 11.5–14.5)
SODIUM SERPL-SCNC: 142 MMOL/L — SIGNIFICANT CHANGE UP (ref 135–146)
WBC # BLD: 7.57 K/UL — SIGNIFICANT CHANGE UP (ref 4.8–10.8)
WBC # FLD AUTO: 7.57 K/UL — SIGNIFICANT CHANGE UP (ref 4.8–10.8)

## 2024-08-18 RX ORDER — APIXABAN 5 MG/1
1 TABLET, FILM COATED ORAL
Qty: 0 | Refills: 0 | DISCHARGE
Start: 2024-08-18

## 2024-08-18 RX ORDER — LOSARTAN POTASSIUM 50 MG/1
1 TABLET ORAL
Qty: 0 | Refills: 0 | DISCHARGE
Start: 2024-08-18

## 2024-08-18 RX ADMIN — Medication 30 MILLILITER(S): at 11:42

## 2024-08-18 RX ADMIN — DIVALPROEX SODIUM 125 MILLIGRAM(S): 125 CAPSULE, DELAYED RELEASE ORAL at 17:53

## 2024-08-18 RX ADMIN — APIXABAN 2.5 MILLIGRAM(S): 5 TABLET, FILM COATED ORAL at 17:49

## 2024-08-18 RX ADMIN — METOPROLOL TARTRATE 25 MILLIGRAM(S): 100 TABLET ORAL at 17:51

## 2024-08-18 RX ADMIN — APIXABAN 2.5 MILLIGRAM(S): 5 TABLET, FILM COATED ORAL at 06:24

## 2024-08-18 RX ADMIN — Medication 40 MILLIGRAM(S): at 06:24

## 2024-08-18 RX ADMIN — LORAZEPAM 0.25 MILLIGRAM(S): 4 INJECTION INTRAMUSCULAR; INTRAVENOUS at 06:26

## 2024-08-18 RX ADMIN — Medication 1: at 11:43

## 2024-08-18 RX ADMIN — BUSPIRONE HYDROCHLORIDE 5 MILLIGRAM(S): 30 TABLET ORAL at 06:25

## 2024-08-18 RX ADMIN — SUCRALFATE 1 GRAM(S): 1 SUSPENSION ORAL at 17:52

## 2024-08-18 RX ADMIN — SUCRALFATE 1 GRAM(S): 1 SUSPENSION ORAL at 06:25

## 2024-08-18 RX ADMIN — BUSPIRONE HYDROCHLORIDE 5 MILLIGRAM(S): 30 TABLET ORAL at 17:49

## 2024-08-18 RX ADMIN — ACETAMINOPHEN 650 MILLIGRAM(S): 325 TABLET ORAL at 12:02

## 2024-08-18 RX ADMIN — CHLORHEXIDINE GLUCONATE 1 APPLICATION(S): 40 SOLUTION TOPICAL at 06:42

## 2024-08-18 RX ADMIN — LORAZEPAM 0.25 MILLIGRAM(S): 4 INJECTION INTRAMUSCULAR; INTRAVENOUS at 17:55

## 2024-08-18 RX ADMIN — Medication 300 MILLIGRAM(S): at 11:42

## 2024-08-18 RX ADMIN — Medication 1 TABLET(S): at 11:42

## 2024-08-18 RX ADMIN — LOSARTAN POTASSIUM 25 MILLIGRAM(S): 50 TABLET ORAL at 06:26

## 2024-08-18 RX ADMIN — DIVALPROEX SODIUM 125 MILLIGRAM(S): 125 CAPSULE, DELAYED RELEASE ORAL at 06:41

## 2024-08-18 RX ADMIN — METOPROLOL TARTRATE 25 MILLIGRAM(S): 100 TABLET ORAL at 06:26

## 2024-08-18 NOTE — DISCHARGE NOTE NURSING/CASE MANAGEMENT/SOCIAL WORK - PATIENT PORTAL LINK FT
You can access the FollowMyHealth Patient Portal offered by [facility] by registering at the following website: [email or web address]. By joining [facility]’s FollowMyHealth portal, you will also be able to view your health information using other applications (apps) compatible with our system.

## 2024-08-18 NOTE — DISCHARGE NOTE NURSING/CASE MANAGEMENT/SOCIAL WORK - NSDCPEFALRISK_GEN_ALL_CORE
For information on Fall & Injury Prevention, visit: [email or web address] OR  [email or web address] OR  https://www.cdc.gov/steadi/patient.html

## 2024-08-18 NOTE — PROGRESS NOTE ADULT - SUBJECTIVE AND OBJECTIVE BOX
JAMES WOODWARD 89y Female  MRN#: [id number]     Hospital Day: 3d    SUBJECTIVE     No overnight events. Patient seen and evaluated at bedside, no acute complaints.                                             ----------------------------------------------------------  OBJECTIVE  PAST MEDICAL & SURGICAL HISTORY  Diabetes    Hypertension    Atrial fibrillation    Cerebrovascular accident (CVA)                                              -----------------------------------------------------------  ALLERGIES:  naproxen (Unknown)  Aleve (Unknown)                                            ------------------------------------------------------------    HOME MEDICATIONS  Home Medications:  Aveeno Daily Moisturizing topical lotion: Apply topically to affected area 2 times a day (14 Aug 2024 05:43)  BuSpar 5 mg oral tablet: 1 tab(s) orally 2 times a day (11 Nov 2023 12:45)  Depakote 125 mg oral delayed release tablet: 1 tab(s) orally 2 times a day (14 Aug 2024 05:47)  LORazepam 0.5 mg oral tablet: 0.5 tab(s) orally 2 times a day (11 Nov 2023 12:45)  Melatonin 5 mg oral capsule: 1 cap(s) orally once a day (14 Aug 2024 05:40)  metFORMIN 500 mg oral tablet: 1 tab(s) orally 2 times a day (11 Nov 2023 12:45)  Metoprolol Tartrate 25 mg oral tablet: 1 tab(s) orally 2 times a day (14 Aug 2024 05:44)  Multiple Vitamins oral tablet: 1 tab(s) orally once a day (14 Aug 2024 05:41)  Neurontin 300 mg oral capsule: 1 cap(s) orally once a day (14 Aug 2024 05:41)  nystatin 100,000 units/g topical cream: Apply topically to affected area 2 times a day Vaginitis (14 Aug 2024 05:46)  omeprazole 40 mg oral delayed release capsule: 1 cap(s) orally once a day (14 Aug 2024 05:45)  Senna Plus 50 mg-8.6 mg oral tablet: 2 tab(s) orally once a day (at bedtime) (11 Nov 2023 12:45)  sucralfate 1 g oral tablet: 1 tab(s) orally 2 times a day (14 Aug 2024 05:42)  Tylenol 325 mg oral tablet: 2 tab(s) orally every 6 hours as needed for  mild pain (11 Nov 2023 12:45)  Vitamin D3 1250 mcg (50,000 intl units) oral capsule: 1 cap(s) orally once a month (11 Nov 2023 12:45)                           MEDICATIONS:  STANDING MEDICATIONS  apixaban 2.5 milliGRAM(s) Oral every 12 hours  busPIRone 5 milliGRAM(s) Oral two times a day  chlorhexidine 2% Cloths 1 Application(s) Topical <User Schedule>  dextrose 10% Bolus 25 milliLiter(s) IV Bolus once  dextrose 5%. 1000 milliLiter(s) IV Continuous <Continuous>  divalproex  milliGRAM(s) Oral two times a day  gabapentin 300 milliGRAM(s) Oral daily  insulin lispro (ADMELOG) corrective regimen sliding scale   SubCutaneous three times a day before meals  LORazepam     Tablet 0.25 milliGRAM(s) Oral two times a day  losartan 25 milliGRAM(s) Oral daily  melatonin 5 milliGRAM(s) Oral at bedtime  metoprolol tartrate 25 milliGRAM(s) Oral two times a day  multivitamin 1 Tablet(s) Oral daily  pantoprazole    Tablet 40 milliGRAM(s) Oral before breakfast  remdesivir  IVPB   IV Intermittent   remdesivir  IVPB 100 milliGRAM(s) IV Intermittent every 24 hours  senna 2 Tablet(s) Oral at bedtime  sucralfate 1 Gram(s) Oral two times a day    PRN MEDICATIONS  acetaminophen     Tablet .. 650 milliGRAM(s) Oral every 6 hours PRN  aluminum hydroxide/magnesium hydroxide/simethicone Suspension 30 milliLiter(s) Oral every 4 hours PRN  dextrose Oral Gel 15 Gram(s) Oral once PRN  melatonin 3 milliGRAM(s) Oral at bedtime PRN  ondansetron Injectable 4 milliGRAM(s) IV Push every 8 hours PRN                                            ------------------------------------------------------------  VITAL SIGNS: Last 24 Hours  T(C): 36.6 (17 Aug 2024 05:02), Max: 36.6 (16 Aug 2024 20:05)  T(F): 97.8 (17 Aug 2024 05:02), Max: 97.8 (16 Aug 2024 20:05)  HR: 60 (17 Aug 2024 05:02) (60 - 99)  BP: 134/53 (17 Aug 2024 05:02) (115/68 - 136/78)  BP(mean): --  RR: 18 (17 Aug 2024 05:02) (18 - 18)  SpO2: 96% (17 Aug 2024 05:02) (85% - 96%)      08-16-24 @ 07:01  -  08-17-24 @ 07:00  --------------------------------------------------------  IN: 430 mL / OUT: 150 mL / NET: 280 mL                                             --------------------------------------------------------------  LABS:                        12.3   6.51  )-----------( 253      ( 17 Aug 2024 08:37 )             38.8     08-17    141  |  103  |  24<H>  ----------------------------<  132<H>  4.1   |  22  |  0.7    Ca    8.8      17 Aug 2024 08:37  Phos  3.8     08-16  Mg     1.8     08-16    TPro  6.3  /  Alb  3.4<L>  /  TBili  0.2  /  DBili  <0.2  /  AST  15  /  ALT  6   /  AlkPhos  56  08-17                                                            -------------------------------------------------------------  RADIOLOGY:                                            --------------------------------------------------------------    PHYSICAL EXAM:  GENERAL: AAOx1  NECK: Supple, No JVD  CHEST/LUNG: Decreased effort; clear  HEART: No murmurs. Regular rate and rhythm  ABDOMEN: Soft, Nontender, Nondistended  EXTREMITIES: No edema  SKIN: No rashes or lesions                                           --------------------------------------------------------------                
  CRISSY BRINACONSTANTIN  89y  Female      Patient is a 89y old  Female who presents with a chief complaint of Hypoxia (17 Aug 2024 13:22)        REVIEW OF SYSTEMS:  CONSTITUTIONAL: No fever, weight loss, or fatigue  EYES: No eye pain, visual disturbances, or discharge  ENMT:  No difficulty hearing, tinnitus, vertigo; No sinus or throat pain  NECK: No pain or stiffness  BREASTS: No pain, masses, or nipple discharge  RESPIRATORY: No cough, wheezing, chills or hemoptysis; No shortness of breath  CARDIOVASCULAR: No chest pain, palpitations, dizziness, or leg swelling  GASTROINTESTINAL: No abdominal or epigastric pain. No nausea, vomiting, or hematemesis; No diarrhea or constipation. No melena or hematochezia.  GENITOURINARY: No dysuria, frequency, hematuria,  incontinence+  MUSCULOSKELETAL: No joint pain or swelling; No muscle, back, or extremity pain  PSYCHIATRIC: No depression, anxiety, mood swings, or difficulty sleeping  HEME/LYMPH: No easy bruising, or bleeding gums  ALLERY AND IMMUNOLOGIC: No hives or eczema  FAMILY HISTORY:    T(C): 36.7 (08-18-24 @ 05:00), Max: 36.7 (08-17-24 @ 14:33)  HR: 90 (08-18-24 @ 05:00) (79 - 90)  BP: 133/82 (08-18-24 @ 05:00) (114/56 - 133/82)  RR: 18 (08-18-24 @ 05:00) (18 - 18)  SpO2: --  Wt(kg): --Vital Signs Last 24 Hrs  T(C): 36.7 (18 Aug 2024 05:00), Max: 36.7 (17 Aug 2024 14:33)  T(F): 98 (18 Aug 2024 05:00), Max: 98 (17 Aug 2024 14:33)  HR: 90 (18 Aug 2024 05:00) (79 - 90)  BP: 133/82 (18 Aug 2024 05:00) (114/56 - 133/82)  BP(mean): --  RR: 18 (18 Aug 2024 05:00) (18 - 18)  SpO2: --      naproxen (Unknown)  Aleve (Unknown)      PHYSICAL EXAM:  GENERAL: NAD,   HEAD:  Atraumatic, Normocephalic  EYES: EOMI, PERRLA, conjunctiva and sclera clear  ENMT: No tonsillar erythema, exudates, or enlargement;   NECK: Supple, No JVD, Normal thyroid  NERVOUS SYSTEM:  Alert   CHEST/LUNG: Clear to percussion bilaterally; No rales, rhonchi, wheezing, or rubs  HEART: Regular rate and rhythm; No murmurs, rubs, or gallops  ABDOMEN: Soft, Nontender, Nondistended; Bowel sounds present  EXTREMITIES:  , No clubbing, cyanosis, or edema  LYMPH: No lymphadenopathy noted  SKIN: No rashes or lesions      LABS:  08-17    141  |  103  |  24<H>  ----------------------------<  132<H>  4.1   |  22  |  0.7    Ca    8.8      17 Aug 2024 08:37    TPro  6.3  /  Alb  3.4<L>  /  TBili  0.2  /  DBili  <0.2  /  AST  15  /  ALT  6   /  AlkPhos  56  08-17                            12.3   6.51  )-----------( 253      ( 17 Aug 2024 08:37 )             38.8       RADIOLOGY & ADDITIONAL TESTS:    MEDICATION:  acetaminophen     Tablet .. 650 milliGRAM(s) Oral every 6 hours PRN  aluminum hydroxide/magnesium hydroxide/simethicone Suspension 30 milliLiter(s) Oral every 4 hours PRN  apixaban 2.5 milliGRAM(s) Oral every 12 hours  busPIRone 5 milliGRAM(s) Oral two times a day  chlorhexidine 2% Cloths 1 Application(s) Topical <User Schedule>  dextrose 10% Bolus 25 milliLiter(s) IV Bolus once  dextrose 5%. 1000 milliLiter(s) IV Continuous <Continuous>  dextrose Oral Gel 15 Gram(s) Oral once PRN  divalproex  milliGRAM(s) Oral two times a day  gabapentin 300 milliGRAM(s) Oral daily  insulin lispro (ADMELOG) corrective regimen sliding scale   SubCutaneous three times a day before meals  LORazepam     Tablet 0.25 milliGRAM(s) Oral two times a day  losartan 25 milliGRAM(s) Oral daily  melatonin 3 milliGRAM(s) Oral at bedtime PRN  melatonin 5 milliGRAM(s) Oral at bedtime  metoprolol tartrate 25 milliGRAM(s) Oral two times a day  multivitamin 1 Tablet(s) Oral daily  ondansetron Injectable 4 milliGRAM(s) IV Push every 8 hours PRN  pantoprazole    Tablet 40 milliGRAM(s) Oral before breakfast  senna 2 Tablet(s) Oral at bedtime  sucralfate 1 Gram(s) Oral two times a day      HEALTH ISSUES - PROBLEM Dx:      89-year-old female past medical history CVA, A-fib on Eliquis, HTN, diabetes, dementia confused and minimally verbal at baseline, presents BIBEMS from [facility] for evaluation of fever at facility.      COVID Infection,severe fever  - No evidence of severe acute hypoxic respiratory failure   - Chest xray: no clear infiltrate  - RA SpO2 95%  - s/p Remdesivir    Old CVA,   Hx Parox A-fib   - ECG on admission : sinus tachycardia   - on Eliquis 2.5 mg BID, on Metoprolol 25    HTN  - continue Losartan 25 mg    DM  - On Metformin in SNF  - diabetes seems poorly controlled (urinary gluocse >= 1000)  - HbA1c at 11  - insulin with ss    Dementia, Dysphagia  - Speech swallow assessed: Mildly thick pureed liquids with strict aspiration precautions    DVT: Apixaban  GI: Pantoprazole  Activity: as tolerated  Diet: Mildly thick pureed liquids with strict aspiration precautions  Disposition: After D3 of RDV today OK to return to SNF- DC planning to check return w acute Covid infection- Quarantine/isolation, etc @ SNF d/c to [facility] today  
  CRISSY JAMES  89y  Female  HPI:  89-year-old female past medical history CVA, A-fib on Eliquis, HTN, diabetes, dementia confused and minimally verbal at baseline, presents BIBEMS from [facility] for evaluation of fever at facility.      Per EMS patient found to have 102 fever earlier today at home with cough and shortness of breath so EMS was called.    EMS found patient hypoxic to 80s with BP 80s/40s and brought to ED for evaluation.     Patient minimally contributory to history secondary to dementia. When examined by me in the ED, patient was on room air, bad waveform on finger oximetry but did not appear dyspneic or in distress    In the ED  - Vitals: T 102.3  BP 93/61 SpO2 98% and RR 20  - Labs: WBC 7.94 Hb 11.9 Cr 1   - Imaging: No identifiable opacities in either lung fields (my read)  - RVP (+) for COVID    Patient will be admitted to the hospital for mild covid pneumonia (14 Aug 2024 05:13)    MEDICATIONS  (STANDING):  apixaban 2.5 milliGRAM(s) Oral every 12 hours  busPIRone 5 milliGRAM(s) Oral two times a day  chlorhexidine 2% Cloths 1 Application(s) Topical <User Schedule>  dextrose 10% Bolus 25 milliLiter(s) IV Bolus once  dextrose 5%. 1000 milliLiter(s) (50 mL/Hr) IV Continuous <Continuous>  divalproex  milliGRAM(s) Oral two times a day  gabapentin 300 milliGRAM(s) Oral daily  insulin lispro (ADMELOG) corrective regimen sliding scale   SubCutaneous three times a day before meals  LORazepam     Tablet 0.25 milliGRAM(s) Oral two times a day  losartan 25 milliGRAM(s) Oral daily  melatonin 5 milliGRAM(s) Oral at bedtime  metoprolol tartrate 25 milliGRAM(s) Oral two times a day  multivitamin 1 Tablet(s) Oral daily  pantoprazole    Tablet 40 milliGRAM(s) Oral before breakfast  remdesivir  IVPB 100 milliGRAM(s) IV Intermittent every 24 hours  remdesivir  IVPB   IV Intermittent   senna 2 Tablet(s) Oral at bedtime  sucralfate 1 Gram(s) Oral two times a day    MEDICATIONS  (PRN):  acetaminophen     Tablet .. 650 milliGRAM(s) Oral every 6 hours PRN Temp greater or equal to 38C (100.4F), Mild Pain (1 - 3)  aluminum hydroxide/magnesium hydroxide/simethicone Suspension 30 milliLiter(s) Oral every 4 hours PRN Dyspepsia  dextrose Oral Gel 15 Gram(s) Oral once PRN Blood Glucose LESS THAN 70 milliGRAM(s)/deciliter  melatonin 3 milliGRAM(s) Oral at bedtime PRN Insomnia  ondansetron Injectable 4 milliGRAM(s) IV Push every 8 hours PRN Nausea and/or Vomiting    INTERVAL EVENTS: Patient sen today, appears weak. Patient denies SOB and cough?    T(C): 36.3 (08-15-24 @ 15:55), Max: 36.3 (08-15-24 @ 05:29)  HR: 90 (08-15-24 @ 15:55) (78 - 90)  BP: 119/63 (08-15-24 @ 15:55) (111/72 - 139/75)  RR: 18 (08-15-24 @ 15:55) (18 - 18)  SpO2: 98% (08-15-24 @ 15:55) (98% - 98%)  Wt(kg): --Vital Signs Last 24 Hrs  T(C): 36.3 (15 Aug 2024 15:55), Max: 36.3 (15 Aug 2024 05:29)  T(F): 97.3 (15 Aug 2024 15:55), Max: 97.3 (15 Aug 2024 05:29)  HR: 90 (15 Aug 2024 15:55) (78 - 90)  BP: 119/63 (15 Aug 2024 15:55) (111/72 - 139/75)  BP(mean): 86 (15 Aug 2024 15:55) (86 - 86)  RR: 18 (15 Aug 2024 15:55) (18 - 18)  SpO2: 98% (15 Aug 2024 15:55) (98% - 98%)    Parameters below as of 15 Aug 2024 15:55  Patient On (Oxygen Delivery Method): room air        PHYSICAL EXAM:  GENERAL: Weak, off O2  NECK: Supple, No JVD  CHEST/LUNG: Decreased effort  HEART: S1, S2, Regular rate and rhythm  ABDOMEN: Soft, Nontender, Nondistended; Bowel sounds present  EXTREMITIES: Noedema  SKIN: No rashes or lesions    LABS:                        12.6   6.24  )-----------( 230      ( 15 Aug 2024 08:16 )             40.0             08-15    141  |  100  |  15  ----------------------------<  116<H>  4.9   |  24  |  0.7    Ca    8.8      15 Aug 2024 08:16  Phos  3.3     08-15  Mg     1.6     08-15    TPro  6.4  /  Alb  3.6  /  TBili  0.2  /  DBili  <0.2  /  AST  17  /  ALT  7   /  AlkPhos  63  08-15    LIVER FUNCTIONS - ( 15 Aug 2024 08:16 )  Alb: 3.6 g/dL / Pro: 6.4 g/dL / ALK PHOS: 63 U/L / ALT: 7 U/L / AST: 17 U/L / GGT: x                 PT/INR - ( 13 Aug 2024 23:55 )   PT: 13.00 sec;   INR: 1.14 ratio         PTT - ( 13 Aug 2024 23:55 )  PTT:19.5 sec        Urinalysis Basic - ( 15 Aug 2024 08:16 )    Color: x / Appearance: x / SG: x / pH: x  Gluc: 116 mg/dL / Ketone: x  / Bili: x / Urobili: x   Blood: x / Protein: x / Nitrite: x   Leuk Esterase: x / RBC: x / WBC x   Sq Epi: x / Non Sq Epi: x / Bacteria: x        Culture - Blood (collected 13 Aug 2024 23:55)  Source: .Blood Blood-Peripheral  Preliminary Report (15 Aug 2024 09:01):    No growth at 24 hours    Culture - Blood (collected 13 Aug 2024 23:55)  Source: .Blood Blood-Peripheral  Preliminary Report (15 Aug 2024 09:01):    No growth at 24 hours      RADIOLOGY & ADDITIONAL TESTS:  < from: Xray Chest 1 View- PORTABLE-Urgent (08.14.24 @ 01:16) >  PROCEDURE DATE:  08/14/2024          INTERPRETATION:  Clinical History / Reason for exam: Sepsis.    Comparison : Chest radiograph 11/11/2023.    Technique/Positioning: AP chest.    Findings:    Support devices: None.    Cardiac/mediastinum/hilum: Stable cardiomegaly    Lung parenchyma/Pleura: Within normal limits.    Skeleton/soft tissues: Degenerative changes    Impression:    No radiographic evidence of acute cardiopulmonary disease.        --- End of Report ---          < end of copied text >  
  JAMES WOODWARD  89y  Female      Patient is a 89y old  Female who presents with a chief complaint of COVID Pneumonia  (16 Aug 2024 11:51)        REVIEW OF SYSTEMS:  CONSTITUTIONAL: No fever, weight loss, or fatigue  EYES: No eye pain, visual disturbances, or discharge  ENMT:  No difficulty hearing, tinnitus, vertigo; No sinus or throat pain  NECK: No pain or stiffness  BREASTS: No pain, masses, or nipple discharge  RESPIRATORY: No cough, wheezing, chills or hemoptysis; No shortness of breath  CARDIOVASCULAR: No chest pain, palpitations, dizziness, or leg swelling  GASTROINTESTINAL: No abdominal or epigastric pain. No nausea, vomiting, or hematemesis; No diarrhea or constipation. No melena or hematochezia.  GENITOURINARY: No dysuria, frequency, hematuria,  incontinence+  MUSCULOSKELETAL: No joint pain or swelling; No muscle, back, or extremity pain  PSYCHIATRIC: No depression, anxiety, mood swings, or difficulty sleeping  HEME/LYMPH: No easy bruising, or bleeding gums  ALLERY AND IMMUNOLOGIC: No hives or eczema  FAMILY HISTORY:    T(C): 36.6 (08-17-24 @ 05:02), Max: 36.6 (08-16-24 @ 20:05)  HR: 60 (08-17-24 @ 05:02) (60 - 99)  BP: 134/53 (08-17-24 @ 05:02) (115/68 - 136/78)  RR: 18 (08-17-24 @ 05:02) (18 - 18)  SpO2: 96% (08-17-24 @ 05:02) (85% - 96%)  Wt(kg): --Vital Signs Last 24 Hrs  T(C): 36.6 (17 Aug 2024 05:02), Max: 36.6 (16 Aug 2024 20:05)  T(F): 97.8 (17 Aug 2024 05:02), Max: 97.8 (16 Aug 2024 20:05)  HR: 60 (17 Aug 2024 05:02) (60 - 99)  BP: 134/53 (17 Aug 2024 05:02) (115/68 - 136/78)  BP(mean): --  RR: 18 (17 Aug 2024 05:02) (18 - 18)  SpO2: 96% (17 Aug 2024 05:02) (85% - 96%)    Parameters below as of 16 Aug 2024 15:26  Patient On (Oxygen Delivery Method): nasal cannula  O2 Flow (L/min): 2    naproxen (Unknown)  Aleve (Unknown)      PHYSICAL EXAM:  GENERAL: NAD,  HEAD:  Atraumatic, Normocephalic  EYES: EOMI, PERRLA, conjunctiva and sclera clear  ENMT: No tonsillar erythema, exudates, or enlargement;   NECK: Supple, No JVD, Normal thyroid  NERVOUS SYSTEM:  Alert   CHEST/LUNG: Clear to percussion bilaterally; No rales, rhonchi, wheezing, or rubs  HEART: Regular rate and rhythm; No murmurs, rubs, or gallops  ABDOMEN: Soft, Nontender, Nondistended; Bowel sounds present  EXTREMITIES:  , No clubbing, cyanosis, or edema  LYMPH: No lymphadenopathy noted  SKIN: No rashes or lesions      LABS:  08-16    138  |  101  |  15  ----------------------------<  145<H>  3.7   |  25  |  0.6<L>    Ca    8.5      16 Aug 2024 07:55  Phos  3.8     08-16  Mg     1.8     08-16    TPro  5.7<L>  /  Alb  3.2<L>  /  TBili  0.2  /  DBili  <0.2  /  AST  19  /  ALT  6   /  AlkPhos  54  08-16                          12.3   6.17  )-----------( 236      ( 16 Aug 2024 07:55 )             38.7   < from: CT Abdomen and Pelvis w/ Oral Cont and w/ IV Cont (07.02.24 @ 11:21) >  No acute abnormality in the abdomen and pelvis.    < end of copied text >        RADIOLOGY & ADDITIONAL TESTS:    MEDICATION:  acetaminophen     Tablet .. 650 milliGRAM(s) Oral every 6 hours PRN  aluminum hydroxide/magnesium hydroxide/simethicone Suspension 30 milliLiter(s) Oral every 4 hours PRN  apixaban 2.5 milliGRAM(s) Oral every 12 hours  busPIRone 5 milliGRAM(s) Oral two times a day  chlorhexidine 2% Cloths 1 Application(s) Topical <User Schedule>  dextrose 10% Bolus 25 milliLiter(s) IV Bolus once  dextrose 5%. 1000 milliLiter(s) IV Continuous <Continuous>  dextrose Oral Gel 15 Gram(s) Oral once PRN  divalproex  milliGRAM(s) Oral two times a day  gabapentin 300 milliGRAM(s) Oral daily  insulin lispro (ADMELOG) corrective regimen sliding scale   SubCutaneous three times a day before meals  LORazepam     Tablet 0.25 milliGRAM(s) Oral two times a day  losartan 25 milliGRAM(s) Oral daily  melatonin 3 milliGRAM(s) Oral at bedtime PRN  melatonin 5 milliGRAM(s) Oral at bedtime  metoprolol tartrate 25 milliGRAM(s) Oral two times a day  multivitamin 1 Tablet(s) Oral daily  ondansetron Injectable 4 milliGRAM(s) IV Push every 8 hours PRN  pantoprazole    Tablet 40 milliGRAM(s) Oral before breakfast  remdesivir  IVPB 100 milliGRAM(s) IV Intermittent every 24 hours  remdesivir  IVPB   IV Intermittent   senna 2 Tablet(s) Oral at bedtime  sucralfate 1 Gram(s) Oral two times a day      HEALTH ISSUES - PROBLEM Dx:  89-year-old female past medical history CVA, A-fib on Eliquis, HTN, diabetes, dementia confused and minimally verbal at baseline, presents BIBEMS from [place] for evaluation of fever at facility.      COVID Infection,severe fever  - No evidence of severe acute hypoxic respiratory failure   - Chest xray: no clear infiltrate  - RA SpO2 92  - Contiue RDV today is the last day    Old CVA,   Hx Parox A-fib   - ECG on admission : sinus tachycardia   - on Eliquis 2.5 mg BID, on Metoprolol 25    HTN  - continue Losartan 25 mg    DM  - On Metformin in SNF  - diabetes seems poorly controlled (urinary gluocse >= 1000)  - HbA1c at 11  - insulin with ss    Dementia, Dysphagia  - Speech swallow assessed: Mildly thick pureed liquids with strict aspiration precautions    DVT: Apixaban  GI: Pantoprazole  Activity: as tolerated  Diet: Mildly thick pureed liquids with strict aspiration precautions  Disposition: After D3 of RDV today OK to return to SNF- DC planning to check return w acute Covid infection- Quarantine/isolation, etc @ SNF d/c to [facility] today          
24H events:    Patient is a 89y old Female who presents with a chief complaint of Hypoxia (14 Aug 2024 10:10)    Primary diagnosis of 2019 novel coronavirus disease (COVID-19)       Today is hospital day 1d. This morning patient was seen and examined at bedside, resting comfortably in bed. Patient agitated overnight, pulled IV out.       PAST MEDICAL & SURGICAL HISTORY  Diabetes    Hypertension    Atrial fibrillation    Cerebrovascular accident (CVA)      SOCIAL HISTORY:  Social History:      ALLERGIES:  naproxen (Unknown)  Aleve (Unknown)    MEDICATIONS:  STANDING MEDICATIONS  apixaban 2.5 milliGRAM(s) Oral every 12 hours  busPIRone 5 milliGRAM(s) Oral two times a day  chlorhexidine 2% Cloths 1 Application(s) Topical <User Schedule>  dextrose 10% Bolus 25 milliLiter(s) IV Bolus once  dextrose 5%. 1000 milliLiter(s) IV Continuous <Continuous>  divalproex  milliGRAM(s) Oral two times a day  gabapentin 300 milliGRAM(s) Oral daily  insulin lispro (ADMELOG) corrective regimen sliding scale   SubCutaneous three times a day before meals  LORazepam     Tablet 0.25 milliGRAM(s) Oral two times a day  losartan 25 milliGRAM(s) Oral daily  magnesium sulfate  IVPB 2 Gram(s) IV Intermittent every 2 hours  melatonin 5 milliGRAM(s) Oral at bedtime  metoprolol tartrate 25 milliGRAM(s) Oral two times a day  multivitamin 1 Tablet(s) Oral daily  pantoprazole    Tablet 40 milliGRAM(s) Oral before breakfast  remdesivir  IVPB 100 milliGRAM(s) IV Intermittent every 24 hours  remdesivir  IVPB   IV Intermittent   senna 2 Tablet(s) Oral at bedtime  sucralfate 1 Gram(s) Oral two times a day    PRN MEDICATIONS  acetaminophen     Tablet .. 650 milliGRAM(s) Oral every 6 hours PRN  aluminum hydroxide/magnesium hydroxide/simethicone Suspension 30 milliLiter(s) Oral every 4 hours PRN  dextrose Oral Gel 15 Gram(s) Oral once PRN  melatonin 3 milliGRAM(s) Oral at bedtime PRN  ondansetron Injectable 4 milliGRAM(s) IV Push every 8 hours PRN    VITALS:   T(F): 97  HR: 84  BP: 111/72  RR: 18  SpO2: 98%    PHYSICAL EXAM:  GENERAL: NAD, well-groomed, well-developed  HEAD:  Atraumatic, Normocephalic  EYES: EOMI  NECK: Supple  NERVOUS SYSTEM:  Alert & Oriented X3, non focal   CHEST/LUNG: Clear to auscultation bilaterally; No rales, rhonchi, wheezing, or rubs  HEART: Regular rate and rhythm; No murmurs, rubs, or gallops  ABDOMEN: Soft, Nontender, Nondistended; Bowel sounds present  EXTREMITIES:  2+ Peripheral Pulses, No clubbing, cyanosis, or edema  LYMPH: No lymphadenopathy noted  SKIN: No rashes or lesions  LABS:                        12.6   6.24  )-----------( 230      ( 15 Aug 2024 08:16 )             40.0     08-15    141  |  100  |  15  ----------------------------<  116<H>  4.9   |  24  |  0.7    Ca    8.8      15 Aug 2024 08:16  Phos  3.3     08-15  Mg     1.6     08-15    TPro  6.4  /  Alb  3.6  /  TBili  0.2  /  DBili  <0.2  /  AST  17  /  ALT  7   /  AlkPhos  63  08-15    PT/INR - ( 13 Aug 2024 23:55 )   PT: 13.00 sec;   INR: 1.14 ratio         PTT - ( 13 Aug 2024 23:55 )  PTT:19.5 sec  Urinalysis Basic - ( 15 Aug 2024 08:16 )    Color: x / Appearance: x / SG: x / pH: x  Gluc: 116 mg/dL / Ketone: x  / Bili: x / Urobili: x   Blood: x / Protein: x / Nitrite: x   Leuk Esterase: x / RBC: x / WBC x   Sq Epi: x / Non Sq Epi: x / Bacteria: x            Culture - Blood (collected 13 Aug 2024 23:55)  Source: .Blood Blood-Peripheral  Preliminary Report (15 Aug 2024 09:01):    No growth at 24 hours    Culture - Blood (collected 13 Aug 2024 23:55)  Source: .Blood Blood-Peripheral  Preliminary Report (15 Aug 2024 09:01):    No growth at 24 hours          RADIOLOGY:          
Chart reviewed, patient examined. Pertinent results reviewed.  Case discussed with HO; specialist f/u reviewed  HD#3  JAMES WOODWARD    HPI:  89-year-old female past medical history CVA, A-fib on Eliquis, HTN, diabetes, dementia confused and minimally verbal at baseline, presents BIBEMS from [place] for evaluation of fever at facility.      Per EMS patient found to have 102 fever earlier the DOA at [facility] with cough and shortness of breath so EMS was called.    EMS found patient hypoxic to 80s with BP 80s/40s and brought to ED for evaluation. Evaluation here revealed + COVID 19 infection.    Patient minimally contributory to history secondary to dementia. When examined by me in the ED, patient was on room air, bad waveform on finger oximetry but did not appear dyspneic or in distress    In the ED  - Vitals: T 102.3  BP 93/61 SpO2 98% and RR 20  - Labs: WBC 7.94 Hb 11.9 Cr 1   - Imaging: No identifiable opacities in either lung fields per ED staff  - RVP (+) for COVID    Patient  admitted to the hospital for mild covid pneumonia (14 Aug 2024 05:13), seen by ID, and given Remdisivir- today D3/3.    MEDICATIONS  (STANDING):  apixaban 2.5 milliGRAM(s) Oral every 12 hours  busPIRone 5 milliGRAM(s) Oral two times a day  chlorhexidine 2% Cloths 1 Application(s) Topical <User Schedule>  dextrose 10% Bolus 25 milliLiter(s) IV Bolus once  dextrose 5%. 1000 milliLiter(s) (50 mL/Hr) IV Continuous <Continuous>  divalproex  milliGRAM(s) Oral two times a day  gabapentin 300 milliGRAM(s) Oral daily  insulin lispro (ADMELOG) corrective regimen sliding scale   SubCutaneous three times a day before meals  LORazepam     Tablet 0.25 milliGRAM(s) Oral two times a day  losartan 25 milliGRAM(s) Oral daily  melatonin 5 milliGRAM(s) Oral at bedtime  metoprolol tartrate 25 milliGRAM(s) Oral two times a day  multivitamin 1 Tablet(s) Oral daily  pantoprazole    Tablet 40 milliGRAM(s) Oral before breakfast  remdesivir  IVPB 100 milliGRAM(s) IV Intermittent every 24 hours---D3/3 today  remdesivir  IVPB   IV Intermittent   senna 2 Tablet(s) Oral at bedtime  sucralfate 1 Gram(s) Oral two times a day    MEDICATIONS  (PRN):  acetaminophen     Tablet .. 650 milliGRAM(s) Oral every 6 hours PRN Temp greater or equal to 38C (100.4F), Mild Pain (1 - 3)  aluminum hydroxide/magnesium hydroxide/simethicone Suspension 30 milliLiter(s) Oral every 4 hours PRN Dyspepsia  dextrose Oral Gel 15 Gram(s) Oral once PRN Blood Glucose LESS THAN 70 milliGRAM(s)/deciliter  melatonin 3 milliGRAM(s) Oral at bedtime PRN Insomnia  ondansetron Injectable 4 milliGRAM(s) IV Push every 8 hours PRN Nausea and/or Vomiting    INTERVAL EVENTS: Patient sen today, appears weak. Patient denies SOB and cough? She is hungry.       Vital Signs Last 24 Hrs  T(C): 36.6 (16 Aug 2024 05:00), Max: 36.6 (16 Aug 2024 05:00)  T(F): 97.8 (16 Aug 2024 05:00), Max: 97.8 (16 Aug 2024 05:00)  HR: 85 (16 Aug 2024 05:00) (85 - 90)  BP: 142/82 (16 Aug 2024 05:00) (119/63 - 142/82)  BP(mean): 92 (15 Aug 2024 18:58) (86 - 92)  RR: 18 (15 Aug 2024 15:55) (18 - 18)  SpO2: 94% (15 Aug 2024 20:00) (94% - 98%)    Parameters below as of 16 Aug 2024 05:00  Patient On (Oxygen Delivery Method): nasal cannula  O2 Flow (L/min): 2    PHYSICAL EXAM:  GENERAL: Weak, NC O2; AAOx1; + GGNH; " where is breakfast"  EENT_ R droopy NLF; MMM  NECK: Supple, No JVD  CHEST/LUNG: Decreased effort; clear  HEART: RRR, no MRG  ABDOMEN: Soft, Nontender, Nondistended; Bowel sounds present  EXTREMITIES: No edema  SKIN: No rashes or lesions    LABS:                        12.6   6.24  )-----------( 230      ( 15 Aug 2024 08:16 )             40.0             08-15    141  |  100  |  15  ----------------------------<  116<H>  4.9   |  24  |  0.7    Ca    8.8      15 Aug 2024 08:16  Phos  3.3     08-15  Mg     1.6     08-15    TPro  6.4  /  Alb  3.6  /  TBili  0.2  /  DBili  <0.2  /  AST  17  /  ALT  7   /  AlkPhos  63  08-15    LIVER FUNCTIONS - ( 15 Aug 2024 08:16 )  Alb: 3.6 g/dL / Pro: 6.4 g/dL / ALK PHOS: 63 U/L / ALT: 7 U/L / AST: 17 U/L / GGT: x                 PT/INR - ( 13 Aug 2024 23:55 )   PT: 13.00 sec;   INR: 1.14 ratio         PTT - ( 13 Aug 2024 23:55 )  PTT:19.5 sec        Urinalysis Basic - ( 15 Aug 2024 08:16 )    Color: x / Appearance: x / SG: x / pH: x  Gluc: 116 mg/dL / Ketone: x  / Bili: x / Urobili: x   Blood: x / Protein: x / Nitrite: x   Leuk Esterase: x / RBC: x / WBC x   Sq Epi: x / Non Sq Epi: x / Bacteria: x        Culture - Blood (collected 13 Aug 2024 23:55)  Source: .Blood Blood-Peripheral  Preliminary Report (15 Aug 2024 09:01):    No growth at 24 hours    Culture - Blood (collected 13 Aug 2024 23:55)  Source: .Blood Blood-Peripheral  Preliminary Report (15 Aug 2024 09:01):    No growth at 24 hours      RADIOLOGY & ADDITIONAL TESTS:  < from: Xray Chest 1 View- PORTABLE-Urgent (08.14.24 @ 01:16) >  PROCEDURE DATE:  08/14/2024          INTERPRETATION:  Clinical History / Reason for exam: Sepsis.    Comparison : Chest radiograph 11/11/2023.    Technique/Positioning: AP chest.    Findings:    Support devices: None.    Cardiac/mediastinum/hilum: Stable cardiomegaly    Lung parenchyma/Pleura: Within normal limits.    Skeleton/soft tissues: Degenerative changes    Impression:    No radiographic evidence of acute cardiopulmonary disease.        --- End of Report ---          < end of copied text >

## 2024-08-19 LAB
CULTURE RESULTS: SIGNIFICANT CHANGE UP
CULTURE RESULTS: SIGNIFICANT CHANGE UP
SPECIMEN SOURCE: SIGNIFICANT CHANGE UP
SPECIMEN SOURCE: SIGNIFICANT CHANGE UP

## 2024-08-25 DIAGNOSIS — D84.81 IMMUNODEFICIENCY DUE TO CONDITIONS CLASSIFIED ELSEWHERE: ICD-10-CM

## 2024-08-25 DIAGNOSIS — I48.0 PAROXYSMAL ATRIAL FIBRILLATION: ICD-10-CM

## 2024-08-25 DIAGNOSIS — Z86.73 PERSONAL HISTORY OF TRANSIENT ISCHEMIC ATTACK (TIA), AND CEREBRAL INFARCTION WITHOUT RESIDUAL DEFICITS: ICD-10-CM

## 2024-08-25 DIAGNOSIS — E11.51 TYPE 2 DIABETES MELLITUS WITH DIABETIC PERIPHERAL ANGIOPATHY WITHOUT GANGRENE: ICD-10-CM

## 2024-08-25 DIAGNOSIS — I10 ESSENTIAL (PRIMARY) HYPERTENSION: ICD-10-CM

## 2024-08-25 DIAGNOSIS — Z79.01 LONG TERM (CURRENT) USE OF ANTICOAGULANTS: ICD-10-CM

## 2024-08-25 DIAGNOSIS — Z90.49 ACQUIRED ABSENCE OF OTHER SPECIFIED PARTS OF DIGESTIVE TRACT: ICD-10-CM

## 2024-08-25 DIAGNOSIS — F31.9 BIPOLAR DISORDER, UNSPECIFIED: ICD-10-CM

## 2024-08-25 DIAGNOSIS — A41.81 SEPSIS DUE TO ENTEROCOCCUS: ICD-10-CM

## 2024-08-25 DIAGNOSIS — R13.10 DYSPHAGIA, UNSPECIFIED: ICD-10-CM

## 2024-08-25 DIAGNOSIS — Z88.6 ALLERGY STATUS TO ANALGESIC AGENT: ICD-10-CM

## 2024-08-25 DIAGNOSIS — N39.0 URINARY TRACT INFECTION, SITE NOT SPECIFIED: ICD-10-CM

## 2024-08-25 DIAGNOSIS — K21.9 GASTRO-ESOPHAGEAL REFLUX DISEASE WITHOUT ESOPHAGITIS: ICD-10-CM

## 2024-08-25 DIAGNOSIS — J12.82 PNEUMONIA DUE TO CORONAVIRUS DISEASE 2019: ICD-10-CM

## 2024-08-25 DIAGNOSIS — R09.02 HYPOXEMIA: ICD-10-CM

## 2024-08-25 DIAGNOSIS — F03.90 UNSPECIFIED DEMENTIA, UNSPECIFIED SEVERITY, WITHOUT BEHAVIORAL DISTURBANCE, PSYCHOTIC DISTURBANCE, MOOD DISTURBANCE, AND ANXIETY: ICD-10-CM

## 2024-08-25 DIAGNOSIS — Z79.84 LONG TERM (CURRENT) USE OF ORAL HYPOGLYCEMIC DRUGS: ICD-10-CM

## 2024-08-25 DIAGNOSIS — E11.40 TYPE 2 DIABETES MELLITUS WITH DIABETIC NEUROPATHY, UNSPECIFIED: ICD-10-CM

## 2024-08-25 DIAGNOSIS — U07.1 COVID-19: ICD-10-CM

## 2024-10-09 ENCOUNTER — EMERGENCY (EMERGENCY)
Facility: HOSPITAL | Age: 89
LOS: 0 days | Discharge: HOME | End: 2024-10-09
Attending: STUDENT IN AN ORGANIZED HEALTH CARE EDUCATION/TRAINING PROGRAM
Payer: MEDICARE

## 2024-10-09 VITALS
HEIGHT: 61 IN | DIASTOLIC BLOOD PRESSURE: 79 MMHG | HEART RATE: 75 BPM | OXYGEN SATURATION: 94 % | WEIGHT: 134.92 LBS | RESPIRATION RATE: 18 BRPM | TEMPERATURE: 98 F | SYSTOLIC BLOOD PRESSURE: 121 MMHG

## 2024-10-09 VITALS — RESPIRATION RATE: 18 BRPM | OXYGEN SATURATION: 97 %

## 2024-10-09 LAB
ANION GAP SERPL CALC-SCNC: 10 MMOL/L — SIGNIFICANT CHANGE UP (ref 7–14)
BASOPHILS # BLD AUTO: 0.03 K/UL — SIGNIFICANT CHANGE UP (ref 0–0.2)
BASOPHILS NFR BLD AUTO: 0.4 % — SIGNIFICANT CHANGE UP (ref 0–1)
BUN SERPL-MCNC: 27 MG/DL — HIGH (ref 10–20)
CALCIUM SERPL-MCNC: 8.9 MG/DL — SIGNIFICANT CHANGE UP (ref 8.4–10.5)
CHLORIDE SERPL-SCNC: 108 MMOL/L — SIGNIFICANT CHANGE UP (ref 98–110)
CO2 SERPL-SCNC: 24 MMOL/L — SIGNIFICANT CHANGE UP (ref 17–32)
CREAT SERPL-MCNC: 0.9 MG/DL — SIGNIFICANT CHANGE UP (ref 0.7–1.5)
EGFR: 62 ML/MIN/1.73M2 — SIGNIFICANT CHANGE UP
EOSINOPHIL # BLD AUTO: 0.07 K/UL — SIGNIFICANT CHANGE UP (ref 0–0.7)
EOSINOPHIL NFR BLD AUTO: 0.9 % — SIGNIFICANT CHANGE UP (ref 0–8)
GAS PNL BLDV: SIGNIFICANT CHANGE UP
GLUCOSE SERPL-MCNC: 133 MG/DL — HIGH (ref 70–99)
HCT VFR BLD CALC: 37.4 % — SIGNIFICANT CHANGE UP (ref 37–47)
HGB BLD-MCNC: 11.9 G/DL — LOW (ref 12–16)
IMM GRANULOCYTES NFR BLD AUTO: 0.3 % — SIGNIFICANT CHANGE UP (ref 0.1–0.3)
LYMPHOCYTES # BLD AUTO: 2.07 K/UL — SIGNIFICANT CHANGE UP (ref 1.2–3.4)
LYMPHOCYTES # BLD AUTO: 26 % — SIGNIFICANT CHANGE UP (ref 20.5–51.1)
MCHC RBC-ENTMCNC: 27.4 PG — SIGNIFICANT CHANGE UP (ref 27–31)
MCHC RBC-ENTMCNC: 31.8 G/DL — LOW (ref 32–37)
MCV RBC AUTO: 86.2 FL — SIGNIFICANT CHANGE UP (ref 81–99)
MONOCYTES # BLD AUTO: 0.67 K/UL — HIGH (ref 0.1–0.6)
MONOCYTES NFR BLD AUTO: 8.4 % — SIGNIFICANT CHANGE UP (ref 1.7–9.3)
NEUTROPHILS # BLD AUTO: 5.11 K/UL — SIGNIFICANT CHANGE UP (ref 1.4–6.5)
NEUTROPHILS NFR BLD AUTO: 64 % — SIGNIFICANT CHANGE UP (ref 42.2–75.2)
NRBC # BLD: 0 /100 WBCS — SIGNIFICANT CHANGE UP (ref 0–0)
PLATELET # BLD AUTO: 304 K/UL — SIGNIFICANT CHANGE UP (ref 130–400)
PMV BLD: 10.9 FL — HIGH (ref 7.4–10.4)
POTASSIUM SERPL-MCNC: SIGNIFICANT CHANGE UP MMOL/L (ref 3.5–5)
POTASSIUM SERPL-SCNC: SIGNIFICANT CHANGE UP MMOL/L (ref 3.5–5)
RBC # BLD: 4.34 M/UL — SIGNIFICANT CHANGE UP (ref 4.2–5.4)
RBC # FLD: 17 % — HIGH (ref 11.5–14.5)
SODIUM SERPL-SCNC: 142 MMOL/L — SIGNIFICANT CHANGE UP (ref 135–146)
WBC # BLD: 7.97 K/UL — SIGNIFICANT CHANGE UP (ref 4.8–10.8)
WBC # FLD AUTO: 7.97 K/UL — SIGNIFICANT CHANGE UP (ref 4.8–10.8)

## 2024-10-09 PROCEDURE — 82330 ASSAY OF CALCIUM: CPT

## 2024-10-09 PROCEDURE — 80048 BASIC METABOLIC PNL TOTAL CA: CPT

## 2024-10-09 PROCEDURE — 36000 PLACE NEEDLE IN VEIN: CPT

## 2024-10-09 PROCEDURE — 73700 CT LOWER EXTREMITY W/O DYE: CPT | Mod: 26,LT,MC

## 2024-10-09 PROCEDURE — 83605 ASSAY OF LACTIC ACID: CPT

## 2024-10-09 PROCEDURE — 70450 CT HEAD/BRAIN W/O DYE: CPT | Mod: 26,MC

## 2024-10-09 PROCEDURE — 99284 EMERGENCY DEPT VISIT MOD MDM: CPT

## 2024-10-09 PROCEDURE — 85025 COMPLETE CBC W/AUTO DIFF WBC: CPT

## 2024-10-09 PROCEDURE — 73700 CT LOWER EXTREMITY W/O DYE: CPT | Mod: MC,LT

## 2024-10-09 PROCEDURE — 99284 EMERGENCY DEPT VISIT MOD MDM: CPT | Mod: 25

## 2024-10-09 PROCEDURE — 84132 ASSAY OF SERUM POTASSIUM: CPT

## 2024-10-09 PROCEDURE — 70450 CT HEAD/BRAIN W/O DYE: CPT | Mod: MC

## 2024-10-09 PROCEDURE — 36415 COLL VENOUS BLD VENIPUNCTURE: CPT

## 2024-10-09 PROCEDURE — 85014 HEMATOCRIT: CPT

## 2024-10-09 PROCEDURE — 84295 ASSAY OF SERUM SODIUM: CPT

## 2024-10-09 PROCEDURE — 82803 BLOOD GASES ANY COMBINATION: CPT

## 2024-10-09 PROCEDURE — 85018 HEMOGLOBIN: CPT

## 2024-10-09 NOTE — ED PROVIDER NOTE - CLINICAL SUMMARY MEDICAL DECISION MAKING FREE TEXT BOX
89-year-old female minimally verbal dementia CVA A-fib on Eliquis diabetes hypertension from [facility], daughter at bedside, was sent from nursing home for possible hip fracture.  No fall.  Patient has been complaining of months of left hip pain and they obtained x-ray that was concerning for possible fracture.  Daughter also requesting CT head because sometimes patient hit her head and she thinks maybe she is in pain.  No fever or vomiting.  Acting at baseline.    On exam, AFVSS, Well appearing, No acute distress, NCAT, EOMI, PERRLA, MMM, Neck supple, LCTAB, RRR nl s1s2 No mrg, Abdomen Soft NTND, alert follows some commands says some simple words no Focal Deficits, No LE edema or calf TTP, left upper extremity contracture, left lower extremity foot drop, pelvis hip stable nontender    A/P; possible hip fracture CT scan with no fracture, just osteopenia, patient at baseline CT head negative, DC home back to nursing home with transport, follow-up with PMD as outpatient strict return precautions

## 2024-10-09 NOTE — ED ADULT TRIAGE NOTE - ESI TRIAGE ACUITY LEVEL, MLM
Edema is limited to her left leg. There is no erythema and it is not tender. 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 Denies HA or EP. Continues to have vomiting after most meals. Reviewed normal pregnancy changes and s/s labor. Questions answered. RTC 1 week   3

## 2024-10-09 NOTE — ED PROVIDER NOTE - NSFOLLOWUPINSTRUCTIONS_ED_ALL_ED_FT
Hip Pain    The hip is the joint between the upper legs and the lower pelvis. The bones, cartilage, tendons, and muscles of your hip joint support your body and allow you to move around.    Hip pain can range from a minor ache to severe pain in one or both of your hips. The pain may be felt on the inside of the hip joint near the groin, or the outside near the buttocks and upper thigh. You may also have swelling or stiffness.    Follow these instructions at home:  Managing pain, stiffness, and swelling     If directed, apply ice to the injured area.  Put ice in a plastic bag.  Place a towel between your skin and the bag.  Leave the ice on for 20 minutes, 2–3 times a day  Sleep with a pillow between your legs on your most comfortable side.  Avoid any activities that cause pain.  General instructions     Take over-the-counter and prescription medicines only as told by your health care provider.  Do any exercises as told by your health care provider.  Record the following:  How often you have hip pain.  The location of your pain.  What the pain feels like.  What makes the pain worse.  Keep all follow-up visits as told by your health care provider. This is important.  Contact a health care provider if:  You cannot put weight on your leg.  Your pain or swelling continues or gets worse after one week.  It gets harder to walk.  You have a fever.  Get help right away if:  You fall.  You have a sudden increase in pain and swelling in your hip.  Your hip is red or swollen or very tender to touch.  Summary  Hip pain can range from a minor ache to severe pain in one or both of your hips.  The pain may be felt on the inside of the hip joint near the groin, or the outside near the buttocks and upper thigh.  Avoid any activities that cause pain.  Record how often you have hip pain, the location of the pain, what makes it worse and what it feels like.  This information is not intended to replace advice given to you by your health care provider. Make sure you discuss any questions you have with your health care provider.    Document Released: 06/07/2011 Document Revised: 11/20/2017 Document Reviewed: 11/20/2017  "[employer] Interactive Patient Education © 2019 "[employer] Inc.

## 2024-10-09 NOTE — ED PROVIDER NOTE - PHYSICAL EXAMINATION
CONSTITUTIONAL: Well-appearing; well-nourished; in no apparent distress.   NECK: Supple; non-tender; no cervical lymphadenopathy.   CARDIOVASCULAR: Normal S1, S2; no murmurs, rubs, or gallops.   RESPIRATORY: Normal chest excursion with respiration; breath sounds clear and equal bilaterally; no wheezes, rhonchi, or rales.  GI/: Non-distended; non-tender; no palpable organomegaly.   MS: left upper extremity contracture, left lower extremity foot drop, pelvis hip stable nontender; No CVA tenderness. Normal ROM in all four extremities; non-tender to palpation; distal pulses are normal.   SKIN: Normal for age and race; warm; dry; good turgor; no apparent lesions or exudate.   NEURO/PSYCH: baseline per daughter; mood and manner are appropriate.

## 2024-10-09 NOTE — ED PROVIDER NOTE - PATIENT PORTAL LINK FT
You can access the FollowMyHealth Patient Portal offered by [facility] by registering at the following website: [email or web address]. By joining [facility]’s FollowMyHealth portal, you will also be able to view your health information using other applications (apps) compatible with our system.

## 2024-10-09 NOTE — ED ADULT NURSE NOTE - OBJECTIVE STATEMENT
BIBEMS from [facility] for left hip and left knee pain, xray at nursing home shows possible L hip fracture. Nursing home denies that patient fell. Hx of dementia.

## 2024-10-09 NOTE — ED ADULT NURSE NOTE - CHIEF COMPLAINT QUOTE
BIBEMS from [facility] for left hip and left knee pain, xray at nursing home shows possible L hip fracture. Nursing home denies that patient fell. Hx of dementia.

## 2024-10-09 NOTE — ED PROVIDER NOTE - ATTENDING APP SHARED VISIT CONTRIBUTION OF CARE
89-year-old female minimally verbal dementia CVA A-fib on Eliquis diabetes hypertension from [facility], daughter at bedside, was sent from nursing home for possible hip fracture.  No fall.  Patient has been complaining of months of left hip pain and they obtained x-ray that was concerning for possible fracture.  Daughter also requesting CT head because sometimes patient hit her head and she thinks maybe she is in pain.  No fever or vomiting.  Acting at baseline.    On exam, AFVSS, Well appearing, No acute distress, NCAT, EOMI, PERRLA, MMM, Neck supple, LCTAB, RRR nl s1s2 No mrg, Abdomen Soft NTND, alert follows some commands says some simple words no Focal Deficits, No LE edema or calf TTP, left upper extremity contracture, left lower extremity foot drop, pelvis hip stable nontender    A/P; possible hip fracture CT scan with no fracture, just osteopenia, patient at baseline CT head negative, DC home back to nursing home with transport, follow-up with PMD as outpatient strict return precautions

## 2024-10-09 NOTE — ED ADULT NURSE NOTE - NSFALLHARMRISKINTERV_ED_ALL_ED
Assistance OOB with selected safe patient handling equipment if applicable/Assistance with ambulation/Communicate risk of Fall with Harm to all staff, patient, and family/Monitor gait and stability/Monitor for mental status changes and reorient to person, place, and time, as needed/Move patient closer to nursing station/within visual sight of ED staff/Provide patient with walking aids/Provide visual cue: red socks, yellow wristband, yellow gown, etc/Reinforce activity limits and safety measures with patient and family/Toileting schedule using arm’s reach rule for commode and bathroom/Use of alarms - bed, stretcher, chair and/or video monitoring/Bed in lowest position, wheels locked, appropriate side rails in place/Call bell, personal items and telephone in reach/Instruct patient to call for assistance before getting out of bed/chair/stretcher/Non-slip footwear applied when patient is off stretcher/[place] to call system/Physically safe environment - no spills, clutter or unnecessary equipment/Purposeful Proactive Rounding/Room/bathroom lighting operational, light cord in reach

## 2024-10-09 NOTE — ED PROVIDER NOTE - OBJECTIVE STATEMENT
pt presents to ED hx obtained from daughter 2/2 pts baseline dementia. h/o CVA, A-fib on Eliquis, DM, HTN from [facility] for concern of L hip fx. has been c/o pain to hip for months and had xray that showed ?fx. pts daughter also sts she hits her head a lot and concerned about that too.

## 2024-10-09 NOTE — ED ADULT TRIAGE NOTE - CHIEF COMPLAINT QUOTE
BIBEMS from [facility] for left hip and left knee pain, xray at nursing home shows possible L hip fracture. Nursing home denies that patient fell. Hx of dementia.

## 2024-11-26 ENCOUNTER — NON-APPOINTMENT (OUTPATIENT)
Age: 89
End: 2024-11-26

## 2024-11-26 ENCOUNTER — APPOINTMENT (OUTPATIENT)
Dept: OTOLARYNGOLOGY | Facility: CLINIC | Age: 89
End: 2024-11-26
Payer: MEDICAID

## 2024-11-26 DIAGNOSIS — H61.90 DISORDER OF EXTERNAL EAR, UNSPECIFIED, UNSPECIFIED EAR: ICD-10-CM

## 2024-11-26 PROCEDURE — 99203 OFFICE O/P NEW LOW 30 MIN: CPT

## 2025-01-29 NOTE — SWALLOW BEDSIDE ASSESSMENT ADULT - SWALLOW EVAL: CURRENT DIET
puree diet w/ mildly thick liquids
NGT
puree diet w/ mildly thick liquids
puree, mildly thick liquids
mother

## 2025-03-05 NOTE — DISCHARGE NOTE PROVIDER - DISCHARGE SERVICE FOR PATIENT
Problem: Nausea/Vomiting  Goal: Maintains oral/enteral intake with decreased/no reports of nausea/vomiting  Outcome: Monitoring/Evaluating progress  Goal: Current weight maintained or increased  Outcome: Monitoring/Evaluating progress  Goal: Patient/family/caregiver verbalizes understanding of s/s and strategies to control nausea/vomiting  Description: Document education using the patient education activity.   Outcome: Monitoring/Evaluating progress  Goal: # Diminished episodes of nausea and vomiting  Outcome: Monitoring/Evaluating progress      on the discharge service for the patient. I have reviewed and made amendments to the documentation where necessary.
